# Patient Record
Sex: MALE | Race: WHITE | Employment: OTHER | URBAN - METROPOLITAN AREA
[De-identification: names, ages, dates, MRNs, and addresses within clinical notes are randomized per-mention and may not be internally consistent; named-entity substitution may affect disease eponyms.]

---

## 2017-04-10 ENCOUNTER — HOSPITAL ENCOUNTER (OUTPATIENT)
Dept: LAB | Age: 62
Discharge: HOME | End: 2017-04-10
Payer: COMMERCIAL

## 2017-04-10 LAB
ALBUMIN SERPL BCP-MCNC: 4 G/DL (ref 3.4–5)
ALBUMIN/GLOB SERPL: 1.1 {RATIO} (ref 0.8–1.7)
ALP SERPL-CCNC: 65 U/L (ref 45–117)
ALT SERPL-CCNC: 60 U/L (ref 16–61)
ANION GAP BLD CALC-SCNC: 7 MMOL/L (ref 3–18)
AST SERPL W P-5'-P-CCNC: 46 U/L (ref 15–37)
BASOPHILS # BLD AUTO: 0 K/UL (ref 0–0.1)
BASOPHILS # BLD: 0 % (ref 0–2)
BILIRUB DIRECT SERPL-MCNC: 0.3 MG/DL (ref 0–0.2)
BILIRUB SERPL-MCNC: 0.6 MG/DL (ref 0.2–1)
BUN SERPL-MCNC: 10 MG/DL (ref 7–18)
BUN/CREAT SERPL: 11 (ref 12–20)
CALCIUM SERPL-MCNC: 8.8 MG/DL (ref 8.5–10.1)
CHLORIDE SERPL-SCNC: 105 MMOL/L (ref 100–108)
CO2 SERPL-SCNC: 27 MMOL/L (ref 21–32)
CREAT SERPL-MCNC: 0.93 MG/DL (ref 0.6–1.3)
DIFFERENTIAL METHOD BLD: ABNORMAL
EOSINOPHIL # BLD: 0.1 K/UL (ref 0–0.4)
EOSINOPHIL NFR BLD: 1 % (ref 0–5)
ERYTHROCYTE [DISTWIDTH] IN BLOOD BY AUTOMATED COUNT: 12.8 % (ref 11.6–14.5)
GLOBULIN SER CALC-MCNC: 3.6 G/DL (ref 2–4)
GLUCOSE SERPL-MCNC: 90 MG/DL (ref 74–99)
HCT VFR BLD AUTO: 47.9 % (ref 36–48)
HGB BLD-MCNC: 16.3 G/DL (ref 13–16)
LYMPHOCYTES # BLD AUTO: 32 % (ref 21–52)
LYMPHOCYTES # BLD: 2.2 K/UL (ref 0.9–3.6)
MCH RBC QN AUTO: 33.3 PG (ref 24–34)
MCHC RBC AUTO-ENTMCNC: 34 G/DL (ref 31–37)
MCV RBC AUTO: 98 FL (ref 74–97)
MONOCYTES # BLD: 0.9 K/UL (ref 0.05–1.2)
MONOCYTES NFR BLD AUTO: 13 % (ref 3–10)
NEUTS SEG # BLD: 3.8 K/UL (ref 1.8–8)
NEUTS SEG NFR BLD AUTO: 54 % (ref 40–73)
PLATELET # BLD AUTO: 222 K/UL (ref 135–420)
PMV BLD AUTO: 11.2 FL (ref 9.2–11.8)
POTASSIUM SERPL-SCNC: 4.2 MMOL/L (ref 3.5–5.5)
PROT SERPL-MCNC: 7.6 G/DL (ref 6.4–8.2)
RBC # BLD AUTO: 4.89 M/UL (ref 4.7–5.5)
SODIUM SERPL-SCNC: 139 MMOL/L (ref 136–145)
WBC # BLD AUTO: 7 K/UL (ref 4.6–13.2)

## 2017-04-10 PROCEDURE — 36415 COLL VENOUS BLD VENIPUNCTURE: CPT | Performed by: PODIATRIST

## 2017-04-10 PROCEDURE — 80053 COMPREHEN METABOLIC PANEL: CPT | Performed by: PODIATRIST

## 2017-04-10 PROCEDURE — 82248 BILIRUBIN DIRECT: CPT | Performed by: PODIATRIST

## 2017-04-10 PROCEDURE — 85025 COMPLETE CBC W/AUTO DIFF WBC: CPT | Performed by: PODIATRIST

## 2017-05-19 ENCOUNTER — HOSPITAL ENCOUNTER (OUTPATIENT)
Dept: LAB | Age: 62
Discharge: HOME | End: 2017-05-19
Payer: COMMERCIAL

## 2017-05-19 LAB
ALBUMIN SERPL BCP-MCNC: 3.7 G/DL (ref 3.4–5)
ALBUMIN/GLOB SERPL: 0.9 {RATIO} (ref 0.8–1.7)
ALP SERPL-CCNC: 63 U/L (ref 45–117)
ALT SERPL-CCNC: 68 U/L (ref 16–61)
AST SERPL W P-5'-P-CCNC: 48 U/L (ref 15–37)
BILIRUB DIRECT SERPL-MCNC: 0.2 MG/DL (ref 0–0.2)
BILIRUB SERPL-MCNC: 0.6 MG/DL (ref 0.2–1)
GLOBULIN SER CALC-MCNC: 3.9 G/DL (ref 2–4)
PROT SERPL-MCNC: 7.6 G/DL (ref 6.4–8.2)

## 2017-05-19 PROCEDURE — 80076 HEPATIC FUNCTION PANEL: CPT | Performed by: PODIATRIST

## 2017-05-19 PROCEDURE — 36415 COLL VENOUS BLD VENIPUNCTURE: CPT | Performed by: PODIATRIST

## 2017-05-26 ENCOUNTER — HOSPITAL ENCOUNTER (OUTPATIENT)
Dept: LAB | Age: 62
Discharge: HOME | End: 2017-05-26
Payer: COMMERCIAL

## 2017-05-26 ENCOUNTER — OFFICE VISIT (OUTPATIENT)
Dept: FAMILY MEDICINE CLINIC | Age: 62
End: 2017-05-26

## 2017-05-26 VITALS
DIASTOLIC BLOOD PRESSURE: 82 MMHG | WEIGHT: 191 LBS | RESPIRATION RATE: 18 BRPM | HEIGHT: 70 IN | OXYGEN SATURATION: 98 % | SYSTOLIC BLOOD PRESSURE: 128 MMHG | HEART RATE: 97 BPM | TEMPERATURE: 97.9 F | BODY MASS INDEX: 27.35 KG/M2

## 2017-05-26 DIAGNOSIS — R79.89 ELEVATED LFTS: ICD-10-CM

## 2017-05-26 DIAGNOSIS — Z12.5 PROSTATE CANCER SCREENING: ICD-10-CM

## 2017-05-26 DIAGNOSIS — K63.5 POLYP OF COLON, UNSPECIFIED PART OF COLON, UNSPECIFIED TYPE: ICD-10-CM

## 2017-05-26 DIAGNOSIS — E78.5 DYSLIPIDEMIA: ICD-10-CM

## 2017-05-26 DIAGNOSIS — Z11.59 SCREENING FOR VIRAL DISEASE: ICD-10-CM

## 2017-05-26 DIAGNOSIS — Z00.00 ROUTINE MEDICAL EXAM: Primary | ICD-10-CM

## 2017-05-26 DIAGNOSIS — Z12.11 SCREENING FOR COLON CANCER: ICD-10-CM

## 2017-05-26 LAB
ALBUMIN SERPL BCP-MCNC: 3.8 G/DL (ref 3.4–5)
ALBUMIN/GLOB SERPL: 1 {RATIO} (ref 0.8–1.7)
ALP SERPL-CCNC: 62 U/L (ref 45–117)
ALT SERPL-CCNC: 68 U/L (ref 16–61)
AST SERPL W P-5'-P-CCNC: 47 U/L (ref 15–37)
BILIRUB DIRECT SERPL-MCNC: 0.2 MG/DL (ref 0–0.2)
BILIRUB SERPL-MCNC: 0.4 MG/DL (ref 0.2–1)
CHOLEST SERPL-MCNC: 124 MG/DL
GLOBULIN SER CALC-MCNC: 3.7 G/DL (ref 2–4)
HDLC SERPL-MCNC: 72 MG/DL (ref 40–60)
HDLC SERPL: 1.7 {RATIO} (ref 0–5)
HEMOCCULT STL QL: NEGATIVE
LDLC SERPL CALC-MCNC: 43.2 MG/DL (ref 0–100)
LIPID PROFILE,FLP: ABNORMAL
PROT SERPL-MCNC: 7.5 G/DL (ref 6.4–8.2)
TRIGL SERPL-MCNC: 44 MG/DL (ref ?–150)
VALID INTERNAL CONTROL?: YES
VLDLC SERPL CALC-MCNC: 8.8 MG/DL

## 2017-05-26 PROCEDURE — 80076 HEPATIC FUNCTION PANEL: CPT | Performed by: NURSE PRACTITIONER

## 2017-05-26 PROCEDURE — 36415 COLL VENOUS BLD VENIPUNCTURE: CPT | Performed by: NURSE PRACTITIONER

## 2017-05-26 PROCEDURE — 80061 LIPID PANEL: CPT | Performed by: NURSE PRACTITIONER

## 2017-05-26 PROCEDURE — 84153 ASSAY OF PSA TOTAL: CPT | Performed by: NURSE PRACTITIONER

## 2017-05-26 PROCEDURE — 86803 HEPATITIS C AB TEST: CPT | Performed by: NURSE PRACTITIONER

## 2017-05-26 NOTE — PROGRESS NOTES
Subjective:     Kole Nickerson is a 57 y.o. male presenting for annual exam and complete physical.  Treated by podiatry for nail fungus  Recent labs reveal AST 46 (reference range---15-37) will repeat today  Remaining CMP normal, CBC normal indices  See scanned copy  Patient Active Problem List   Diagnosis Code    GERD (gastroesophageal reflux disease) K21.9    Seasonal allergic rhinitis J30.2    Hemorrhoids, internal K64.8    Polyp of colon K63.5    Enlarged prostate N40.0    Transient ischemic attack G45.9    Dyslipidemia E78.5    MCKENNA (obstructive sleep apnea) G47.33     Patient Active Problem List    Diagnosis Date Noted    Transient ischemic attack 02/15/2012    Dyslipidemia 02/15/2012    MCKENNA (obstructive sleep apnea) 02/15/2012    Hemorrhoids, internal 08/17/2010    Polyp of colon 08/17/2010    Enlarged prostate 08/17/2010    GERD (gastroesophageal reflux disease) 06/25/2010    Seasonal allergic rhinitis 06/25/2010     Current Outpatient Prescriptions   Medication Sig Dispense Refill    clopidogrel (PLAVIX) 75 mg tablet TAKE 1 TABLET DAILY 90 Tab 2    simvastatin (ZOCOR) 40 mg tablet Take 1 Tab by mouth nightly. Appointment required before next fill. 90 Tab 0    famotidine (PEPCID) 20 mg tablet Take 1 Tab by mouth two (2) times a day. 60 Tab 3    Triamcinolone Acetonide (NASACORT AQ) 55 mcg/Actuation Aero nasal spray 2 Sprays by Both Nostrils route daily as needed (nasal congestion).  3 Container 3     Allergies   Allergen Reactions    Bactrim [Sulfamethoprim Ds] Rash    Nexium [Esomeprazole Magnesium] Other (comments)     Anxiety     Past Medical History:   Diagnosis Date    Dyslipidemia 2/15/2012    GERD (gastroesophageal reflux disease)     Hiatal hernia     IBS (irritable bowel syndrome)     mild     MCKENNA (obstructive sleep apnea) 2/15/2012    does not use cpap machine, oral device only    Seasonal allergies     Transient ischemic attack 2/15/2012     Past Surgical History: Procedure Laterality Date    HX GI  2000's    Fistulatomy    HX TONSILLECTOMY  30 years ago     No family history on file. Social History   Substance Use Topics    Smoking status: Former Smoker     Packs/day: 1.00     Years: 30.00     Quit date: 11/20/2012    Smokeless tobacco: Former User      Comment: quit smoking in 2012, e-ciggerate (vapor)    Alcohol use Yes      Comment: 12 pack per week             Review of Systems  A comprehensive review of systems was negative.     Objective:     Visit Vitals    /82 (BP 1 Location: Left arm, BP Patient Position: Sitting)    Pulse 97    Temp 97.9 °F (36.6 °C) (Oral)    Resp 18    Ht 5' 10\" (1.778 m)    Wt 191 lb (86.6 kg)    SpO2 98%    BMI 27.41 kg/m2     Physical exam:   General appearance - alert, well appearing, and in no distress, oriented to person, place, and time and normal appearing weight  Mental status - alert, oriented to person, place, and time, normal mood, behavior, speech, dress, motor activity, and thought processes  Eyes - pupils equal and reactive, extraocular eye movements intact  Ears - bilateral TM's and external ear canals normal, hearing grossly normal bilaterally  Nose - normal and patent, no erythema, discharge or polyps  Mouth - mucous membranes moist, pharynx normal without lesions, dental hygiene good and TMJ exam normal, no tenderness, normal excursion  Neck - supple, no significant adenopathy, carotids upstroke normal bilaterally, no bruits, thyroid exam: thyroid is normal in size without nodules or tenderness  Lymphatics - no palpable lymphadenopathy, no hepatosplenomegaly  Chest - clear to auscultation, no wheezes, rales or rhonchi, symmetric air entry  Heart - normal rate, regular rhythm, normal S1, S2, no murmurs, rubs, clicks or gallops, normal bilateral carotid upstroke without bruits, no JVD  Abdomen - soft, nontender, nondistended, no masses or organomegaly  bowel sounds normal   Male - no penile lesions or discharge, no testicular masses or tenderness, no hernias  Rectal - negative without mass, lesions or tenderness, sphincter tone normal, stool guaiac negative, PROSTATE EXAM: smooth and symmetric without nodules or tenderness  Back exam - full range of motion, no tenderness, palpable spasm or pain on motion  Neurological - alert, oriented, normal speech, no focal findings or movement disorder noted, neck supple without rigidity, cranial nerves II through XII intact, funduscopic exam normal, discs flat and sharp, DTR's normal and symmetric, Babinski sign negative, motor and sensory grossly normal bilaterally, normal muscle tone, no tremors, strength 5/5, Romberg sign negative, normal gait and station  Musculoskeletal - no joint tenderness, deformity or swelling  Extremities - peripheral pulses normal, no pedal edema, no clubbing or cyanosis  Skin - normal coloration and turgor, no rashes, no suspicious skin lesions noted  NAILS: onychomycosis of the toenails (managed by podiatry)   Results for orders placed or performed in visit on 05/26/17   AMB POC FECAL BLOOD, OCCULT, QL 1 CARD   Result Value Ref Range    VALID INTERNAL CONTROL POC Yes     Hemoccult (POC) Negative Negative       Assessment/Plan:     Neftali Danielle was seen today for well male. Diagnoses and all orders for this visit:    Routine medical exam    Screening for colon cancer  -     AMB POC FECAL BLOOD, OCCULT, QL 1 CARD  -     REFERRAL TO GASTROENTEROLOGY    Screening for viral disease  -     HEPATITIS C AB; Future    Dyslipidemia  -     LIPID PANEL; Future    Polyp of colon, unspecified part of colon, unspecified type  -     REFERRAL TO GASTROENTEROLOGY    Elevated LFTs  -     HEPATIC FUNCTION PANEL; Future    Prostate cancer screening  -     PSA - PROSTATE SPECIFIC AG; Future    patient declines all immunizations today  Anticipatory guidance regarding health promotion for this age range and patient verbalizes understanding.   Portion control and exercise healthy eating general guidelines reviewed with patient to get closer to normal BMI  Health maintenance reviewed  Patient verbalizes understanding. I have discussed the diagnosis with the patient and the intended plan as seen in the above orders. The patient has received an after-visit summary and questions were answered concerning future plans. I have discussed medication side effects and warnings with the patient as well. Follow-up Disposition:  Return in about 1 year (around 5/26/2018) for wellness.

## 2017-05-26 NOTE — PATIENT INSTRUCTIONS
Well Visit, Men 48 to 72: Care Instructions  Your Care Instructions  Physical exams can help you stay healthy. Your doctor has checked your overall health and may have suggested ways to take good care of yourself. He or she also may have recommended tests. At home, you can help prevent illness with healthy eating, regular exercise, and other steps. Follow-up care is a key part of your treatment and safety. Be sure to make and go to all appointments, and call your doctor if you are having problems. It's also a good idea to know your test results and keep a list of the medicines you take. How can you care for yourself at home? · Reach and stay at a healthy weight. This will lower your risk for many problems, such as obesity, diabetes, heart disease, and high blood pressure. · Get at least 30 minutes of exercise on most days of the week. Walking is a good choice. You also may want to do other activities, such as running, swimming, cycling, or playing tennis or team sports. · Do not smoke. Smoking can make health problems worse. If you need help quitting, talk to your doctor about stop-smoking programs and medicines. These can increase your chances of quitting for good. · Protect your skin from too much sun. When you're outdoors from 10 a.m. to 4 p.m., stay in the shade or cover up with clothing and a hat with a wide brim. Wear sunglasses that block UV rays. Even when it's cloudy, put broad-spectrum sunscreen (SPF 30 or higher) on any exposed skin. · See a dentist one or two times a year for checkups and to have your teeth cleaned. · Wear a seat belt in the car. · Limit alcohol to 2 drinks a day. Too much alcohol can cause health problems. Follow your doctor's advice about when to have certain tests. These tests can spot problems early. · Cholesterol.  Your doctor will tell you how often to have this done based on your overall health and other things that can increase your risk for heart attack and stroke. · Blood pressure. Have your blood pressure checked during a routine doctor visit. Your doctor will tell you how often to check your blood pressure based on your age, your blood pressure results, and other factors. · Prostate exam. Talk to your doctor about whether you should have a blood test (called a PSA test) for prostate cancer. Experts disagree on whether men should have this test. Some experts recommend that you discuss the benefits and risks of the test with your doctor. · Diabetes. Ask your doctor whether you should have tests for diabetes. · Vision. Some experts recommend that you have yearly exams for glaucoma and other age-related eye problems starting at age 48. · Hearing. Tell your doctor if you notice any change in your hearing. You can have tests to find out how well you hear. · Colon cancer. You should begin tests for colon cancer at age 48. You may have one of several tests. Your doctor will tell you how often to have tests based on your age and risk. Risks include whether you already had a precancerous polyp removed from your colon or whether your parent, brother, sister, or child has had colon cancer. · Heart attack and stroke risk. At least every 4 to 6 years, you should have your risk for heart attack and stroke assessed. Your doctor uses factors such as your age, blood pressure, cholesterol, and whether you smoke or have diabetes to show what your risk for a heart attack or stroke is over the next 10 years. · Abdominal aortic aneurysm. Ask your doctor whether you should have a test to check for an aneurysm. You may need a test if you ever smoked or if your parent, brother, sister, or child has had an aneurysm. When should you call for help? Watch closely for changes in your health, and be sure to contact your doctor if you have any problems or symptoms that concern you. Where can you learn more? Go to http://jose d-valorie.info/.   Enter F457 in the search box to learn more about \"Well Visit, Men 48 to 72: Care Instructions. \"  Current as of: 2016  Content Version: 11.2  © 4579-0263 [employer]. Care instructions adapted under license by [employer] (which disclaims liability or warranty for this information). If you have questions about a medical condition or this instruction, always ask your healthcare professional. [address] any warranty or liability for your use of this information. Tdap (Tetanus, Diphtheria, Pertussis) Vaccine: What You Need to Know  Why get vaccinated? Tetanus, diphtheria, and pertussis are very serious diseases. Tdap vaccine can protect us from these diseases. And Tdap vaccine given to pregnant women can protect  babies against pertussis. Tetanus (lockjaw) is rare in the [facility] today. It causes painful muscle tightening and stiffness, usually all over the body. · It can lead to tightening of muscles in the head and neck so you can't open your mouth, swallow, or sometimes even breathe. Tetanus kills about 1 out of 10 people who are infected even after receiving the best medical care. Diphtheria is also rare in the [place] today. It can cause a thick coating to form in the back of the throat. · It can lead to breathing problems, heart failure, paralysis, and death. Pertussis (whooping cough) causes severe coughing spells, which can cause difficulty breathing, vomiting, and disturbed sleep. · It can also lead to weight loss, incontinence, and rib fractures. Up to 2 in 100 adolescents and 5 in 100 adults with pertussis are hospitalized or have complications, which could include pneumonia or death. These diseases are caused by bacteria. Diphtheria and pertussis are spread from person to person through secretions from coughing or sneezing. Tetanus enters the body through cuts, scratches, or wounds.   Before vaccines, as many as 200,000 cases of diphtheria, 200,000 cases of pertussis, and hundreds of cases of tetanus were reported in the [place] each year. Since vaccination began, reports of cases for tetanus and diphtheria have dropped by about 99% and for pertussis by about 80%. Tdap vaccine  The Tdap vaccine can protect adolescents and adults from tetanus, diphtheria, and pertussis. One dose of Tdap is routinely given at age 6 or 15. People who did not get Tdap at that age should get it as soon as possible. Tdap is especially important for health care professionals and anyone having close contact with a baby younger than 12 months. Pregnant women should get a dose of Tdap during every pregnancy, to protect the  from pertussis. Infants are most at risk for severe, life-threatening complications from pertussis. Another vaccine, called Td, protects against tetanus and diphtheria, but not pertussis. A Td booster should be given every 10 years. Tdap may be given as one of these boosters if you have never gotten Tdap before. Tdap may also be given after a severe cut or burn to prevent tetanus infection. Your doctor or the person giving you the vaccine can give you more information. Tdap may safely be given at the same time as other vaccines. Some people should not get this vaccine  · A person who has ever had a life-threatening allergic reaction after a previous dose of any diphtheria-, tetanus-, or pertussis-containing vaccine, OR has a severe allergy to any part of this vaccine, should not get Tdap vaccine. Tell the person giving the vaccine about any severe allergies. · Anyone who had coma or long repeated seizures within 7 days after a childhood dose of DTP or DTaP, or a previous dose of Tdap, should not get Tdap, unless a cause other than the vaccine was found. They can still get Td. · Talk to your doctor if you:  ¨ Have seizures or another nervous system problem.   ¨ Had severe pain or swelling after any vaccine containing diphtheria, tetanus, or pertussis. ¨ Ever had a condition called Guillain-Barré Syndrome (GBS). ¨ Aren't feeling well on the day the shot is scheduled. Risks  With any medicine, including vaccines, there is a chance of side effects. These are usually mild and go away on their own. Serious reactions are also possible but are rare. Most people who get Tdap vaccine do not have any problems with it. Mild problems following Tdap  (Did not interfere with activities)  · Pain where the shot was given (about 3 in 4 adolescents or 2 in 3 adults)  · Redness or swelling where the shot was given (about 1 person in 5)  · Mild fever of at least 100.4°F (up to about 1 in 25 adolescents or 1 in 100 adults)  · Headache (about 3 or 4 people in 10)  · Tiredness (about 1 person in 3 or 4)  · Nausea, vomiting, diarrhea, stomachache (up to 1 in 4 adolescents or 1 in 10 adults)  · Chills, sore joints (about 1 person in 10)  · Body aches (about 1 person in 3 or 4)  · Rash, swollen glands (uncommon)  Moderate problems following Tdap  (Interfered with activities, but did not require medical attention)  · Pain where the shot was given (up to 1 in 5 or 6)  · Redness or swelling where the shot was given (up to about 1 in 16 adolescents or 1 in 12 adults)  · Fever over 102°F (about 1 in 100 adolescents or 1 in 250 adults)  · Headache (about 1 in 7 adolescents or 1 in 10 adults)  · Nausea, vomiting, diarrhea, stomachache (up to 1 to 3 people in 100)  · Swelling of the entire arm where the shot was given (up to about 1 in 500)  Severe problems following Tdap  (Unable to perform usual activities; required medical attention)  · Swelling, severe pain, bleeding and redness in the arm where the shot was given (rare)  Problems that could happen after any vaccine:  · People sometimes faint after a medical procedure, including vaccination. Sitting or lying down for about 15 minutes can help prevent fainting, and injuries caused by a fall.  Tell your doctor if you feel dizzy or have vision changes or ringing in the ears. · Some people get severe pain in the shoulder and have difficulty moving the arm where a shot was given. This happens very rarely. · Any medication can cause a severe allergic reaction. Such reactions from a vaccine are very rare, estimated at fewer than 1 in a million doses, and would happen within a few minutes to a few hours after the vaccination. As with any medicine, there is a very remote chance of a vaccine causing a serious injury or death. The safety of vaccines is always being monitored. For more information, visit: www.cdc.gov/vaccinesafety. What if there is a serious problem? What should I look for? · Look for anything that concerns you, such as signs of a severe allergic reaction, very high fever, or unusual behavior. Signs of a severe allergic reaction can include hives, swelling of the face and throat, difficulty breathing, a fast heartbeat, dizziness, and weakness. These would usually start a few minutes to a few hours after the vaccination. What should I do? · If you think it is a severe allergic reaction or other emergency that can't wait, call 9-1-1 or get the person to the nearest hospital. Otherwise, call your doctor. · Afterward, the reaction should be reported to the Vaccine Adverse Event Reporting System (VAERS). Your doctor might file this report, or you can do it yourself through the VAERS web site at www.vaers. hhs.gov, or by calling 1-624.656.4384. VAERS does not give medical advice. The National Vaccine Injury Compensation Program  The National Vaccine Injury Compensation Program (VICP) is a federal program that was created to compensate people who may have been injured by certain vaccines. Persons who believe they may have been injured by a vaccine can learn about the program and about filing a claim by calling 4-608.534.4901 or visiting the [employer] website at www.[facility].gov/vaccinecompensation.  There is a time limit to file a claim for compensation. How can I learn more? · Ask your doctor. He or she can give you the vaccine package insert or suggest other sources of information. · Call your local or state health department. · Contact the Centers for Disease Control and Prevention (CDC):  ¨ Call 0-875.658.3070 (1-800-CDC-INFO) or  ¨ Visit CDC's website at www.cdc.gov/vaccines  Vaccine Information Statement (Interim)  Tdap Vaccine  (2/24/15)  [address] [id number]  Department of Health and Human Services  Centers for Disease Control and Prevention  Many Vaccine Information Statements are available in Thai and other languages. See www.immunize.org/vis. Muchas hojas de información sobre vacunas están disponibles en español y en otros idiomas. Visite www.immunize.org/vis. Care instructions adapted under license by your healthcare professional. If you have questions about a medical condition or this instruction, always ask your healthcare professional. [place] [address] any warranty or liability for your use of this information. Shingles Vaccine: What You Need to Know  What is shingles? Shingles is a painful skin rash, often with blisters. It is also called herpes zoster, or just zoster. A shingles rash usually appears on one side of the face or body and lasts from 2 to 4 weeks. Its main symptom is pain, which can be quite severe. Other symptoms of shingles can include fever, headache, chills and upset stomach. Very rarely, a shingles infection can lead to pneumonia, hearing problems, blindness, brain inflammation (encephalitis) or death. For about 1 person in 5, severe pain can continue even long after the rash clears up. This is called post-herpetic neuralgia. Shingles is caused by the varicella zoster virus, the same virus that causes chickenpox. Only someone who has had chickenpoxor, rarely, has gotten chickenpox vaccinecan get shingles.  The virus stays in your body, and can cause shingles many years later.  You can't catch shingles from another person with shingles. However, a person who has never had chickenpox (or chickenpox vaccine) could get chickenpox from someone with shingles. This is not very common. Shingles is far more common in people 48years of age and older than in younger people. It is also more common in people whose immune systems are weakened because of a disease such as cancer, or drugs such as steroids or chemotherapy. At least 1 million people a year in the [facility] get shingles. Shingles vaccine  A vaccine for shingles was licensed in 3708. In clinical trials, the vaccine reduced the risk of shingles by 50%. It can also reduce pain in people who still get shingles after being vaccinated. A single dose of shingles vaccine is recommended for adults 61years of age and older. Some people should not get shingles vaccine or should wait  A person should not get shingles vaccine who:  · Has ever had a life-threatening allergic reaction to gelatin, the antibiotic neomycin, or any other component of shingles vaccine. Tell your doctor if you have any severe allergies. · Has a weakened immune system because of current:  ¨ AIDS or another disease that affects the immune system. ¨ Treatment with drugs that affect the immune system, such as prolonged use of high-dose steroids. ¨ Cancer treatment such as radiation or chemotherapy. ¨ Cancer affecting the bone marrow or lymphatic system, such as leukemia or lymphoma. · Is pregnant, or might be pregnant. Women should not become pregnant until at least 4 weeks after getting shingles vaccine. Someone with a minor acute illness, such as a cold, may be vaccinated. But anyone with a moderate or severe acute illness should usually wait until they recover before getting the vaccine. This includes anyone with a temperature of 101.3° F or higher. What are the risks from shingles vaccine?   A vaccine, like any medicine, could possibly cause serious problems, such as severe allergic reactions. However, the risk of a vaccine causing serious harm, or death, is extremely small. No serious problems have been identified with shingles vaccine. Mild problems  · Redness, soreness, swelling, or itching at the site of the injection (about 1 person in 3)  · Headache (about 1 person in 70)  Like all vaccines, shingles vaccine is being closely monitored for unusual or severe problems. What if there is a serious reaction? What should I look for? · Look for anything that concerns you, such as signs of a severe allergic reaction, very high fever, or behavior changes. Signs of a severe allergic reaction can include hives, swelling of the face and throat, difficulty breathing, a fast heartbeat, dizziness, and weakness. These would start a few minutes to a few hours after the vaccination. What should I do? · If you think it is a severe allergic reaction or other emergency that can't wait, call 9-1-1 or get the person to the nearest hospital. Otherwise, call your doctor. · Afterward, the reaction should be reported to the Vaccine Adverse Event Reporting System (VAERS). Your doctor might file this report, or you can do it yourself through the VAERS web site at www.vaers. hhs.gov, or by calling 0-981.336.6498. VAERS is only for reporting reactions. They do not give medical advice. How can I learn more? · Ask your doctor. · Call your local or state health department. · Contact the Centers for Disease Control and Prevention (CDC):  ¨ Call 6-705.561.7617 (1-800-CDC-INFO) or  ¨ Visit CDC's website at www.cdc.gov/vaccines  Vaccine Information Statement  Shingles Vaccine  (10/6/2009)  Department of Health and Human Services  Centers for Disease Control and Prevention  Many Vaccine Information Statements are available in [place] and other languages. See www.immunize.org/vis. Muchas hojas de información sobre vacunas están disponibles en español y en otros idiomas.  Visite www.immunize.org/vis. Care instructions adapted under license by your healthcare professional. If you have questions about a medical condition or this instruction, always ask your healthcare professional. [address] any warranty or liability for your use of this information. Body Mass Index: Care Instructions  Your Care Instructions    Body mass index (BMI) can help you see if your weight is raising your risk for health problems. It uses a formula to compare how much you weigh with how tall you are. · A BMI lower than 18.5 is considered underweight. · A BMI between 18.5 and 24.9 is considered healthy. · A BMI between 25 and 29.9 is considered overweight. A BMI of 30 or higher is considered obese. If your BMI is in the normal range, it means that you have a lower risk for weight-related health problems. If your BMI is in the overweight or obese range, you may be at increased risk for weight-related health problems, such as high blood pressure, heart disease, stroke, arthritis or joint pain, and diabetes. If your BMI is in the underweight range, you may be at increased risk for health problems such as fatigue, lower protection (immunity) against illness, muscle loss, bone loss, hair loss, and hormone problems. BMI is just one measure of your risk for weight-related health problems. You may be at higher risk for health problems if you are not active, you eat an unhealthy diet, or you drink too much alcohol or use tobacco products. Follow-up care is a key part of your treatment and safety. Be sure to make and go to all appointments, and call your doctor if you are having problems. It's also a good idea to know your test results and keep a list of the medicines you take. How can you care for yourself at home? · Practice healthy eating habits. This includes eating plenty of fruits, vegetables, whole grains, lean protein, and low-fat dairy.   · If your doctor recommends it, get more exercise. Walking is a good choice. Bit by bit, increase the amount you walk every day. Try for at least 30 minutes on most days of the week. · Do not smoke. Smoking can increase your risk for health problems. If you need help quitting, talk to your doctor about stop-smoking programs and medicines. These can increase your chances of quitting for good. · Limit alcohol to 2 drinks a day for men and 1 drink a day for women. Too much alcohol can cause health problems. If you have a BMI higher than 25  · Your doctor may do other tests to check your risk for weight-related health problems. This may include measuring the distance around your waist. A waist measurement of more than 40 inches in men or 35 inches in women can increase the risk of weight-related health problems. · Talk with your doctor about steps you can take to stay healthy or improve your health. You may need to make lifestyle changes to lose weight and stay healthy, such as changing your diet and getting regular exercise. If you have a BMI lower than 18.5  · Your doctor may do other tests to check your risk for health problems. · Talk with your doctor about steps you can take to stay healthy or improve your health. You may need to make lifestyle changes to gain or maintain weight and stay healthy, such as getting more healthy foods in your diet and doing exercises to build muscle. Where can you learn more? Go to http://jose d-valorie.info/. Enter S176 in the search box to learn more about \"Body Mass Index: Care Instructions. \"  Current as of: January 23, 2017  Content Version: 11.2  © 6481-8591 [employer]. Care instructions adapted under license by [employer] (which disclaims liability or warranty for this information).  If you have questions about a medical condition or this instruction, always ask your healthcare professional. [place] [address] any warranty or liability for your use of this information.

## 2017-05-26 NOTE — MR AVS SNAPSHOT
Visit Information Date & Time Provider Department Dept. Phone Encounter #  
 5/26/2017 12:00 PM Mk Hart NP [facility] [phone] [id number] Follow-up Instructions Return in about 1 year (around 5/26/2018) for wellness. Upcoming Health Maintenance Date Due FOBT Q 1 YEAR AGE 50-75 11/23/2004 ZOSTER VACCINE AGE 60> 11/23/2014 INFLUENZA AGE 9 TO ADULT 8/1/2017 DTaP/Tdap/Td series (2 - Td) 5/26/2027 Allergies as of 5/26/2017  Review Complete On: 5/26/2017 By: Mk Hart NP Severity Noted Reaction Type Reactions Bactrim [Sulfamethoprim Ds]  09/16/2013    Rash Nexium [Esomeprazole Magnesium]  06/25/2010    Other (comments) Anxiety Current Immunizations  Reviewed on 5/26/2017 Name Date Influenza Vaccine (Quad) PF 11/2/2016, 10/21/2015, 9/26/2014 Influenza Vaccine PF 9/16/2013, 12/4/2012 11:11 AM  
 Influenza Vaccine Split 9/29/2011 Reviewed by Mk Hart NP on 5/26/2017 at 12:15 PM  
You Were Diagnosed With   
  
 Codes Comments Routine medical exam    -  Primary ICD-10-CM: Z00.00 ICD-9-CM: V70.0 Screening for colon cancer     ICD-10-CM: Z12.11 ICD-9-CM: V76.51 Screening for viral disease     ICD-10-CM: Z11.59 
ICD-9-CM: V73.99 Dyslipidemia     ICD-10-CM: E78.5 ICD-9-CM: 272.4 Polyp of colon, unspecified part of colon, unspecified type     ICD-10-CM: K63.5 ICD-9-CM: 211.3 Elevated LFTs     ICD-10-CM: R94.5 ICD-9-CM: 790.6 Prostate cancer screening     ICD-10-CM: Z12.5 ICD-9-CM: V76.44 Vitals BP Pulse Temp Resp Height(growth percentile) Weight(growth percentile) 128/82 (BP 1 Location: Left arm, BP Patient Position: Sitting) 97 97.9 °F (36.6 °C) (Oral) 18 5' 10\" (1.778 m) 191 lb (86.6 kg) SpO2 BMI Smoking Status 98% 27.41 kg/m2 Former Smoker Vitals History BMI and BSA Data Body Mass Index Body Surface Area 27.41 kg/m 2 2.07 m 2 Preferred Pharmacy Pharmacy Name Phone [address] [facility] [phone] Your Updated Medication List  
  
   
This list is accurate as of: 5/26/17 12:36 PM.  Always use your most recent med list.  
  
  
  
  
 clopidogrel 75 mg Tab Commonly known as:  PLAVIX TAKE 1 TABLET DAILY  
  
 famotidine 20 mg tablet Commonly known as:  PEPCID Take 1 Tab by mouth two (2) times a day. simvastatin 40 mg tablet Commonly known as:  ZOCOR Take 1 Tab by mouth nightly. Appointment required before next fill. Triamcinolone Acetonide 55 mcg/actuation Aero nasal spray Commonly known as:  NASACORT AQ  
2 Sprays by Both Nostrils route daily as needed (nasal congestion). We Performed the Following AMB POC FECAL BLOOD, OCCULT, QL 1 CARD [29507 CPT(R)] REFERRAL TO GASTROENTEROLOGY [SNI80 Custom] Comments:  
 Please evaluate patient for colon polyps. Follow-up Instructions Return in about 1 year (around 5/26/2018) for wellness. To-Do List   
 05/26/2017 Lab:  HEPATIC FUNCTION PANEL   
  
 05/26/2017 Lab:  HEPATITIS C AB   
  
 05/26/2017 Lab:  LIPID PANEL   
  
 05/26/2017 Lab:  PROSTATE SPECIFIC AG (PSA)   
  
 11/22/2017 8:30 AM  
  Appointment with [facility]  RM 1 at [address] [place] [address] ([phone]) OUTSIDE FILMS  - Any outside films related to the study being scheduled should be brought with you on the day of the exam.  If this cannot be done there may be a delay in the reading of the study. MEDICATIONS  - Patient must bring a complete list of all medications currently taking to include prescriptions, over-the-counter meds, herbals, vitamins & any dietary supplements Referral Information Referral ID Referred By Referred To  
  
 [id number] [place] [address] [place], [address] Phone: [phone] Fax: [phone] Visits Status Start Date End Date 1 New Request 5/26/17 5/26/18 If your referral has a status of pending review or denied, additional information will be sent to support the outcome of this decision. Patient Instructions Well Visit, Men 48 to 72: Care Instructions Your Care Instructions Physical exams can help you stay healthy. Your doctor has checked your overall health and may have suggested ways to take good care of yourself. He or she also may have recommended tests. At home, you can help prevent illness with healthy eating, regular exercise, and other steps. Follow-up care is a key part of your treatment and safety. Be sure to make and go to all appointments, and call your doctor if you are having problems. It's also a good idea to know your test results and keep a list of the medicines you take. How can you care for yourself at home? · Reach and stay at a healthy weight. This will lower your risk for many problems, such as obesity, diabetes, heart disease, and high blood pressure. · Get at least 30 minutes of exercise on most days of the week. Walking is a good choice. You also may want to do other activities, such as running, swimming, cycling, or playing tennis or team sports. · Do not smoke. Smoking can make health problems worse. If you need help quitting, talk to your doctor about stop-smoking programs and medicines. These can increase your chances of quitting for good. · Protect your skin from too much sun. When you're outdoors from 10 a.m. to 4 p.m., stay in the shade or cover up with clothing and a hat with a wide brim. Wear sunglasses that block UV rays. Even when it's cloudy, put broad-spectrum sunscreen (SPF 30 or higher) on any exposed skin. · See a dentist one or two times a year for checkups and to have your teeth cleaned. · Wear a seat belt in the car. · Limit alcohol to 2 drinks a day. Too much alcohol can cause health problems. Follow your doctor's advice about when to have certain tests. These tests can spot problems early. · Cholesterol. Your doctor will tell you how often to have this done based on your overall health and other things that can increase your risk for heart attack and stroke. · Blood pressure. Have your blood pressure checked during a routine doctor visit. Your doctor will tell you how often to check your blood pressure based on your age, your blood pressure results, and other factors. · Prostate exam. Talk to your doctor about whether you should have a blood test (called a PSA test) for prostate cancer. Experts disagree on whether men should have this test. Some experts recommend that you discuss the benefits and risks of the test with your doctor. · Diabetes. Ask your doctor whether you should have tests for diabetes. · Vision. Some experts recommend that you have yearly exams for glaucoma and other age-related eye problems starting at age 48. · Hearing. Tell your doctor if you notice any change in your hearing. You can have tests to find out how well you hear. · Colon cancer. You should begin tests for colon cancer at age 48. You may have one of several tests. Your doctor will tell you how often to have tests based on your age and risk. Risks include whether you already had a precancerous polyp removed from your colon or whether your parent, brother, sister, or child has had colon cancer. · Heart attack and stroke risk. At least every 4 to 6 years, you should have your risk for heart attack and stroke assessed. Your doctor uses factors such as your age, blood pressure, cholesterol, and whether you smoke or have diabetes to show what your risk for a heart attack or stroke is over the next 10 years. · Abdominal aortic aneurysm.  Ask your doctor whether you should have a test to check for an aneurysm. You may need a test if you ever smoked or if your parent, brother, sister, or child has had an aneurysm. When should you call for help? Watch closely for changes in your health, and be sure to contact your doctor if you have any problems or symptoms that concern you. Where can you learn more? Go to http://jose d-valorie.info/. Enter V046 in the search box to learn more about \"Well Visit, Men 48 to 72: Care Instructions. \" Current as of: 2016 Content Version: 11.2 © 0358-9666 [employer]. Care instructions adapted under license by [employer] (which disclaims liability or warranty for this information). If you have questions about a medical condition or this instruction, always ask your healthcare professional. [address] any warranty or liability for your use of this information. Tdap (Tetanus, Diphtheria, Pertussis) Vaccine: What You Need to Know Why get vaccinated? Tetanus, diphtheria, and pertussis are very serious diseases. Tdap vaccine can protect us from these diseases. And Tdap vaccine given to pregnant women can protect  babies against pertussis. Tetanus (lockjaw) is rare in the [facility] today. It causes painful muscle tightening and stiffness, usually all over the body. · It can lead to tightening of muscles in the head and neck so you can't open your mouth, swallow, or sometimes even breathe. Tetanus kills about 1 out of 10 people who are infected even after receiving the best medical care. Diphtheria is also rare in the [place] today. It can cause a thick coating to form in the back of the throat. · It can lead to breathing problems, heart failure, paralysis, and death. Pertussis (whooping cough) causes severe coughing spells, which can cause difficulty breathing, vomiting, and disturbed sleep. · It can also lead to weight loss, incontinence, and rib fractures. Up to 2 in 100 adolescents and 5 in 100 adults with pertussis are hospitalized or have complications, which could include pneumonia or death. These diseases are caused by bacteria. Diphtheria and pertussis are spread from person to person through secretions from coughing or sneezing. Tetanus enters the body through cuts, scratches, or wounds. Before vaccines, as many as 200,000 cases of diphtheria, 200,000 cases of pertussis, and hundreds of cases of tetanus were reported in the [place] each year. Since vaccination began, reports of cases for tetanus and diphtheria have dropped by about 99% and for pertussis by about 80%. Tdap vaccine The Tdap vaccine can protect adolescents and adults from tetanus, diphtheria, and pertussis. One dose of Tdap is routinely given at age 6 or 15. People who did not get Tdap at that age should get it as soon as possible. Tdap is especially important for health care professionals and anyone having close contact with a baby younger than 12 months. Pregnant women should get a dose of Tdap during every pregnancy, to protect the  from pertussis. Infants are most at risk for severe, life-threatening complications from pertussis. Another vaccine, called Td, protects against tetanus and diphtheria, but not pertussis. A Td booster should be given every 10 years. Tdap may be given as one of these boosters if you have never gotten Tdap before. Tdap may also be given after a severe cut or burn to prevent tetanus infection. Your doctor or the person giving you the vaccine can give you more information. Tdap may safely be given at the same time as other vaccines. Some people should not get this vaccine · A person who has ever had a life-threatening allergic reaction after a previous dose of any diphtheria-, tetanus-, or pertussis-containing vaccine, OR has a severe allergy to any part of this vaccine, should not get Tdap vaccine. Tell the person giving the vaccine about any severe allergies. · Anyone who had coma or long repeated seizures within 7 days after a childhood dose of DTP or DTaP, or a previous dose of Tdap, should not get Tdap, unless a cause other than the vaccine was found. They can still get Td. · Talk to your doctor if you: 
¨ Have seizures or another nervous system problem. ¨ Had severe pain or swelling after any vaccine containing diphtheria, tetanus, or pertussis. ¨ Ever had a condition called Guillain-Barré Syndrome (GBS). ¨ Aren't feeling well on the day the shot is scheduled. Risks With any medicine, including vaccines, there is a chance of side effects. These are usually mild and go away on their own. Serious reactions are also possible but are rare. Most people who get Tdap vaccine do not have any problems with it. Mild problems following Tdap 
(Did not interfere with activities) · Pain where the shot was given (about 3 in 4 adolescents or 2 in 3 adults) · Redness or swelling where the shot was given (about 1 person in 5) · Mild fever of at least 100.4°F (up to about 1 in 25 adolescents or 1 in 100 adults) · Headache (about 3 or 4 people in 10) · Tiredness (about 1 person in 3 or 4) · Nausea, vomiting, diarrhea, stomachache (up to 1 in 4 adolescents or 1 in 10 adults) · Chills, sore joints (about 1 person in 10) · Body aches (about 1 person in 3 or 4) · Rash, swollen glands (uncommon) Moderate problems following Tdap (Interfered with activities, but did not require medical attention) · Pain where the shot was given (up to 1 in 5 or 6) · Redness or swelling where the shot was given (up to about 1 in 16 adolescents or 1 in 12 adults) · Fever over 102°F (about 1 in 100 adolescents or 1 in 250 adults) · Headache (about 1 in 7 adolescents or 1 in 10 adults) · Nausea, vomiting, diarrhea, stomachache (up to 1 to 3 people in 100) · Swelling of the entire arm where the shot was given (up to about 1 in 500) Severe problems following Tdap 
(Unable to perform usual activities; required medical attention) · Swelling, severe pain, bleeding and redness in the arm where the shot was given (rare) Problems that could happen after any vaccine: · People sometimes faint after a medical procedure, including vaccination. Sitting or lying down for about 15 minutes can help prevent fainting, and injuries caused by a fall. Tell your doctor if you feel dizzy or have vision changes or ringing in the ears. · Some people get severe pain in the shoulder and have difficulty moving the arm where a shot was given. This happens very rarely. · Any medication can cause a severe allergic reaction. Such reactions from a vaccine are very rare, estimated at fewer than 1 in a million doses, and would happen within a few minutes to a few hours after the vaccination. As with any medicine, there is a very remote chance of a vaccine causing a serious injury or death. The safety of vaccines is always being monitored. For more information, visit: www.cdc.gov/vaccinesafety. What if there is a serious problem? What should I look for? · Look for anything that concerns you, such as signs of a severe allergic reaction, very high fever, or unusual behavior. Signs of a severe allergic reaction can include hives, swelling of the face and throat, difficulty breathing, a fast heartbeat, dizziness, and weakness. These would usually start a few minutes to a few hours after the vaccination. What should I do? · If you think it is a severe allergic reaction or other emergency that can't wait, call 9-1-1 or get the person to the nearest hospital. Otherwise, call your doctor.  
· Afterward, the reaction should be reported to the Vaccine Adverse Event Reporting System (VAERS). Your doctor might file this report, or you can do it yourself through the VAERS web site at www.vaers. [facility].gov, or by calling 7-279.326.8400. VAERS does not give medical advice. The National Vaccine Injury Compensation Program 
The National Vaccine Injury Compensation Program (VICP) is a federal program that was created to compensate people who may have been injured by certain vaccines. Persons who believe they may have been injured by a vaccine can learn about the program and about filing a claim by calling 2-391.663.5534 or visiting the [employer] website at www.[facility].gov/vaccinecompensation. There is a time limit to file a claim for compensation. How can I learn more? · Ask your doctor. He or she can give you the vaccine package insert or suggest other sources of information. · Call your local or state health department. · Contact the Centers for Disease Control and Prevention (CDC): 
¨ Call 2-394.623.2306 (1-800-CDC-INFO) or ¨ Visit CDC's website at www.cdc.gov/vaccines Vaccine Information Statement (Interim) Tdap Vaccine 
(2/24/15) [id number] [facility] [employer] Centers for Disease Control and Prevention Many Vaccine Information Statements are available in [place] and other languages. See www.immunize.org/vis. Muchas hojas de información sobre vacunas están disponibles en español y en otros idiomas. Visite www.immunize.org/vis. Care instructions adapted under license by your healthcare professional. If you have questions about a medical condition or this instruction, always ask your healthcare professional. [address] any warranty or liability for your use of this information. Shingles Vaccine: What You Need to Know What is shingles? Shingles is a painful skin rash, often with blisters. It is also called herpes zoster, or just zoster.  
A shingles rash usually appears on one side of the face or body and lasts from 2 to 4 weeks. Its main symptom is pain, which can be quite severe. Other symptoms of shingles can include fever, headache, chills and upset stomach. Very rarely, a shingles infection can lead to pneumonia, hearing problems, blindness, brain inflammation (encephalitis) or death. For about 1 person in 5, severe pain can continue even long after the rash clears up. This is called post-herpetic neuralgia. Shingles is caused by the varicella zoster virus, the same virus that causes chickenpox. Only someone who has had chickenpoxor, rarely, has gotten chickenpox vaccinecan get shingles. The virus stays in your body, and can cause shingles many years later. You can't catch shingles from another person with shingles. However, a person who has never had chickenpox (or chickenpox vaccine) could get chickenpox from someone with shingles. This is not very common. Shingles is far more common in people 48years of age and older than in younger people. It is also more common in people whose immune systems are weakened because of a disease such as cancer, or drugs such as steroids or chemotherapy. At least 1 million people a year in the [facility] get shingles. Shingles vaccine A vaccine for shingles was licensed in 9703. In clinical trials, the vaccine reduced the risk of shingles by 50%. It can also reduce pain in people who still get shingles after being vaccinated. A single dose of shingles vaccine is recommended for adults 61years of age and older. Some people should not get shingles vaccine or should wait A person should not get shingles vaccine who: 
· Has ever had a life-threatening allergic reaction to gelatin, the antibiotic neomycin, or any other component of shingles vaccine. Tell your doctor if you have any severe allergies. · Has a weakened immune system because of current: 
¨ AIDS or another disease that affects the immune system. ¨ Treatment with drugs that affect the immune system, such as prolonged use of high-dose steroids. ¨ Cancer treatment such as radiation or chemotherapy. ¨ Cancer affecting the bone marrow or lymphatic system, such as leukemia or lymphoma. · Is pregnant, or might be pregnant. Women should not become pregnant until at least 4 weeks after getting shingles vaccine. Someone with a minor acute illness, such as a cold, may be vaccinated. But anyone with a moderate or severe acute illness should usually wait until they recover before getting the vaccine. This includes anyone with a temperature of 101.3° F or higher. What are the risks from shingles vaccine? A vaccine, like any medicine, could possibly cause serious problems, such as severe allergic reactions. However, the risk of a vaccine causing serious harm, or death, is extremely small. No serious problems have been identified with shingles vaccine. Mild problems · Redness, soreness, swelling, or itching at the site of the injection (about 1 person in 3) · Headache (about 1 person in 79) Like all vaccines, shingles vaccine is being closely monitored for unusual or severe problems. What if there is a serious reaction? What should I look for? · Look for anything that concerns you, such as signs of a severe allergic reaction, very high fever, or behavior changes. Signs of a severe allergic reaction can include hives, swelling of the face and throat, difficulty breathing, a fast heartbeat, dizziness, and weakness. These would start a few minutes to a few hours after the vaccination. What should I do? · If you think it is a severe allergic reaction or other emergency that can't wait, call 9-1-1 or get the person to the nearest hospital. Otherwise, call your doctor. · Afterward, the reaction should be reported to the Vaccine Adverse Event Reporting System (VAERS).  Your doctor might file this report, or you can do it yourself through the VAERS web site at www.vaers. [place].gov, or by calling 1-365.767.9137. VAERS is only for reporting reactions. They do not give medical advice. How can I learn more? · Ask your doctor. · Call your local or state health department. · Contact the Centers for Disease Control and Prevention (CDC): 
¨ Call 0-697.678.1874 (1-800-CDC-INFO) or ¨ Visit CDC's website at www.cdc.gov/vaccines Vaccine Information Statement Shingles Vaccine 
(10/6/2009) Department of Health [facility] [employer] Centers for Disease Control and Prevention Many Vaccine Information Statements are available in Mauritian and other languages. See www.immunize.org/vis. Muchas hojas de información sobre vacunas están disponibles en español y en otros idiomas. Visite www.immunize.org/vis. Care instructions adapted under license by your healthcare professional. If you have questions about a medical condition or this instruction, always ask your healthcare professional. [address] any warranty or liability for your use of this information. Body Mass Index: Care Instructions Your Care Instructions Body mass index (BMI) can help you see if your weight is raising your risk for health problems. It uses a formula to compare how much you weigh with how tall you are. · A BMI lower than 18.5 is considered underweight. · A BMI between 18.5 and 24.9 is considered healthy. · A BMI between 25 and 29.9 is considered overweight. A BMI of 30 or higher is considered obese. If your BMI is in the normal range, it means that you have a lower risk for weight-related health problems. If your BMI is in the overweight or obese range, you may be at increased risk for weight-related health problems, such as high blood pressure, heart disease, stroke, arthritis or joint pain, and diabetes.  If your BMI is in the underweight range, you may be at increased risk for health problems such as fatigue, lower protection (immunity) against illness, muscle loss, bone loss, hair loss, and hormone problems. BMI is just one measure of your risk for weight-related health problems. You may be at higher risk for health problems if you are not active, you eat an unhealthy diet, or you drink too much alcohol or use tobacco products. Follow-up care is a key part of your treatment and safety. Be sure to make and go to all appointments, and call your doctor if you are having problems. It's also a good idea to know your test results and keep a list of the medicines you take. How can you care for yourself at home? · Practice healthy eating habits. This includes eating plenty of fruits, vegetables, whole grains, lean protein, and low-fat dairy. · If your doctor recommends it, get more exercise. Walking is a good choice. Bit by bit, increase the amount you walk every day. Try for at least 30 minutes on most days of the week. · Do not smoke. Smoking can increase your risk for health problems. If you need help quitting, talk to your doctor about stop-smoking programs and medicines. These can increase your chances of quitting for good. · Limit alcohol to 2 drinks a day for men and 1 drink a day for women. Too much alcohol can cause health problems. If you have a BMI higher than 25 · Your doctor may do other tests to check your risk for weight-related health problems. This may include measuring the distance around your waist. A waist measurement of more than 40 inches in men or 35 inches in women can increase the risk of weight-related health problems. · Talk with your doctor about steps you can take to stay healthy or improve your health. You may need to make lifestyle changes to lose weight and stay healthy, such as changing your diet and getting regular exercise. If you have a BMI lower than 18.5 · Your doctor may do other tests to check your risk for health problems.  
· Talk with your doctor about steps you can take to stay healthy or improve your health. You may need to make lifestyle changes to gain or maintain weight and stay healthy, such as getting more healthy foods in your diet and doing exercises to build muscle. Where can you learn more? Go to http://jose d-valorie.info/. Enter S176 in the search box to learn more about \"Body Mass Index: Care Instructions. \" Current as of: January 23, 2017 Content Version: 11.2 © 9195-9334 [employer]. Care instructions adapted under license by [employer] (which disclaims liability or warranty for this information). If you have questions about a medical condition or this instruction, always ask your healthcare professional. [address] any warranty or liability for your use of this information. Introducing [facility]! Dear Niesha Maier: Thank you for requesting a [employer] account. Our records indicate that you already have an active [employer] account. You can access your account anytime at [email or web address] Did you know that you can access your hospital and ER discharge instructions at any time in [employer]? You can also review all of your test results from your hospital stay or ER visit. Additional Information If you have questions, please visit the Frequently Asked Questions section of the [employer] website at [email or web address] Remember, [employer] is NOT to be used for urgent needs. For medical emergencies, dial 911. Now available from your iPhone and Android! Please provide this summary of care documentation to your next provider. Your primary care clinician is listed as [address]. If you have any questions after today's visit, please call [phone].

## 2017-05-27 LAB — PSA SERPL-MCNC: 0.7 NG/ML (ref 0–4)

## 2017-05-30 LAB
HCV AB SER IA-ACNC: >11 INDEX
HCV AB SERPL QL IA: POSITIVE
HCV COMMENT,HCGAC: ABNORMAL

## 2017-05-31 DIAGNOSIS — R76.8 HEPATITIS C ANTIBODY POSITIVE IN BLOOD: Primary | ICD-10-CM

## 2017-05-31 NOTE — PROGRESS NOTES
Please call patient and advise that hepatitis C is positive and will need to be evaluated further by gastroenterology.   PSA normal findings Referral has been ordered  [address]

## 2017-06-01 ENCOUNTER — TELEPHONE (OUTPATIENT)
Dept: FAMILY MEDICINE CLINIC | Age: 62
End: 2017-06-01

## 2017-06-01 NOTE — TELEPHONE ENCOUNTER
Spoke to pt adv that his Hep C screening was positive. We have issued a referral to Dr. Zhou Trejo. They should be calling you to set an appt. Also adv that PSA was normal. Pt verbalized understanding.

## 2017-06-01 NOTE — TELEPHONE ENCOUNTER
----- Message from Robbie Anderson NP sent at 5/31/2017  6:57 AM EDT -----  Please call patient and advise that hepatitis C is positive and will need to be evaluated further by gastroenterology.   PSA normal findings Referral has been ordered  [address] [facility]

## 2017-08-24 ENCOUNTER — HOSPITAL ENCOUNTER (OUTPATIENT)
Dept: ULTRASOUND IMAGING | Age: 62
Discharge: HOME | End: 2017-08-24
Attending: NURSE PRACTITIONER
Payer: COMMERCIAL

## 2017-08-24 DIAGNOSIS — B18.2 CHRONIC HEPATITIS C (HCC): ICD-10-CM

## 2017-08-24 PROCEDURE — 76705 ECHO EXAM OF ABDOMEN: CPT

## 2017-10-13 ENCOUNTER — CLINICAL SUPPORT (OUTPATIENT)
Dept: FAMILY MEDICINE CLINIC | Age: 62
End: 2017-10-13

## 2017-10-13 VITALS
BODY MASS INDEX: 26.83 KG/M2 | HEART RATE: 100 BPM | HEIGHT: 70 IN | SYSTOLIC BLOOD PRESSURE: 118 MMHG | RESPIRATION RATE: 18 BRPM | DIASTOLIC BLOOD PRESSURE: 70 MMHG | WEIGHT: 187.4 LBS | OXYGEN SATURATION: 95 % | TEMPERATURE: 98.1 F

## 2017-10-13 DIAGNOSIS — Z23 ENCOUNTER FOR IMMUNIZATION: ICD-10-CM

## 2017-10-13 DIAGNOSIS — Z23 ENCOUNTER FOR IMMUNIZATION: Primary | ICD-10-CM

## 2017-10-13 NOTE — PATIENT INSTRUCTIONS
Vaccine Information Statement    Influenza (Flu) Vaccine (Inactivated or Recombinant): What you need to know    Many Vaccine Information Statements are available in Thai and other languages. See www.immunize.org/vis  Hojas de Información Sobre Vacunas están disponibles en Español y en muchos otros idiomas. Visite www.immunize.org/vis    1. Why get vaccinated? Influenza (flu) is a contagious disease that spreads around the [place] every year, usually between October and May. Flu is caused by influenza viruses, and is spread mainly by coughing, sneezing, and close contact. Anyone can get flu. Flu strikes suddenly and can last several days. Symptoms vary by age, but can include:   fever/chills   sore throat   muscle aches   fatigue   cough   headache    runny or stuffy nose    Flu can also lead to pneumonia and blood infections, and cause diarrhea and seizures in children. If you have a medical condition, such as heart or lung disease, flu can make it worse. Flu is more dangerous for some people. Infants and young children, people 72years of age and older, pregnant women, and people with certain health conditions or a weakened immune system are at greatest risk. Each year thousands of people in the [facility] die from flu, and many more are hospitalized. Flu vaccine can:   keep you from getting flu,   make flu less severe if you do get it, and   keep you from spreading flu to your family and other people. 2. Inactivated and recombinant flu vaccines    A dose of flu vaccine is recommended every flu season. Children 6 months through 6years of age may need two doses during the same flu season. Everyone else needs only one dose each flu season.        Some inactivated flu vaccines contain a very small amount of a mercury-based preservative called thimerosal. Studies have not shown thimerosal in vaccines to be harmful, but flu vaccines that do not contain thimerosal are available. There is no live flu virus in flu shots. They cannot cause the flu. There are many flu viruses, and they are always changing. Each year a new flu vaccine is made to protect against three or four viruses that are likely to cause disease in the upcoming flu season. But even when the vaccine doesnt exactly match these viruses, it may still provide some protection    Flu vaccine cannot prevent:   flu that is caused by a virus not covered by the vaccine, or   illnesses that look like flu but are not. It takes about 2 weeks for protection to develop after vaccination, and protection lasts through the flu season. 3. Some people should not get this vaccine    Tell the person who is giving you the vaccine:     If you have any severe, life-threatening allergies. If you ever had a life-threatening allergic reaction after a dose of flu vaccine, or have a severe allergy to any part of this vaccine, you may be advised not to get vaccinated. Most, but not all, types of flu vaccine contain a small amount of egg protein.  If you ever had Guillain-Barré Syndrome (also called GBS). Some people with a history of GBS should not get this vaccine. This should be discussed with your doctor.  If you are not feeling well. It is usually okay to get flu vaccine when you have a mild illness, but you might be asked to come back when you feel better. 4. Risks of a vaccine reaction    With any medicine, including vaccines, there is a chance of reactions. These are usually mild and go away on their own, but serious reactions are also possible. Most people who get a flu shot do not have any problems with it.      Minor problems following a flu shot include:    soreness, redness, or swelling where the shot was given     hoarseness   sore, red or itchy eyes   cough   fever   aches   headache   itching   fatigue  If these problems occur, they usually begin soon after the shot and last 1 or 2 days. More serious problems following a flu shot can include the following:     There may be a small increased risk of Guillain-Barré Syndrome (GBS) after inactivated flu vaccine. This risk has been estimated at 1 or 2 additional cases per million people vaccinated. This is much lower than the risk of severe complications from flu, which can be prevented by flu vaccine.  Young children who get the flu shot along with pneumococcal vaccine (PCV13) and/or DTaP vaccine at the same time might be slightly more likely to have a seizure caused by fever. Ask your doctor for more information. Tell your doctor if a child who is getting flu vaccine has ever had a seizure. Problems that could happen after any injected vaccine:      People sometimes faint after a medical procedure, including vaccination. Sitting or lying down for about 15 minutes can help prevent fainting, and injuries caused by a fall. Tell your doctor if you feel dizzy, or have vision changes or ringing in the ears.  Some people get severe pain in the shoulder and have difficulty moving the arm where a shot was given. This happens very rarely.  Any medication can cause a severe allergic reaction. Such reactions from a vaccine are very rare, estimated at about 1 in a million doses, and would happen within a few minutes to a few hours after the vaccination. As with any medicine, there is a very remote chance of a vaccine causing a serious injury or death. The safety of vaccines is always being monitored. For more information, visit: www.cdc.gov/vaccinesafety/    5. What if there is a serious reaction? What should I look for?  Look for anything that concerns you, such as signs of a severe allergic reaction, very high fever, or unusual behavior.     Signs of a severe allergic reaction can include hives, swelling of the face and throat, difficulty breathing, a fast heartbeat, dizziness, and weakness - usually within a few minutes to a few hours after the vaccination. What should I do?  If you think it is a severe allergic reaction or other emergency that cant wait, call 9-1-1 and get the person to the nearest hospital. Otherwise, call your doctor.  Reactions should be reported to the Vaccine Adverse Event Reporting System (VAERS). Your doctor should file this report, or you can do it yourself through  the VAERS web site at www.vaers. [facility].gov, or by calling 1-352.886.5703. VAERS does not give medical advice. 6. The National Vaccine Injury Compensation Program    The [facility] Vaccine Injury Compensation Program (VICP) is a federal program that was created to compensate people who may have been injured by certain vaccines. Persons who believe they may have been injured by a vaccine can learn about the program and about filing a claim by calling 7-277.226.5137 or visiting the [employer] website at www.[facility].gov/vaccinecompensation. There is a time limit to file a claim for compensation. 7. How can I learn more?  Ask your healthcare provider. He or she can give you the vaccine package insert or suggest other sources of information.  Call your local or state health department.  Contact the Centers for Disease Control and Prevention (CDC):  - Call 5-765.989.1250 (1-800-CDC-INFO) or  - Visit CDCs website at www.cdc.gov/flu    Vaccine Information Statement   Inactivated Influenza Vaccine   8/7/2015  [id number]    Department of Health and Human Services  Centers for Disease Control and Prevention    Office Use Only

## 2017-10-13 NOTE — PROGRESS NOTES
Aileen Pena is a 57 y.o. male who presents for routine immunizations. He denies any symptoms , reactions or allergies that would exclude them from being immunized today. Risks and adverse reactions were discussed and the VIS was given to them. All questions were addressed. He was observed for 20 min post injection. There were no reactions observed.     Breana Faust LPN

## 2017-10-13 NOTE — MR AVS SNAPSHOT
Visit Information Date & Time Provider Department Dept. Phone Encounter #  
 10/13/2017  2:20 PM Denia Burdick NP [employer] [address] [id number] Upcoming Health Maintenance Date Due Pneumococcal 19-64 Medium Risk (1 of 1 - PPSV23) 11/23/1973 FOBT Q 1 YEAR AGE 50-75 11/23/2004 INFLUENZA AGE 9 TO ADULT 8/1/2017 DTaP/Tdap/Td series (2 - Td) 5/26/2027 Allergies as of 10/13/2017  Review Complete On: 10/13/2017 By: Ollie Snow LPN Severity Noted Reaction Type Reactions Bactrim [Sulfamethoprim Ds]  09/16/2013    Rash Nexium [Esomeprazole Magnesium]  06/25/2010    Other (comments) Anxiety Current Immunizations  Reviewed on 5/26/2017 Name Date Influenza Vaccine (Quad) PF  Incomplete, 11/2/2016, 10/21/2015, 9/26/2014 Influenza Vaccine PF 9/16/2013, 12/4/2012 11:11 AM  
 Influenza Vaccine Split 9/29/2011 Not reviewed this visit You Were Diagnosed With   
  
 Codes Comments Encounter for immunization    -  Primary ICD-10-CM: W30 ICD-9-CM: V03.89 Encounter for immunization     ICD-10-CM: G25 ICD-9-CM: V03.89 flu quad given Vitals BP Pulse Temp Resp Height(growth percentile) Weight(growth percentile) 118/70 100 98.1 °F (36.7 °C) 18 5' 10\" (1.778 m) 187 lb 6.4 oz (85 kg) SpO2 BMI Smoking Status 95% 26.89 kg/m2 Former Smoker BMI and BSA Data Body Mass Index Body Surface Area  
 26.89 kg/m 2 2.05 m 2 Preferred Pharmacy Pharmacy Name Phone [address] [facility] [phone] Your Updated Medication List  
  
   
This list is accurate as of: 10/13/17  2:48 PM.  Always use your most recent med list.  
  
  
  
  
 clopidogrel 75 mg Tab Commonly known as:  PLAVIX TAKE 1 TABLET DAILY  
  
 famotidine 20 mg tablet Commonly known as:  PEPCID Take 1 Tab by mouth two (2) times a day. simvastatin 40 mg tablet Commonly known as:  ZOCOR Take 1 Tab by mouth nightly. Appointment required before next fill. Triamcinolone Acetonide 55 mcg/actuation Aero nasal spray Commonly known as:  NASACORT AQ  
2 Sprays by Both Nostrils route daily as needed (nasal congestion). We Performed the Following INFLUENZA VIRUS VAC QUAD,SPLIT,PRESV FREE SYRINGE IM [id number] CPT(R)] To-Do List   
 11/22/2017 8:30 AM  
  Appointment with [facility] 1 at [address] [place] [address] ([phone]) OUTSIDE FILMS  - Any outside films related to the study being scheduled should be brought with you on the day of the exam.  If this cannot be done there may be a delay in the reading of the study. MEDICATIONS  - Patient must bring a complete list of all medications currently taking to include prescriptions, over-the-counter meds, herbals, vitamins & any dietary supplements Patient Instructions Vaccine Information Statement Influenza (Flu) Vaccine (Inactivated or Recombinant): What you need to know Many Vaccine Information Statements are available in Portuguese and other languages. See www.immunize.org/vis Hojas de Información Sobre Vacunas están disponibles en Español y en muchos otros idiomas. Visite www.immunize.org/vis 1. Why get vaccinated? Influenza (flu) is a contagious disease that spreads around the [place] every year, usually between October and May. Flu is caused by influenza viruses, and is spread mainly by coughing, sneezing, and close contact. Anyone can get flu. Flu strikes suddenly and can last several days. Symptoms vary by age, but can include: 
 fever/chills  sore throat  muscle aches  fatigue  cough  headache  runny or stuffy nose Flu can also lead to pneumonia and blood infections, and cause diarrhea and seizures in children.   If you have a medical condition, such as heart or lung disease, flu can make it worse. Flu is more dangerous for some people. Infants and young children, people 72years of age and older, pregnant women, and people with certain health conditions or a weakened immune system are at greatest risk. Each year thousands of people in the [facility] die from flu, and many more are hospitalized. Flu vaccine can: 
 keep you from getting flu, 
 make flu less severe if you do get it, and 
 keep you from spreading flu to your family and other people. 2. Inactivated and recombinant flu vaccines A dose of flu vaccine is recommended every flu season. Children 6 months through 6years of age may need two doses during the same flu season. Everyone else needs only one dose each flu season. Some inactivated flu vaccines contain a very small amount of a mercury-based preservative called thimerosal. Studies have not shown thimerosal in vaccines to be harmful, but flu vaccines that do not contain thimerosal are available. There is no live flu virus in flu shots. They cannot cause the flu. There are many flu viruses, and they are always changing. Each year a new flu vaccine is made to protect against three or four viruses that are likely to cause disease in the upcoming flu season. But even when the vaccine doesnt exactly match these viruses, it may still provide some protection Flu vaccine cannot prevent: 
 flu that is caused by a virus not covered by the vaccine, or 
 illnesses that look like flu but are not. It takes about 2 weeks for protection to develop after vaccination, and protection lasts through the flu season. 3. Some people should not get this vaccine Tell the person who is giving you the vaccine:  If you have any severe, life-threatening allergies.    
If you ever had a life-threatening allergic reaction after a dose of flu vaccine, or have a severe allergy to any part of this vaccine, you may be advised not to get vaccinated. Most, but not all, types of flu vaccine contain a small amount of egg protein.  If you ever had Guillain-Barré Syndrome (also called GBS). Some people with a history of GBS should not get this vaccine. This should be discussed with your doctor.  If you are not feeling well. It is usually okay to get flu vaccine when you have a mild illness, but you might be asked to come back when you feel better. 4. Risks of a vaccine reaction With any medicine, including vaccines, there is a chance of reactions. These are usually mild and go away on their own, but serious reactions are also possible. Most people who get a flu shot do not have any problems with it. Minor problems following a flu shot include:  
 soreness, redness, or swelling where the shot was given  hoarseness  sore, red or itchy eyes  cough  fever  aches  headache  itching  fatigue If these problems occur, they usually begin soon after the shot and last 1 or 2 days. More serious problems following a flu shot can include the following:  There may be a small increased risk of Guillain-Barré Syndrome (GBS) after inactivated flu vaccine. This risk has been estimated at 1 or 2 additional cases per million people vaccinated. This is much lower than the risk of severe complications from flu, which can be prevented by flu vaccine.  Young children who get the flu shot along with pneumococcal vaccine (PCV13) and/or DTaP vaccine at the same time might be slightly more likely to have a seizure caused by fever. Ask your doctor for more information. Tell your doctor if a child who is getting flu vaccine has ever had a seizure. Problems that could happen after any injected vaccine:  People sometimes faint after a medical procedure, including vaccination.  Sitting or lying down for about 15 minutes can help prevent fainting, and injuries caused by a fall. Tell your doctor if you feel dizzy, or have vision changes or ringing in the ears.  Some people get severe pain in the shoulder and have difficulty moving the arm where a shot was given. This happens very rarely.  Any medication can cause a severe allergic reaction. Such reactions from a vaccine are very rare, estimated at about 1 in a million doses, and would happen within a few minutes to a few hours after the vaccination. As with any medicine, there is a very remote chance of a vaccine causing a serious injury or death. The safety of vaccines is always being monitored. For more information, visit: www.cdc.gov/vaccinesafety/ 
 
 
The [facility] Vaccine Injury Compensation Program (VICP) is a federal program that was created to compensate people who may have been injured by certain vaccines.  
 
Persons who believe they may have been injured by a vaccine can learn about the program and about filing a claim by calling 9-640.722.1381 or visiting the [address] website at www.[facility] [[email or web address]]a.gov/vaccinecompensation. There is a time limit to file a claim for compensation. 7. How can I learn more?  Ask your healthcare provider. He or she can give you the vaccine package insert or suggest other sources of information.  Call your local or state health department.  Contact the Centers for Disease Control and Prevention (CDC): 
- Call 7-864.999.4047 (1-800-CDC-INFO) or 
- Visit CDCs website at www.cdc.gov/flu Vaccine Information Statement Inactivated Influenza Vaccine 8/7/2015 
[address] [id number]-[id number] Department of [facility] and [employer] Centers for Disease Control and Prevention Office Use Only Introducing [address]! Dear José Acevedo: Thank you for requesting a [employer] account. Our records indicate that you already have an active [employer] account. You can access your account anytime at [email or web address] Did you know that you can access your hospital and ER discharge instructions at any time in [employer]? You can also review all of your test results from your hospital stay or ER visit. Additional Information If you have questions, please visit the Frequently Asked Questions section of the [employer] website at [email or web address] Remember, [employer] [facility] [employer] is NOT to be used for urgent needs. For medical emergencies, dial 911. Now available from your iPhone and Android! Please provide this summary of care documentation to your next provider. Your primary care clinician is listed as [address]. If you have any questions after today's visit, please call [phone].

## 2017-11-22 ENCOUNTER — HOSPITAL ENCOUNTER (OUTPATIENT)
Dept: ULTRASOUND IMAGING | Age: 62
Discharge: HOME | End: 2017-11-22
Attending: OTOLARYNGOLOGY
Payer: COMMERCIAL

## 2017-11-22 DIAGNOSIS — R22.0 LOCALIZED SWELLING, MASS, AND LUMP OF HEAD: ICD-10-CM

## 2017-11-22 PROCEDURE — 76536 US EXAM OF HEAD AND NECK: CPT

## 2017-12-05 ENCOUNTER — OFFICE VISIT (OUTPATIENT)
Dept: FAMILY MEDICINE CLINIC | Age: 62
End: 2017-12-05

## 2017-12-05 VITALS
TEMPERATURE: 98.6 F | DIASTOLIC BLOOD PRESSURE: 78 MMHG | RESPIRATION RATE: 18 BRPM | HEIGHT: 70 IN | WEIGHT: 191.8 LBS | SYSTOLIC BLOOD PRESSURE: 115 MMHG | OXYGEN SATURATION: 99 % | BODY MASS INDEX: 27.46 KG/M2 | HEART RATE: 88 BPM

## 2017-12-05 DIAGNOSIS — R73.9 ELEVATED BLOOD SUGAR: ICD-10-CM

## 2017-12-05 DIAGNOSIS — Z86.73 HISTORY OF TIA (TRANSIENT ISCHEMIC ATTACK): ICD-10-CM

## 2017-12-05 DIAGNOSIS — E78.5 DYSLIPIDEMIA: Primary | ICD-10-CM

## 2017-12-05 DIAGNOSIS — J20.9 ACUTE BRONCHITIS, UNSPECIFIED ORGANISM: ICD-10-CM

## 2017-12-05 LAB — HBA1C MFR BLD HPLC: 5.4 %

## 2017-12-05 RX ORDER — CLOPIDOGREL BISULFATE 75 MG/1
75 TABLET ORAL DAILY
Qty: 90 TAB | Refills: 2 | Status: SHIPPED | OUTPATIENT
Start: 2017-12-05 | End: 2018-09-03 | Stop reason: SDUPTHER

## 2017-12-05 RX ORDER — AZITHROMYCIN 250 MG/1
TABLET, FILM COATED ORAL
Qty: 6 TAB | Refills: 0 | Status: SHIPPED | OUTPATIENT
Start: 2017-12-05 | End: 2017-12-10

## 2017-12-05 RX ORDER — SIMVASTATIN 40 MG/1
40 TABLET, FILM COATED ORAL
Qty: 90 TAB | Refills: 2 | Status: SHIPPED | OUTPATIENT
Start: 2017-12-05 | End: 2018-09-03 | Stop reason: SDUPTHER

## 2017-12-05 RX ORDER — AZITHROMYCIN 250 MG/1
TABLET, FILM COATED ORAL
Qty: 6 TAB | Refills: 0 | Status: SHIPPED | OUTPATIENT
Start: 2017-12-05 | End: 2017-12-05

## 2017-12-05 NOTE — MR AVS SNAPSHOT
Visit Information Date & Time Provider Department Dept. Phone Encounter #  
 12/5/2017 12:00 PM Elisha Macario, [address] [facility] [phone] [id number] Follow-up Instructions Return in about 4 months (around 4/5/2018), or if symptoms worsen or fail to improve, for lipid TIA. Upcoming Health Maintenance Date Due Pneumococcal 19-64 Medium Risk (1 of 1 - PPSV23) 11/23/1973 FOBT Q 1 YEAR AGE 50-75 11/23/2004 DTaP/Tdap/Td series (2 - Td) 5/26/2027 Allergies as of 12/5/2017  Review Complete On: 12/5/2017 By: Elisha Macario NP Severity Noted Reaction Type Reactions Bactrim [Sulfamethoprim Ds]  09/16/2013    Rash Nexium [Esomeprazole Magnesium]  06/25/2010    Other (comments) Anxiety Current Immunizations  Reviewed on 12/5/2017 Name Date Hep A Vaccine 10/15/2017 Hep B Vaccine 10/15/2017 Influenza Vaccine (Quad) PF 10/13/2017, 11/2/2016, 10/21/2015, 9/26/2014 Influenza Vaccine PF 9/16/2013, 12/4/2012 11:11 AM  
 Influenza Vaccine Split 9/29/2011 Reviewed by Elisha Macario NP on 12/5/2017 at 12:37 PM  
 Reviewed by Elisha Macario NP on 12/5/2017 at 12:39 PM  
 Reviewed by Elisha Macario NP on 12/5/2017 at 12:39 PM  
You Were Diagnosed With   
  
 Codes Comments Dyslipidemia    -  Primary ICD-10-CM: E78.5 ICD-9-CM: 272.4 History of TIA (transient ischemic attack)     ICD-10-CM: Z86.73 
ICD-9-CM: V12.54 Elevated blood sugar     ICD-10-CM: R73.9 ICD-9-CM: 790.29 Acute bronchitis, unspecified organism     ICD-10-CM: J20.9 ICD-9-CM: 466.0 Vitals BP Pulse Temp Resp Height(growth percentile) Weight(growth percentile) 115/78 (BP 1 Location: Left arm, BP Patient Position: Sitting) 88 98.6 °F (37 °C) 18 5' 10\" (1.778 m) 191 lb 12.8 oz (87 kg) SpO2 BMI Smoking Status 99% 27.52 kg/m2 Former Smoker BMI and BSA Data Body Mass Index Body Surface Area 27.52 kg/m 2 2.07 m 2 Preferred Pharmacy Pharmacy Name Phone [facility] [address] [facility], [address] [place] [address] Road [phone] Your Updated Medication List  
  
   
This list is accurate as of: 12/5/17 12:59 PM.  Always use your most recent med list.  
  
  
  
  
 azithromycin 250 mg tablet Commonly known as:  Madeline Ax Take 2 tablets today, then take 1 tablet daily  
  
 clopidogrel 75 mg Tab Commonly known as:  PLAVIX Take 1 Tab by mouth daily. famotidine 20 mg tablet Commonly known as:  PEPCID Take 1 Tab by mouth two (2) times a day. simvastatin 40 mg tablet Commonly known as:  ZOCOR Take 1 Tab by mouth nightly. Triamcinolone Acetonide 55 mcg/actuation Aero nasal spray Commonly known as:  NASACORT AQ  
2 Sprays by Both Nostrils route daily as needed (nasal congestion). Prescriptions Sent to Pharmacy Refills  
 clopidogrel (PLAVIX) 75 mg tab 2 Sig: Take 1 Tab by mouth daily. Class: Normal  
 Pharmacy: [address], [address] Ph #: [phone] Route: Oral  
 simvastatin (ZOCOR) 40 mg tablet 2 Sig: Take 1 Tab by mouth nightly. Class: Normal  
 Pharmacy: [address], [address] Ph #: [phone] Route: Oral  
 azithromycin (ZITHROMAX) 250 mg tablet 0 Sig: Take 2 tablets today, then take 1 tablet daily Class: Normal  
 Pharmacy: Daisy Oreilly [address] [facility], [address] [place] [address] Ph #: [phone] We Performed the Following AMB POC HEMOGLOBIN A1C [34953 CPT(R)] Follow-up Instructions Return in about 4 months (around 4/5/2018), or if symptoms worsen or fail to improve, for lipid TIA. Patient Instructions Bronchitis: Care Instructions Your Care Instructions Bronchitis is inflammation of the bronchial tubes, which carry air to the lungs. The tubes swell and produce mucus, or phlegm. The mucus and inflamed bronchial tubes make you cough. You may have trouble breathing. Most cases of bronchitis are caused by viruses like those that cause colds. Antibiotics usually do not help and they may be harmful. Bronchitis usually develops rapidly and lasts about 2 to 3 weeks in otherwise healthy people. Follow-up care is a key part of your treatment and safety. Be sure to make and go to all appointments, and call your doctor if you are having problems. It's also a good idea to know your test results and keep a list of the medicines you take. How can you care for yourself at home? · Take all medicines exactly as prescribed. Call your doctor if you think you are having a problem with your medicine. · Get some extra rest. 
· Take an over-the-counter pain medicine, such as acetaminophen (Tylenol), ibuprofen (Advil, Motrin), or naproxen (Aleve) to reduce fever and relieve body aches. Read and follow all instructions on the label. · Do not take two or more pain medicines at the same time unless the doctor told you to. Many pain medicines have acetaminophen, which is Tylenol. Too much acetaminophen (Tylenol) can be harmful. · Take an over-the-counter cough medicine that contains dextromethorphan to help quiet a dry, hacking cough so that you can sleep. Avoid cough medicines that have more than one active ingredient. Read and follow all instructions on the label. · Breathe moist air from a humidifier, hot shower, or sink filled with hot water. The heat and moisture will thin mucus so you can cough it out. · Do not smoke. Smoking can make bronchitis worse. If you need help quitting, talk to your doctor about stop-smoking programs and medicines. These can increase your chances of quitting for good. When should you call for help? Call 911 anytime you think you may need emergency care. For example, call if: 
? · You have severe trouble breathing. ?Call your doctor now or seek immediate medical care if: 
? · You have new or worse trouble breathing. ? · You cough up dark brown or bloody mucus (sputum). ? · You have a new or higher fever. ? · You have a new rash. ? Watch closely for changes in your health, and be sure to contact your doctor if: 
? · You cough more deeply or more often, especially if you notice more mucus or a change in the color of your mucus. ? · You are not getting better as expected. Where can you learn more? Go to http://jose d-valorie.info/. Enter H333 in the search box to learn more about \"Bronchitis: Care Instructions. \" Current as of: May 12, 2017 Content Version: 11.4 © 8676-8806 [employer]. Care instructions adapted under license by [employer] (which disclaims liability or warranty for this information). If you have questions about a medical condition or this instruction, always ask your healthcare professional. [place] [address] any warranty or liability for your use of this information. Azithromycin (By mouth) Azithromycin (zu-xinj-izf-MYE-sin) Treats infections. This medicine is a macrolide antibiotic. Brand Name(s): Zithromax, Zithromax Tri-Meliton, Zithromax Z-Meliton, Zmax There may be other brand names for this medicine. When This Medicine Should Not Be Used: This medicine is not right for everyone. Do not use it if you had an allergic reaction to azithromycin, erythromycin, or similar medicines, or you have a history of liver problems caused by azithromycin. How to Use This Medicine:  
Capsule, Liquid, Packet, Powder, Tablet · Your doctor will tell you how much medicine to use. Do not use more than directed. · Take all of the medicine in your prescription to clear up your infection, even if you feel better after the first few doses. · Read and follow the patient instructions that come with this medicine. Talk to your doctor or pharmacist if you have any questions. · Multiple dose (Zithromax® oral liquid or tablets): ¨ You may take this medicine with or without food. ¨ Shake the bottle well before you measure the medicine. Measure the oral liquid medicine with a marked measuring spoon, oral syringe, or medicine cup. · Single dose (Zmax® extended-release oral liquid or powder):  
¨ Liquid: § Take this medicine on an empty stomach at least 1 hour before you eat, or 2 hours after you eat. § Call your doctor right away if you vomit within 1 hour after you take the medicine. § You must take the liquid within 12 hours after the pharmacist gives it to you. § Shake the bottle well before you measure the medicine. Measure your dose with a marked measuring spoon, cup, or syringe. ¨ Powder:  
§ Open 1 packet and pour all of the medicine into a glass with about 2 ounces (¼ cup) of water. Mix well and drink the medicine right away. Pour another 2 ounces of water into the same glass, and drink the remaining medicine. · Missed dose: If you are taking multiple doses, take the dose as soon as you remember. If it is almost time for your next dose, wait until then to take a regular dose. Do not use extra medicine to make up for a missed dose. · Store the medicine in a closed container at room temperature, away from heat, moisture, and direct light. · Extended-release oral liquid: Do not refrigerate or freeze. · Oral liquid for 1 dose only: Store at room temperature. Do not store in the refrigerator or allow the medicine to freeze. · Oral liquid for multiple doses: Store at room temperature or in the refrigerator. Use it within 10 days of filling the prescription. Drugs and Foods to Avoid: Ask your doctor or pharmacist before using any other medicine, including over-the-counter medicines, vitamins, and herbal products. · Some medicines can affect how azithromycin works.  Tell your doctor if you are also using any of the following: ¨ Cyclosporine, digoxin, nelfinavir, or phenytoin ¨ Blood thinner ¨ Ergot medicine ¨ Medicine for a heart rhythm problem (including amiodarone, dofetilide, procainamide, quinidine, sotalol) · Zithromax® for multiple doses: Do not take an antacid that contains magnesium or aluminum at the same time you take Zithromax®. An antacid will affect how the medicine works. Antacids will not affect Zmax® for single dose. Warnings While Using This Medicine: · Tell your doctor if you are pregnant or breastfeeding, or if you have kidney disease, liver disease, heart disease, heart rhythm problems, heart failure, or myasthenia gravis. Tell your doctor if anyone in your family has heart rhythm problems. · This medicine may cause the following problems:  
¨ Serious skin reactions ¨ Liver problems ¨ Infantile hypertrophic pyloric stenosis ¨ Heart rhythm problems · This medicine can cause diarrhea. Call your doctor if the diarrhea becomes severe, does not stop, or is bloody. Do not take any medicine to stop diarrhea until you have talked to your doctor. Diarrhea can occur 2 months or more after you stop taking this medicine. It may occur 2 months or more after you stop using this medicine. · Call your doctor if your symptoms do not improve or if they get worse. · Keep all medicine out of the reach of children. Never share your medicine with anyone. Possible Side Effects While Using This Medicine:  
Call your doctor right away if you notice any of these side effects: · Allergic reaction: Itching or hives, swelling in your face or hands, swelling or tingling in your mouth or throat, chest tightness, trouble breathing · Blistering, peeling, red skin rash · Dark urine, pale stools, nausea, vomiting, loss of appetite, stomach pain, yellow skin or eyes · Double vision, tiredness or weakness · Fainting, dizziness, lightheadedness · Fast, pounding, or uneven heartbeat, chest pain · Feeling irritable or vomits after feeding (in babies) · Severe diarrhea that may contain blood, stomach cramps, fever If you notice these less serious side effects, talk with your doctor: · Mild diarrhea, nausea, vomiting, stomach pain If you notice other side effects that you think are caused by this medicine, tell your doctor. Call your doctor for medical advice about side effects. You may report side effects to FDA at 7-649-TZF-8716 © 2017 [facility] Information is for End User's use only and may not be sold, redistributed or otherwise used for commercial purposes. The above information is an  only. It is not intended as medical advice for individual conditions or treatments. Talk to your doctor, nurse or pharmacist before following any medical regimen to see if it is safe and effective for you. Introducing [facility]! Dear Dony Pryor: Thank you for requesting a [employer] account. Our records indicate that you already have an active [employer] account. You can access your account anytime at [email or web address] Did you know that you can access your hospital and ER discharge instructions at any time in [employer]? You can also review all of your test results from your hospital stay or ER visit. Additional Information If you have questions, please visit the Frequently Asked Questions section of the [employer] website at [email or web address] Remember, [employer] is NOT to be used for urgent needs. For medical emergencies, dial 911. Now available from your iPhone and Android! Please provide this summary of care documentation to your next provider. Your primary care clinician is listed as [facility]. If you have any questions after today's visit, please call [phone].

## 2017-12-05 NOTE — PATIENT INSTRUCTIONS
Bronchitis: Care Instructions  Your Care Instructions    Bronchitis is inflammation of the bronchial tubes, which carry air to the lungs. The tubes swell and produce mucus, or phlegm. The mucus and inflamed bronchial tubes make you cough. You may have trouble breathing. Most cases of bronchitis are caused by viruses like those that cause colds. Antibiotics usually do not help and they may be harmful. Bronchitis usually develops rapidly and lasts about 2 to 3 weeks in otherwise healthy people. Follow-up care is a key part of your treatment and safety. Be sure to make and go to all appointments, and call your doctor if you are having problems. It's also a good idea to know your test results and keep a list of the medicines you take. How can you care for yourself at home? · Take all medicines exactly as prescribed. Call your doctor if you think you are having a problem with your medicine. · Get some extra rest.  · Take an over-the-counter pain medicine, such as acetaminophen (Tylenol), ibuprofen (Advil, Motrin), or naproxen (Aleve) to reduce fever and relieve body aches. Read and follow all instructions on the label. · Do not take two or more pain medicines at the same time unless the doctor told you to. Many pain medicines have acetaminophen, which is Tylenol. Too much acetaminophen (Tylenol) can be harmful. · Take an over-the-counter cough medicine that contains dextromethorphan to help quiet a dry, hacking cough so that you can sleep. Avoid cough medicines that have more than one active ingredient. Read and follow all instructions on the label. · Breathe moist air from a humidifier, hot shower, or sink filled with hot water. The heat and moisture will thin mucus so you can cough it out. · Do not smoke. Smoking can make bronchitis worse. If you need help quitting, talk to your doctor about stop-smoking programs and medicines. These can increase your chances of quitting for good.   When should you call for help? Call 911 anytime you think you may need emergency care. For example, call if:  ? · You have severe trouble breathing. ?Call your doctor now or seek immediate medical care if:  ? · You have new or worse trouble breathing. ? · You cough up dark brown or bloody mucus (sputum). ? · You have a new or higher fever. ? · You have a new rash. ? Watch closely for changes in your health, and be sure to contact your doctor if:  ? · You cough more deeply or more often, especially if you notice more mucus or a change in the color of your mucus. ? · You are not getting better as expected. Where can you learn more? Go to http://jose d-valorie.info/. Enter H333 in the search box to learn more about \"Bronchitis: Care Instructions. \"  Current as of: May 12, 2017  Content Version: 11.4  © 2491-3200 [employer]. Care instructions adapted under license by [employer] (which disclaims liability or warranty for this information). If you have questions about a medical condition or this instruction, always ask your healthcare professional. [place] [address] any warranty or liability for your use of this information. Azithromycin (By mouth)   Azithromycin (wr-vfwr-rpm-MYE-sin)  Treats infections. This medicine is a macrolide antibiotic. Brand Name(s): Zithromax, Zithromax Tri-Meliton, Zithromax Z-Meliton, Zmax   There may be other brand names for this medicine. When This Medicine Should Not Be Used: This medicine is not right for everyone. Do not use it if you had an allergic reaction to azithromycin, erythromycin, or similar medicines, or you have a history of liver problems caused by azithromycin. How to Use This Medicine:   Capsule, Liquid, Packet, Powder, Tablet  · Your doctor will tell you how much medicine to use. Do not use more than directed.   · Take all of the medicine in your prescription to clear up your infection, even if you feel better after the first few doses. · Read and follow the patient instructions that come with this medicine. Talk to your doctor or pharmacist if you have any questions. · Multiple dose (Zithromax® oral liquid or tablets):   ¨ You may take this medicine with or without food. ¨ Shake the bottle well before you measure the medicine. Measure the oral liquid medicine with a marked measuring spoon, oral syringe, or medicine cup. · Single dose (Zmax® extended-release oral liquid or powder):   ¨ Liquid:   § Take this medicine on an empty stomach at least 1 hour before you eat, or 2 hours after you eat. § Call your doctor right away if you vomit within 1 hour after you take the medicine. § You must take the liquid within 12 hours after the pharmacist gives it to you. § Shake the bottle well before you measure the medicine. Measure your dose with a marked measuring spoon, cup, or syringe. ¨ Powder:   § Open 1 packet and pour all of the medicine into a glass with about 2 ounces (¼ cup) of water. Mix well and drink the medicine right away. Pour another 2 ounces of water into the same glass, and drink the remaining medicine. · Missed dose: If you are taking multiple doses, take the dose as soon as you remember. If it is almost time for your next dose, wait until then to take a regular dose. Do not use extra medicine to make up for a missed dose. · Store the medicine in a closed container at room temperature, away from heat, moisture, and direct light. · Extended-release oral liquid: Do not refrigerate or freeze. · Oral liquid for 1 dose only: Store at room temperature. Do not store in the refrigerator or allow the medicine to freeze. · Oral liquid for multiple doses: Store at room temperature or in the refrigerator. Use it within 10 days of filling the prescription. Drugs and Foods to Avoid:   Ask your doctor or pharmacist before using any other medicine, including over-the-counter medicines, vitamins, and herbal products.   · Some medicines can affect how azithromycin works. Tell your doctor if you are also using any of the following:  ¨ Cyclosporine, digoxin, nelfinavir, or phenytoin  ¨ Blood thinner  [address] Medicine for a heart rhythm problem (including amiodarone, dofetilide, procainamide, quinidine, sotalol)  · Zithromax® for multiple doses: Do not take an antacid that contains magnesium or aluminum at the same time you take Zithromax®. An antacid will affect how the medicine works. Antacids will not affect Zmax® for single dose. Warnings While Using This Medicine:   · Tell your doctor if you are pregnant or breastfeeding, or if you have kidney disease, liver disease, heart disease, heart rhythm problems, heart failure, or myasthenia gravis. Tell your doctor if anyone in your family has heart rhythm problems. · This medicine may cause the following problems:   ¨ Serious skin reactions  ¨ Liver problems  ¨ Infantile hypertrophic pyloric stenosis  ¨ Heart rhythm problems  · This medicine can cause diarrhea. Call your doctor if the diarrhea becomes severe, does not stop, or is bloody. Do not take any medicine to stop diarrhea until you have talked to your doctor. Diarrhea can occur 2 months or more after you stop taking this medicine. It may occur 2 months or more after you stop using this medicine. · Call your doctor if your symptoms do not improve or if they get worse. · Keep all medicine out of the reach of children. Never share your medicine with anyone.   Possible Side Effects While Using This Medicine:   Call your doctor right away if you notice any of these side effects:  · Allergic reaction: Itching or hives, swelling in your face or hands, swelling or tingling in your mouth or throat, chest tightness, trouble breathing  · Blistering, peeling, red skin rash  · Dark urine, pale stools, nausea, vomiting, loss of appetite, stomach pain, yellow skin or eyes  · Double vision, tiredness or weakness  · Fainting, dizziness, lightheadedness  · Fast, pounding, or uneven heartbeat, chest pain  · Feeling irritable or vomits after feeding (in babies)  · Severe diarrhea that may contain blood, stomach cramps, fever  If you notice these less serious side effects, talk with your doctor:   · Mild diarrhea, nausea, vomiting, stomach pain  If you notice other side effects that you think are caused by this medicine, tell your doctor. Call your doctor for medical advice about side effects. You may report side effects to FDA at 2-072-FDA-1147  © 2017 [address] [employer] Information is for End User's use only and may not be sold, redistributed or otherwise used for commercial purposes. The above information is an  only. It is not intended as medical advice for individual conditions or treatments. Talk to your doctor, nurse or pharmacist before following any medical regimen to see if it is safe and effective for you.

## 2017-12-05 NOTE — PROGRESS NOTES
Patient here today for a routine follow up. 1. Have you been to the ER, urgent care clinic since your last visit? Hospitalized since your last visit? No    2. Have you seen or consulted any other health care providers outside of the [address] [place] [address] since your last visit? Include any pap smears or colon screening.  No

## 2017-12-06 NOTE — PROGRESS NOTES
Subjective:   Annalee Bundy is a 60 y.o. male with hyperlipidemia, elevated blood sugar when evaluated at gastroenterology  Reports has had the hepatitis A/B series at local pharmacy. New concern: Has been coughing for past month, not smoking cigarettes. ROS: denies productivity to coughing, denies sinus or nasal congestion, denies chest pain with coughing, denies fever chills. Current Outpatient Prescriptions   Medication Sig Dispense Refill    clopidogrel (PLAVIX) 75 mg tab Take 1 Tab by mouth daily. 90 Tab 2    simvastatin (ZOCOR) 40 mg tablet Take 1 Tab by mouth nightly. 90 Tab 2    azithromycin (ZITHROMAX) 250 mg tablet Take 2 tablets today, then take 1 tablet daily 6 Tab 0    famotidine (PEPCID) 20 mg tablet Take 1 Tab by mouth two (2) times a day. 60 Tab 3    Triamcinolone Acetonide (NASACORT AQ) 55 mcg/Actuation Aero nasal spray 2 Sprays by Both Nostrils route daily as needed (nasal congestion). 3 Container 3       ROS: taking medications as instructed, no medication side effects noted, no TIA's, no chest pain on exertion, no dyspnea on exertion, no swelling of ankles. Health maintenance reviewed    Objective:   Awake and alert in no acute distress  Lungs auscultated crackles anterior and posterior clears partially with cough scattered wheezing that clears with coughing  S1 S2 RRR without ectopy or murmur auscultated. Integumentary: no rashes  Normal skin turgor    Visit Vitals    /78 (BP 1 Location: Left arm, BP Patient Position: Sitting)    Pulse 88    Temp 98.6 °F (37 °C)    Resp 18    Ht 5' 10\" (1.778 m)    Wt 191 lb 12.8 oz (87 kg)    SpO2 99%    BMI 27.52 kg/m2      Results for orders placed or performed in visit on 12/05/17   AMB POC HEMOGLOBIN A1C   Result Value Ref Range    Hemoglobin A1c (POC) 5.4 %       Diagnoses and all orders for this visit:    1. Dyslipidemia  -     simvastatin (ZOCOR) 40 mg tablet; Take 1 Tab by mouth nightly.     2. History of TIA (transient ischemic attack)  -     clopidogrel (PLAVIX) 75 mg tab; Take 1 Tab by mouth daily. 3. Elevated blood sugar  -     AMB POC HEMOGLOBIN A1C    4. Acute bronchitis, unspecified organism  -     azithromycin (ZITHROMAX) 250 mg tablet; Take 2 tablets today, then take 1 tablet daily    Reviewed risks and benefits and common side effects of new medication  Patient verbalizes understanding. I have discussed the diagnosis with the patient and the intended plan as seen in the above orders. The patient has received an after-visit summary and questions were answered concerning future plans. I have discussed medication side effects and warnings with the patient as well. Follow-up Disposition:  Return in about 4 months (around 4/5/2018), or if symptoms worsen or fail to improve, for lipid TIA.

## 2018-04-03 ENCOUNTER — OFFICE VISIT (OUTPATIENT)
Dept: FAMILY MEDICINE CLINIC | Age: 63
End: 2018-04-03

## 2018-04-03 VITALS
TEMPERATURE: 98.2 F | BODY MASS INDEX: 27.63 KG/M2 | RESPIRATION RATE: 18 BRPM | HEART RATE: 95 BPM | DIASTOLIC BLOOD PRESSURE: 74 MMHG | WEIGHT: 193 LBS | OXYGEN SATURATION: 98 % | HEIGHT: 70 IN | SYSTOLIC BLOOD PRESSURE: 124 MMHG

## 2018-04-03 DIAGNOSIS — Z86.73 HISTORY OF TIA (TRANSIENT ISCHEMIC ATTACK): ICD-10-CM

## 2018-04-03 DIAGNOSIS — M25.561 ACUTE PAIN OF BOTH KNEES: ICD-10-CM

## 2018-04-03 DIAGNOSIS — E78.5 DYSLIPIDEMIA: Primary | ICD-10-CM

## 2018-04-03 DIAGNOSIS — M25.562 ACUTE PAIN OF BOTH KNEES: ICD-10-CM

## 2018-04-03 PROBLEM — R76.8 HEPATITIS C ANTIBODY POSITIVE IN BLOOD: Status: RESOLVED | Noted: 2017-05-31 | Resolved: 2018-04-03

## 2018-04-03 NOTE — PROGRESS NOTES
1. Have you been to the ER, urgent care clinic since your last visit? Hospitalized since your last visit? Seen at [facility]    2. Have you seen or consulted any other health care providers outside of the [facility] since your last visit? Include any pap smears or colon screening.  No

## 2018-04-03 NOTE — MR AVS SNAPSHOT
[address] [place] [address] 
 
 
 [address] [address] 
[phone] Patient: Sosa Anand MRN:  [id number] Visit Information Date & Time Provider Department Dept. Phone Encounter #  
 4/3/2018 12:00 PM VIVIENNE Hartmannina Simslinda [address] [place] [address] [phone] [id number] Follow-up Instructions Return in about 4 months (around 8/3/2018), or if symptoms worsen or fail to improve, for dyslipidemia. Upcoming Health Maintenance Date Due DTaP/Tdap/Td series (2 - Td) 2027 COLONOSCOPY 10/2/2027 Allergies as of 4/3/2018  Review Complete On: 4/3/2018 By: Riaz Lombardi NP Severity Noted Reaction Type Reactions Bactrim [Sulfamethoprim Ds]  2013    Rash Nexium [Esomeprazole Magnesium]  2010    Other (comments) Anxiety Current Immunizations  Reviewed on 2017 Name Date Hep A Vaccine 10/15/2017 Hep B Vaccine 10/15/2017 Influenza Vaccine (Quad) PF 10/13/2017, 2016, 10/21/2015, 2014 Influenza Vaccine PF 2013, 2012 11:11 AM  
 Influenza Vaccine Split 2011 Not reviewed this visit You Were Diagnosed With   
  
 Codes Comments Dyslipidemia    -  Primary ICD-10-CM: E78.5 ICD-9-CM: 272.4 Acute pain of both knees     ICD-10-CM: M25.561, M25.562 ICD-9-CM: 338.19, 719.46 History of TIA (transient ischemic attack)     ICD-10-CM: Z86.73 
ICD-9-CM: V12.54 Vitals BP Pulse Temp Resp Height(growth percentile) Weight(growth percentile) 124/74 (BP 1 Location: Left arm, BP Patient Position: Sitting) 95 98.2 °F (36.8 °C) (Oral) 18 5' 10\" (1.778 m) 193 lb (87.5 kg) SpO2 BMI Smoking Status 98% 27.69 kg/m2 Former Smoker Vitals History BMI and BSA Data Body Mass Index Body Surface Area  
 27.69 kg/m 2 2.08 m 2 Preferred Pharmacy Pharmacy Name Phone [facility] Kang [address], [address] [phone] Your Updated Medication List  
  
   
This list is accurate as of 4/3/18 12:25 PM.  Always use your most recent med list.  
  
  
  
  
 clopidogrel 75 mg Tab Commonly known as:  PLAVIX Take 1 Tab by mouth daily. famotidine 20 mg tablet Commonly known as:  PEPCID Take 1 Tab by mouth two (2) times a day. simvastatin 40 mg tablet Commonly known as:  ZOCOR Take 1 Tab by mouth nightly. Triamcinolone Acetonide 55 mcg/actuation Aero nasal spray Commonly known as:  NASACORT AQ  
2 Sprays by Both Nostrils route daily as needed (nasal congestion). Follow-up Instructions Return in about 4 months (around 8/3/2018), or if symptoms worsen or fail to improve, for dyslipidemia. To-Do List   
 04/03/2018 Lab:  LIPID PANEL Patient Instructions Patellofemoral Pain Syndrome (Runner's Knee): Exercises Your Care Instructions Here are some examples of typical rehabilitation exercises for your condition. Start each exercise slowly. Ease off the exercise if you start to have pain. Your doctor or physical therapist will tell you when you can start these exercises and which ones will work best for you. How to do the exercises Calf wall stretch 1. Stand facing a wall with your hands on the wall at about eye level. Put your affected leg about a step behind your other leg. 2. Keeping your back leg straight and your back heel on the floor, bend your front knee and gently bring your hip and chest toward the wall until you feel a stretch in the calf of your back leg. 3. Hold the stretch for at least 15 to 30 seconds. 4. Repeat 2 to 4 times. 5. Repeat steps 1 through 4, but this time keep your back knee bent. Quadriceps stretch 1. If you are not steady on your feet, hold on to a chair, counter, or wall.  
2. Bend your affected leg, and reach behind you to grab the front of your foot or ankle with the hand on the same side. For example, if you are stretching your right leg, use your right hand. 3. Keeping your knees next to each other, pull your foot toward your buttock until you feel a gentle stretch across the front of your hip and down the front of your thigh. Your knee should be pointed directly to the ground, and not out to the side. 4. Hold the stretch for at least 15 to 30 seconds. 5. Repeat 2 to 4 times. Hamstring wall stretch 1. Lie on your back in a doorway, with your good leg through the open door. 2. Slide your affected leg up the wall to straighten your knee. You should feel a gentle stretch down the back of your leg. 1. Do not arch your back. 2. Do not bend either knee. 3. Keep one heel touching the floor and the other heel touching the wall. Do not point your toes. 3. Hold the stretch for at least 1 minute. Then over time, try to lengthen the time you hold the stretch to as long as 6 minutes. 4. Repeat 2 to 4 times. 5. If you do not have a place to do this exercise in a doorway, there is another way to do it: 6. Lie on your back, and bend your affected leg. 7. Loop a towel under the ball and toes of that foot, and hold the ends of the towel in your hands. 8. Straighten your knee, and slowly pull back on the towel. You should feel a gentle stretch down the back of your leg. 9. Hold the stretch for at least 15 to 30 seconds. Or even better, hold the stretch for 1 minute if you can. 10. Repeat 2 to 4 times. BridgeLux 1. Sit with your affected leg straight and supported on the floor or a firm bed. Place a small, rolled-up towel under your affected knee. Your other leg should be bent, with that foot flat on the floor. 2. Tighten the thigh muscles of your affected leg by pressing the back of your knee down into the towel. 3. Hold for about 6 seconds, then rest for up to 10 seconds. 4. Repeat 8 to 12 times. Straight-leg raises to the front 1. Lie on your back with your good knee bent so that your foot rests flat on the floor. Your affected leg should be straight. Make sure that your low back has a normal curve. You should be able to slip your hand in between the floor and the small of your back, with your palm touching the floor and your back touching the back of your hand. 2. Tighten the thigh muscles in your affected leg by pressing the back of your knee flat down to the floor. Hold your knee straight. 3. Keeping the thigh muscles tight and your leg straight, lift your affected leg up so that your heel is about 12 inches off the floor. 4. Hold for about 6 seconds, then lower your leg slowly. Rest for up to 10 seconds between repetitions. 5. Repeat 8 to 12 times. Straight-leg raises to the back 1. Lie on your stomach, and lift your leg straight up behind you (toward the ceiling). 2. Lift your toes about 6 inches off the floor, hold for about 6 seconds, then lower slowly. 3. Do 8 to 12 repetitions. Wall slide with ball squeeze 1. Stand with your back against a wall and with your feet about shoulder-width apart. Your feet should be about 12 inches away from the wall. 2. Put a ball about the size of a soccer ball between your knees. Then slowly slide down the wall until your knees are bent about 20 to 30 degrees. 3. Tighten your thigh muscles by squeezing the ball between your knees. Hold that position for about 10 seconds, then stop squeezing. Rest for up to 10 seconds between repetitions. 4. Repeat 8 to 12 times. Follow-up care is a key part of your treatment and safety. Be sure to make and go to all appointments, and call your doctor if you are having problems. It's also a good idea to know your test results and keep a list of the medicines you take. Where can you learn more? Go to http://jose d-valorie.info/.  
Enter A404 in the search box to learn more about \"Patellofemoral Pain Syndrome (Runner's Knee): Exercises. \" Current as of: March 21, 2017 Content Version: 11.4 © 6536-4297 [employer]. Care instructions adapted under license by [employer] (which disclaims liability or warranty for this information). If you have questions about a medical condition or this instruction, always ask your healthcare professional. [address] any warranty or liability for your use of this information. Introducing hospitals & HEALTH SERVICES! Dear Caio Hughes: Thank you for requesting a [facility] account. Our records indicate that you already have an active [facility] account. You can access your account anytime at [email or web address] Did you know that you can access your hospital and ER discharge instructions at any time in [facility]? You can also review all of your test results from your hospital stay or ER visit. Additional Information If you have questions, please visit the Frequently Asked Questions section of the [facility] [facility] website at [email or web address] Remember, [facility] is NOT to be used for urgent needs. For medical emergencies, dial 911. Now available from your iPhone and Android! Please provide this summary of care documentation to your next provider. Your primary care clinician is listed as Maya Nuñez. If you have any questions after today's visit, please call [phone].

## 2018-04-03 NOTE — PATIENT INSTRUCTIONS
Patellofemoral Pain Syndrome (Runner's Knee): Exercises  Your Care Instructions  Here are some examples of typical rehabilitation exercises for your condition. Start each exercise slowly. Ease off the exercise if you start to have pain. Your doctor or physical therapist will tell you when you can start these exercises and which ones will work best for you. How to do the exercises  Calf wall stretch    1. Stand facing a wall with your hands on the wall at about eye level. Put your affected leg about a step behind your other leg. 2. Keeping your back leg straight and your back heel on the floor, bend your front knee and gently bring your hip and chest toward the wall until you feel a stretch in the calf of your back leg. 3. Hold the stretch for at least 15 to 30 seconds. 4. Repeat 2 to 4 times. 5. Repeat steps 1 through 4, but this time keep your back knee bent. Quadriceps stretch    1. If you are not steady on your feet, hold on to a chair, counter, or wall. 2. Bend your affected leg, and reach behind you to grab the front of your foot or ankle with the hand on the same side. For example, if you are stretching your right leg, use your right hand. 3. Keeping your knees next to each other, pull your foot toward your buttock until you feel a gentle stretch across the front of your hip and down the front of your thigh. Your knee should be pointed directly to the ground, and not out to the side. 4. Hold the stretch for at least 15 to 30 seconds. 5. Repeat 2 to 4 times. Hamstring wall stretch    1. Lie on your back in a doorway, with your good leg through the open door. 2. Slide your affected leg up the wall to straighten your knee. You should feel a gentle stretch down the back of your leg. 1. Do not arch your back. 2. Do not bend either knee. 3. Keep one heel touching the floor and the other heel touching the wall. Do not point your toes. 3. Hold the stretch for at least 1 minute.  Then over time, try to lengthen the time you hold the stretch to as long as 6 minutes. 4. Repeat 2 to 4 times. 5. If you do not have a place to do this exercise in a doorway, there is another way to do it:  6. Lie on your back, and bend your affected leg. 7. Loop a towel under the ball and toes of that foot, and hold the ends of the towel in your hands. 8. Straighten your knee, and slowly pull back on the towel. You should feel a gentle stretch down the back of your leg. 9. Hold the stretch for at least 15 to 30 seconds. Or even better, hold the stretch for 1 minute if you can. 10. Repeat 2 to 4 times. Quad sets    1. Sit with your affected leg straight and supported on the floor or a firm bed. Place a small, rolled-up towel under your affected knee. Your other leg should be bent, with that foot flat on the floor. 2. Tighten the thigh muscles of your affected leg by pressing the back of your knee down into the towel. 3. Hold for about 6 seconds, then rest for up to 10 seconds. 4. Repeat 8 to 12 times. Straight-leg raises to the front    1. Lie on your back with your good knee bent so that your foot rests flat on the floor. Your affected leg should be straight. Make sure that your low back has a normal curve. You should be able to slip your hand in between the floor and the small of your back, with your palm touching the floor and your back touching the back of your hand. 2. Tighten the thigh muscles in your affected leg by pressing the back of your knee flat down to the floor. Hold your knee straight. 3. Keeping the thigh muscles tight and your leg straight, lift your affected leg up so that your heel is about 12 inches off the floor. 4. Hold for about 6 seconds, then lower your leg slowly. Rest for up to 10 seconds between repetitions. 5. Repeat 8 to 12 times. Straight-leg raises to the back    1. Lie on your stomach, and lift your leg straight up behind you (toward the ceiling).   2. Lift your toes about 6 inches off the floor, hold for about 6 seconds, then lower slowly. 3. Do 8 to 12 repetitions. Wall slide with ball squeeze    1. Stand with your back against a wall and with your feet about shoulder-width apart. Your feet should be about 12 inches away from the wall. 2. Put a ball about the size of a soccer ball between your knees. Then slowly slide down the wall until your knees are bent about 20 to 30 degrees. 3. Tighten your thigh muscles by squeezing the ball between your knees. Hold that position for about 10 seconds, then stop squeezing. Rest for up to 10 seconds between repetitions. 4. Repeat 8 to 12 times. Follow-up care is a key part of your treatment and safety. Be sure to make and go to all appointments, and call your doctor if you are having problems. It's also a good idea to know your test results and keep a list of the medicines you take. Where can you learn more? Go to http://jose d-valorie.info/. Enter A404 in the search box to learn more about \"Patellofemoral Pain Syndrome (Runner's Knee): Exercises. \"  Current as of: March 21, 2017  Content Version: 11.4  © 1286-9514 [employer]. Care instructions adapted under license by [employer] (which disclaims liability or warranty for this information). If you have questions about a medical condition or this instruction, always ask your healthcare professional. [place] [address] any warranty or liability for your use of this information.

## 2018-04-03 NOTE — PROGRESS NOTES
Subjective:   Marilyn Wills is a 60 y.o. male here today to follow up hyperlipidemia, history of TIA taking all medications  Patient is complaining of anterior knee pain for past two months. Aching when he is walking. No pain today. ROS: denies swelling of knees, denies warmth to palpation, denies trauma to knees denies back pain   ROS: taking medications as instructed, no medication side effects noted, no TIA's, no chest pain on exertion, no dyspnea on exertion, no swelling of ankles. HCV not detected February 2018 see scanned copy (history of chronic hepatitis C --- [facility])    Current Outpatient Prescriptions   Medication Sig Dispense Refill    clopidogrel (PLAVIX) 75 mg tab Take 1 Tab by mouth daily. 90 Tab 2    simvastatin (ZOCOR) 40 mg tablet Take 1 Tab by mouth nightly. 90 Tab 2    famotidine (PEPCID) 20 mg tablet Take 1 Tab by mouth two (2) times a day. 60 Tab 3    Triamcinolone Acetonide (NASACORT AQ) 55 mcg/Actuation Aero nasal spray 2 Sprays by Both Nostrils route daily as needed (nasal congestion).  3 Container 3      Patient Active Problem List   Diagnosis Code    GERD (gastroesophageal reflux disease) K21.9    Seasonal allergic rhinitis J30.2    Hemorrhoids, internal K64.8    Polyp of colon K63.5    Enlarged prostate N40.0    Transient ischemic attack G45.9    Dyslipidemia E78.5    MCKENNA (obstructive sleep apnea) G47.33    Hepatitis C antibody positive in blood R76.8     Lab Results   Component Value Date/Time    Cholesterol, total 124 05/26/2017 12:37 PM    HDL Cholesterol 72 (H) 05/26/2017 12:37 PM    LDL, calculated 43.2 05/26/2017 12:37 PM    VLDL, calculated 8.8 05/26/2017 12:37 PM    Triglyceride 44 05/26/2017 12:37 PM    CHOL/HDL Ratio 1.7 05/26/2017 12:37 PM     Lab Results   Component Value Date/Time    Sodium 139 04/10/2017 01:10 PM    Potassium 4.2 04/10/2017 01:10 PM    Chloride 105 04/10/2017 01:10 PM    CO2 27 04/10/2017 01:10 PM    Anion gap 7 04/10/2017 01:10 PM Glucose 90 04/10/2017 01:10 PM    BUN 10 04/10/2017 01:10 PM    Creatinine 0.93 04/10/2017 01:10 PM    BUN/Creatinine ratio 11 (L) 04/10/2017 01:10 PM    GFR est AA >60 04/10/2017 01:10 PM    GFR est non-AA >60 04/10/2017 01:10 PM    Calcium 8.8 04/10/2017 01:10 PM    Bilirubin, total 0.4 05/26/2017 12:37 PM    AST (SGOT) 47 (H) 05/26/2017 12:37 PM    Alk. phosphatase 62 05/26/2017 12:37 PM    Protein, total 7.5 05/26/2017 12:37 PM    Albumin 3.8 05/26/2017 12:37 PM    Globulin 3.7 05/26/2017 12:37 PM    A-G Ratio 1.0 05/26/2017 12:37 PM    ALT (SGPT) 68 (H) 05/26/2017 12:37 PM     Lab Results   Component Value Date/Time    WBC 7.0 04/10/2017 01:10 PM    HGB 16.3 (H) 04/10/2017 01:10 PM    HCT 47.9 04/10/2017 01:10 PM    PLATELET 303 63/09/1315 01:10 PM    MCV 98.0 (H) 04/10/2017 01:10 PM     Wt Readings from Last 3 Encounters:   12/05/17 191 lb 12.8 oz (87 kg)   10/13/17 187 lb 6.4 oz (85 kg)   05/26/17 191 lb (86.6 kg)     BP Readings from Last 3 Encounters:   12/05/17 115/78   10/13/17 118/70   05/26/17 128/82     PMH: reviewed medications and allergy lists and medical and family history. OBJECTIVE:  Awake and alert in no acute distress  Neck supple without lymphadenopathy, no carotid artery bruits auscultated bilaterally. Lungs clear throughout  S1 S2 RRR without ectopy or murmur auscultated. Knee exam:  Right knee: No erythema, edema, or bruising. Nontender to patellar tendon or tibial tuberosity. No joint line tenderness laterally or medially. No patellar facet tenderness. No laxity to cruiciate or collateral ligaments. No menisceal sign medial or lateral aspect. mild TTP distal quadricepts tendon  Left knee: No erythema, edema, or bruising. Nontender to patellar tendon or tibial tuberosity. No joint line tenderness laterally or medially. No patellar facet tenderness. No laxity to cruiciate or collateral ligaments. No menisceal sign medial or lateral aspect.  mild TTP distal quadricepts tendon  No gait abnormality  Visit Vitals    /74 (BP 1 Location: Left arm, BP Patient Position: Sitting)    Pulse 95    Temp 98.2 °F (36.8 °C) (Oral)    Resp 18    Ht 5' 10\" (1.778 m)    Wt 193 lb (87.5 kg)    SpO2 98%    BMI 27.69 kg/m2     Diagnoses and all orders for this visit:    1. Dyslipidemia  -     LIPID PANEL; Future    2. Acute pain of both knees    3. History of TIA (transient ischemic attack)       mediterranean diet introduced patient verbalizes understanding  Anticipatory guidance and exercises for patellofemoral syndrome  Patient verbalizes understanding. I have discussed the diagnosis with the patient and the intended plan as seen in the above orders. The patient has received an after-visit summary and questions were answered concerning future plans. I have discussed medication side effects and warnings with the patient as well. Follow-up Disposition:  Return in about 4 months (around 8/3/2018), or if symptoms worsen or fail to improve, for dyslipidemia.

## 2018-04-04 ENCOUNTER — HOSPITAL ENCOUNTER (OUTPATIENT)
Dept: LAB | Age: 63
Discharge: HOME | End: 2018-04-04
Payer: COMMERCIAL

## 2018-04-04 DIAGNOSIS — E78.5 DYSLIPIDEMIA: ICD-10-CM

## 2018-04-04 LAB
CHOLEST SERPL-MCNC: 144 MG/DL
HDLC SERPL-MCNC: 80 MG/DL (ref 40–60)
HDLC SERPL: 1.8 {RATIO} (ref 0–5)
LDLC SERPL CALC-MCNC: 50.6 MG/DL (ref 0–100)
LIPID PROFILE,FLP: ABNORMAL
TRIGL SERPL-MCNC: 67 MG/DL (ref ?–150)
VLDLC SERPL CALC-MCNC: 13.4 MG/DL

## 2018-04-04 PROCEDURE — 36415 COLL VENOUS BLD VENIPUNCTURE: CPT | Performed by: NURSE PRACTITIONER

## 2018-04-04 PROCEDURE — 80061 LIPID PANEL: CPT | Performed by: NURSE PRACTITIONER

## 2018-08-07 ENCOUNTER — OFFICE VISIT (OUTPATIENT)
Dept: FAMILY MEDICINE CLINIC | Age: 63
End: 2018-08-07

## 2018-08-07 VITALS
OXYGEN SATURATION: 98 % | TEMPERATURE: 98.4 F | BODY MASS INDEX: 26.92 KG/M2 | SYSTOLIC BLOOD PRESSURE: 132 MMHG | HEIGHT: 70 IN | RESPIRATION RATE: 18 BRPM | WEIGHT: 188 LBS | HEART RATE: 100 BPM | DIASTOLIC BLOOD PRESSURE: 75 MMHG

## 2018-08-07 DIAGNOSIS — R35.0 BENIGN PROSTATIC HYPERPLASIA WITH URINARY FREQUENCY: ICD-10-CM

## 2018-08-07 DIAGNOSIS — R03.0 ELEVATED BLOOD-PRESSURE READING, WITHOUT DIAGNOSIS OF HYPERTENSION: ICD-10-CM

## 2018-08-07 DIAGNOSIS — N40.1 BENIGN PROSTATIC HYPERPLASIA WITH URINARY FREQUENCY: ICD-10-CM

## 2018-08-07 DIAGNOSIS — E78.5 DYSLIPIDEMIA: Primary | ICD-10-CM

## 2018-08-07 RX ORDER — TERAZOSIN 1 MG/1
1 CAPSULE ORAL
Qty: 30 CAP | Refills: 2 | Status: SHIPPED | OUTPATIENT
Start: 2018-08-07 | End: 2019-06-13

## 2018-08-07 NOTE — PROGRESS NOTES
1. Have you been to the ER, urgent care clinic since your last visit? Hospitalized since your last visit? No    2. Have you seen or consulted any other health care providers outside of the [address] [place] [address] since your last visit? Include any pap smears or colon screening.  No

## 2018-08-07 NOTE — PROGRESS NOTES
Subjective:   Marah Nichole is a 60 y.o. male here today to follow up with dyslipidemia, history of TIA.     ROS: taking medications as instructed, no medication side effects noted, no TIA's, no chest pain on exertion, no dyspnea on exertion, no swelling of ankles. New concern: patient is complaining of getting up 3-4 times per night has BPH. Discussed medication therapies risks and benefits. Patient agrees with plan and verbalizes understanding. Current Outpatient Prescriptions   Medication Sig Dispense Refill    clopidogrel (PLAVIX) 75 mg tab Take 1 Tab by mouth daily. 90 Tab 2    simvastatin (ZOCOR) 40 mg tablet Take 1 Tab by mouth nightly. 90 Tab 2    famotidine (PEPCID) 20 mg tablet Take 1 Tab by mouth two (2) times a day. 60 Tab 3    Triamcinolone Acetonide (NASACORT AQ) 55 mcg/Actuation Aero nasal spray 2 Sprays by Both Nostrils route daily as needed (nasal congestion).  3 Container 3      Patient Active Problem List   Diagnosis Code    GERD (gastroesophageal reflux disease) K21.9    Seasonal allergic rhinitis J30.2    Hemorrhoids, internal K64.8    Polyp of colon K63.5    Enlarged prostate N40.0    Dyslipidemia E78.5    MCKENNA (obstructive sleep apnea) G47.33    History of TIA (transient ischemic attack) Z86.73     Lab Results   Component Value Date/Time    Cholesterol, total 144 04/04/2018 09:55 AM    HDL Cholesterol 80 (H) 04/04/2018 09:55 AM    LDL, calculated 50.6 04/04/2018 09:55 AM    VLDL, calculated 13.4 04/04/2018 09:55 AM    Triglyceride 67 04/04/2018 09:55 AM    CHOL/HDL Ratio 1.8 04/04/2018 09:55 AM     Lab Results   Component Value Date/Time    Sodium 139 04/10/2017 01:10 PM    Potassium 4.2 04/10/2017 01:10 PM    Chloride 105 04/10/2017 01:10 PM    CO2 27 04/10/2017 01:10 PM    Anion gap 7 04/10/2017 01:10 PM    Glucose 90 04/10/2017 01:10 PM    BUN 10 04/10/2017 01:10 PM    Creatinine 0.93 04/10/2017 01:10 PM    BUN/Creatinine ratio 11 (L) 04/10/2017 01:10 PM    GFR est AA >60 04/10/2017 01:10 PM    GFR est non-AA >60 04/10/2017 01:10 PM    Calcium 8.8 04/10/2017 01:10 PM    Bilirubin, total 0.4 05/26/2017 12:37 PM    AST (SGOT) 47 (H) 05/26/2017 12:37 PM    Alk. phosphatase 62 05/26/2017 12:37 PM    Protein, total 7.5 05/26/2017 12:37 PM    Albumin 3.8 05/26/2017 12:37 PM    Globulin 3.7 05/26/2017 12:37 PM    A-G Ratio 1.0 05/26/2017 12:37 PM    ALT (SGPT) 68 (H) 05/26/2017 12:37 PM     Lab Results   Component Value Date/Time    WBC 7.0 04/10/2017 01:10 PM    HGB 16.3 (H) 04/10/2017 01:10 PM    HCT 47.9 04/10/2017 01:10 PM    PLATELET 713 26/81/9649 01:10 PM    MCV 98.0 (H) 04/10/2017 01:10 PM     Wt Readings from Last 3 Encounters:   04/03/18 193 lb (87.5 kg)   12/05/17 191 lb 12.8 oz (87 kg)   10/13/17 187 lb 6.4 oz (85 kg)     BP Readings from Last 3 Encounters:   04/03/18 124/74   12/05/17 115/78   10/13/17 118/70   PMH: reviewed medications and allergy lists and medical and family history. OBJECTIVE:  Awake and alert in no acute distress  Neck supple without lymphadenopathy, no carotid artery bruits auscultated bilaterally. No thyromegaly  Lungs clear throughout  S1 S2 RRR without ectopy or murmur auscultated. Abdomen: normoactive bowel sounds all quadrants, no tenderness to abdomen upper and lower quadrants. No hepatosplenomegaly  Extremities: no clubbing, cyanosis, peripheral edema    Visit Vitals    /75 (BP 1 Location: Left arm, BP Patient Position: Sitting)    Pulse 100    Temp 98.4 °F (36.9 °C) (Oral)    Resp 18    Ht 5' 10\" (1.778 m)    Wt 188 lb (85.3 kg)    SpO2 98%    BMI 26.98 kg/m2       Diagnoses and all orders for this visit:    1. Dyslipidemia    2. Benign prostatic hyperplasia with urinary frequency  -     terazosin (HYTRIN) 1 mg capsule; Take 1 Cap by mouth nightly.     3. Elevated blood-pressure reading, without diagnosis of hypertension      Reviewed risks and benefits and common side effects of new medication  Patient verbalizes understanding. I have discussed the diagnosis with the patient and the intended plan as seen in the above orders. The patient has received an after-visit summary and questions were answered concerning future plans. I have discussed medication side effects and warnings with the patient as well. Follow-up Disposition:  Return in about 3 months (around 11/7/2018) for follow up BPH.

## 2018-08-07 NOTE — PATIENT INSTRUCTIONS
High Cholesterol: Care Instructions  Your Care Instructions    Cholesterol is a type of fat in your blood. It is needed for many body functions, such as making new cells. Cholesterol is made by your body. It also comes from food you eat. High cholesterol means that you have too much of the fat in your blood. This raises your risk of a heart attack and stroke. LDL and HDL are part of your total cholesterol. LDL is the \"bad\" cholesterol. High LDL can raise your risk for heart disease, heart attack, and stroke. HDL is the \"good\" cholesterol. It helps clear bad cholesterol from the body. High HDL is linked with a lower risk of heart disease, heart attack, and stroke. Your cholesterol levels help your doctor find out your risk for having a heart attack or stroke. You and your doctor can talk about whether you need to lower your risk and what treatment is best for you. A heart-healthy lifestyle along with medicines can help lower your cholesterol and your risk. The way you choose to lower your risk will depend on how high your risk is for heart attack and stroke. It will also depend on how you feel about taking medicines. Follow-up care is a key part of your treatment and safety. Be sure to make and go to all appointments, and call your doctor if you are having problems. It's also a good idea to know your test results and keep a list of the medicines you take. How can you care for yourself at home? · Eat a variety of foods every day. Good choices include fruits, vegetables, whole grains (like oatmeal), dried beans and peas, nuts and seeds, soy products (like tofu), and fat-free or low-fat dairy products. · Replace butter, margarine, and hydrogenated or partially hydrogenated oils with olive and canola oils. (Canola oil margarine without trans fat is fine.)  · Replace red meat with fish, poultry, and soy protein (like tofu). · Limit processed and packaged foods like chips, crackers, and cookies.   · Bake, broil, or steam foods. Don't ferugson them. · Be physically active. Get at least 30 minutes of exercise on most days of the week. Walking is a good choice. You also may want to do other activities, such as running, swimming, cycling, or playing tennis or team sports. · Stay at a healthy weight or lose weight by making the changes in eating and physical activity listed above. Losing just a small amount of weight, even 5 to 10 pounds, can reduce your risk for having a heart attack or stroke. · Do not smoke. When should you call for help? Watch closely for changes in your health, and be sure to contact your doctor if:    · You need help making lifestyle changes.     · You have questions about your medicine. Where can you learn more? Go to http://jose dOximityvalorie.info/. Enter M757 in the search box to learn more about \"High Cholesterol: Care Instructions. \"  Current as of: May 10, 2017  Content Version: 11.7  © 1393-4765 [employer]. Care instructions adapted under license by [employer] (which disclaims liability or warranty for this information). If you have questions about a medical condition or this instruction, always ask your healthcare professional. [address] any warranty or liability for your use of this information. Benign Prostatic Hyperplasia: Care Instructions  Your Care Instructions    Benign prostatic hyperplasia, or BPH, is an enlarged prostate gland. The prostate is a small gland that makes some of the fluid in semen. Prostate enlargement happens to almost all men as they age. It is usually not serious. BPH does not cause prostate cancer. As the prostate gets bigger, it may partly block the flow of urine. You may have a hard time getting a urine stream started or completely stopped. BPH can cause dribbling. You may have a weak urine stream, or you may have to urinate more often than you used to, especially at night.  Most men find these problems easy to manage. You do not need treatment unless your symptoms bother you a lot or you have other problems, such as bladder infections or stones. In these cases, medicines may help. Surgery is not needed unless the urine flow is blocked or the symptoms do not get better with medicine. Follow-up care is a key part of your treatment and safety. Be sure to make and go to all appointments, and call your doctor if you are having problems. It's also a good idea to know your test results and keep a list of the medicines you take. How can you care for yourself at home? · Take plenty of time to urinate. Try to relax. · Try \"double voiding. \" Urinate as much you can, relax for a few moments, and then try to urinate again. · Sit on the toilet to urinate. · Read or think of other things while you are waiting. · Turn on a faucet, or try to picture running water. Some men find that this helps get their urine flowing. · If dribbling is a problem, wash your penis daily to avoid skin irritation and infection. · Avoid caffeine and alcohol. These drinks will increase how often you need to urinate. Spread your fluid intake throughout the day. If the urge to urinate often wakes you at night, limit your fluid intake in the evening. Urinate right before you go to bed. · Many over-the-counter cold and allergy medicines can make the symptoms of BPH worse. Avoid antihistamines, decongestants, and allergy pills, if you can. Read the warnings on the package. · If you take any prescription medicines, especially tranquilizers or antidepressants, ask your doctor or pharmacist whether they can cause urination problems. There may be other medicines you can use that do not cause urinary problems. · Be safe with medicines. Take your medicines exactly as prescribed. Call your doctor if you think you are having a problem with your medicine. When should you call for help?   Call your doctor now or seek immediate medical care if:    · You cannot urinate at all.     · You have symptoms of a urinary infection. For example:  ¨ You have blood or pus in your urine. ¨ You have pain in your back just below your rib cage. This is called flank pain. ¨ You have a fever, chills, or body aches. ¨ It hurts to urinate. ¨ You have groin or belly pain.    Watch closely for changes in your health, and be sure to contact your doctor if:    · It hurts when you ejaculate.     · Your urinary problems get a lot worse or bother you a lot. Where can you learn more? Go to http://jose d-valorie.info/. Enter G324 in the search box to learn more about \"Benign Prostatic Hyperplasia: Care Instructions. \"  Current as of: December 3, 2017  Content Version: 11.7  © 9131-4219 [employer]. Care instructions adapted under license by [employer] (which disclaims liability or warranty for this information). If you have questions about a medical condition or this instruction, always ask your healthcare professional. [address] any warranty or liability for your use of this information. Terazosin (By mouth)   Terazosin (ter-AZ-oh-sin)  Treats high blood pressure and enlarged prostate (benign prostatic hyperplasia (BPH)). Brand Name(s):   There may be other brand names for this medicine. When This Medicine Should Not Be Used: You should not use this medicine if you have ever had an allergic reaction to terazosin. How to Use This Medicine:   Capsule, Tablet, Liquid Filled Capsule  · Your doctor will tell you how much of this medicine to take and how often. Your dose may need to be changed several times in order to find out what works best for you. Do not take more medicine or take it more often than your doctor tells you to. · Take this medicine at bedtime unless your doctor tells you otherwise. · Carefully follow your doctor's instructions about any special diet.   If a dose is missed:   · If you miss a dose or forget to take your medicine, take it as soon as you can. If it is almost time for your next dose, wait until then to take the medicine and skip the missed dose. Do not use extra medicine to make up for a missed dose. How to Store and Dispose of This Medicine:   · Store the medicine at room temperature in a closed container, away from heat, moisture, and direct light. Ask your pharmacist, doctor, or health caregiver about the best way to dispose of any outdated medicine or medicine no longer needed. · Keep all medicine away from children and never share your medicine with anyone. Drugs and Foods to Avoid:   Ask your doctor or pharmacist before using any other medicine, including over-the-counter medicines, vitamins, and herbal products. · Make sure your doctor knows if you are also using verapamil (Silva Call), diuretics or \"water pills\" (such as furosemide, hydrochlorothiazide, Aldactazide®, Aldactone®, Dyazide®, Hyzaar®, Lasix®, Lotrel®, Maxzide®, Moduretic®, Norvasc®, Zestoretic®), or any other blood pressure medicine (such as atenolol, metoprolol, Accupril®, Altace®, Cardizem®, Cardura®, Lotensin®, Lotrel®, Monopril®, Plendil®, Prinivil®, Tiazac®, Toprol®, Vasotec®, Zestril®). · Do not drink alcohol while you are using this medicine. Warnings While Using This Medicine:   · Make sure your doctor knows if you are pregnant or breastfeeding. · If you stop using this medicine, your blood pressure may go up. High blood pressure usually has no symptoms. Even if you feel well, do not stop using the medicine without asking your doctor. · Do not stop using this medicine without asking your doctor. If you have not taken the medicine for several days, you will need to use a lower dose when you start taking it again. · Your doctor will need to check your progress at regular visits while you are using this medicine. Be sure to keep all appointments. · This medicine may make you dizzy or drowsy. Avoid driving, using machines, or doing anything else that could be dangerous if you are not alert. Possible Side Effects While Using This Medicine:   Call your doctor right away if you notice any of these side effects:  · Allergic reaction: Itching or hives, swelling in face, lips, or hands, swelling or tingling in the mouth or throat, tightness in chest, trouble breathing  · Chest pain (may be related to your disease and not a side effect)  · Fast or pounding heartbeat  · Fever, chills, sore throat  · Lightheadedness or fainting  · Numbness or tingling in the hands or feet  · Prolonged or painful erection  · Swelling in the hands, ankles, or feet, rapid weight gain  If you notice these less serious side effects, talk with your doctor:   · Blurred vision  · Muscle or joint pain  · Nausea  · Problems with sex  · Runny or stuffy nose  · Tiredness  If you notice other side effects that you think are caused by this medicine, tell your doctor. Call your doctor for medical advice about side effects. You may report side effects to FDA at 3-449-FDA-2263  © 2017 [address] Information is for End User's use only and may not be sold, redistributed or otherwise used for commercial purposes. The above information is an  only. It is not intended as medical advice for individual conditions or treatments. Talk to your doctor, nurse or pharmacist before following any medical regimen to see if it is safe and effective for you.

## 2018-09-03 DIAGNOSIS — E78.5 DYSLIPIDEMIA: ICD-10-CM

## 2018-09-03 DIAGNOSIS — Z86.73 HISTORY OF TIA (TRANSIENT ISCHEMIC ATTACK): ICD-10-CM

## 2018-09-06 RX ORDER — CLOPIDOGREL BISULFATE 75 MG/1
TABLET ORAL
Qty: 90 TAB | Refills: 2 | Status: SHIPPED | OUTPATIENT
Start: 2018-09-06 | End: 2019-06-02 | Stop reason: SDUPTHER

## 2018-09-06 RX ORDER — SIMVASTATIN 40 MG/1
TABLET, FILM COATED ORAL
Qty: 90 TAB | Refills: 2 | Status: SHIPPED | OUTPATIENT
Start: 2018-09-06 | End: 2019-06-02 | Stop reason: SDUPTHER

## 2018-09-07 ENCOUNTER — CLINICAL SUPPORT (OUTPATIENT)
Dept: FAMILY MEDICINE CLINIC | Age: 63
End: 2018-09-07

## 2018-09-07 DIAGNOSIS — Z23 ENCOUNTER FOR IMMUNIZATION: Primary | ICD-10-CM

## 2018-09-07 NOTE — PROGRESS NOTES
Patient presented to the office today for flu vaccination. Patient is well today. No acute distress or concerns noted at this time. Patient received VIS statement. No questions or concerns verbalized at this time. Patient consents to receiving vaccination. Patient received flu vaccination. Patient tolerated well. General comfort measures in regards to injection site given. Patient verbalized understanding. Patient left ambulatory with no complaints of pain or distress noted at this time.

## 2018-11-08 ENCOUNTER — OFFICE VISIT (OUTPATIENT)
Dept: FAMILY MEDICINE CLINIC | Age: 63
End: 2018-11-08

## 2018-11-08 VITALS
OXYGEN SATURATION: 99 % | RESPIRATION RATE: 19 BRPM | WEIGHT: 190 LBS | TEMPERATURE: 98.1 F | BODY MASS INDEX: 27.2 KG/M2 | HEIGHT: 70 IN | SYSTOLIC BLOOD PRESSURE: 130 MMHG | DIASTOLIC BLOOD PRESSURE: 68 MMHG | HEART RATE: 94 BPM

## 2018-11-08 DIAGNOSIS — Z91.14 NONADHERENCE TO MEDICATION: ICD-10-CM

## 2018-11-08 DIAGNOSIS — B18.2 CHRONIC HEPATITIS C WITHOUT HEPATIC COMA (HCC): ICD-10-CM

## 2018-11-08 DIAGNOSIS — R35.0 BENIGN PROSTATIC HYPERPLASIA WITH URINARY FREQUENCY: Primary | ICD-10-CM

## 2018-11-08 DIAGNOSIS — R14.3 EXCESSIVE FLATUS: ICD-10-CM

## 2018-11-08 DIAGNOSIS — N40.1 BENIGN PROSTATIC HYPERPLASIA WITH URINARY FREQUENCY: Primary | ICD-10-CM

## 2018-11-08 NOTE — PROGRESS NOTES
Chief Complaint   Patient presents with    Benign Prostatic Hypertrophy     Follow Up     1. Have you been to the ER, urgent care clinic since your last visit? Hospitalized since your last visit? No     2. Have you seen or consulted any other health care providers outside of the [address] [place] [address] since your last visit? Include any pap smears or colon screening.   No

## 2018-11-08 NOTE — PATIENT INSTRUCTIONS
Benign Prostatic Hyperplasia: Care Instructions  Your Care Instructions    Benign prostatic hyperplasia, or BPH, is an enlarged prostate gland. The prostate is a small gland that makes some of the fluid in semen. Prostate enlargement happens to almost all men as they age. It is usually not serious. BPH does not cause prostate cancer. As the prostate gets bigger, it may partly block the flow of urine. You may have a hard time getting a urine stream started or completely stopped. BPH can cause dribbling. You may have a weak urine stream, or you may have to urinate more often than you used to, especially at night. Most men find these problems easy to manage. You do not need treatment unless your symptoms bother you a lot or you have other problems, such as bladder infections or stones. In these cases, medicines may help. Surgery is not needed unless the urine flow is blocked or the symptoms do not get better with medicine. Follow-up care is a key part of your treatment and safety. Be sure to make and go to all appointments, and call your doctor if you are having problems. It's also a good idea to know your test results and keep a list of the medicines you take. How can you care for yourself at home? · Take plenty of time to urinate. Try to relax. · Try \"double voiding. \" Urinate as much you can, relax for a few moments, and then try to urinate again. · Sit on the toilet to urinate. · Read or think of other things while you are waiting. · Turn on a faucet, or try to picture running water. Some men find that this helps get their urine flowing. · If dribbling is a problem, wash your penis daily to avoid skin irritation and infection. · Avoid caffeine and alcohol. These drinks will increase how often you need to urinate. Spread your fluid intake throughout the day. If the urge to urinate often wakes you at night, limit your fluid intake in the evening. Urinate right before you go to bed.   · Many over-the-counter cold and allergy medicines can make the symptoms of BPH worse. Avoid antihistamines, decongestants, and allergy pills, if you can. Read the warnings on the package. · If you take any prescription medicines, especially tranquilizers or antidepressants, ask your doctor or pharmacist whether they can cause urination problems. There may be other medicines you can use that do not cause urinary problems. · Be safe with medicines. Take your medicines exactly as prescribed. Call your doctor if you think you are having a problem with your medicine. When should you call for help? Call your doctor now or seek immediate medical care if:    · You cannot urinate at all.     · You have symptoms of a urinary infection. For example:  ? You have blood or pus in your urine. ? You have pain in your back just below your rib cage. This is called flank pain. ? You have a fever, chills, or body aches. ? It hurts to urinate. ? You have groin or belly pain.    Watch closely for changes in your health, and be sure to contact your doctor if:    · It hurts when you ejaculate.     · Your urinary problems get a lot worse or bother you a lot. Where can you learn more? Go to http://jose d-valorie.info/. Enter X272 in the search box to learn more about \"Benign Prostatic Hyperplasia: Care Instructions. \"  Current as of: December 3, 2017  Content Version: 11.8  © 9476-8859 [employer]. Care instructions adapted under license by [employer] (which disclaims liability or warranty for this information). If you have questions about a medical condition or this instruction, always ask your healthcare professional. [address] any warranty or liability for your use of this information. Gas and Bloating: Care Instructions  Your Care Instructions    Gas and bloating can be uncomfortable and embarrassing problems.  All people pass gas, but some people produce more gas than others, sometimes enough to cause distress. It is normal to pass gas from 6 to 20 times per day. Excess gas usually is not caused by a serious health problem. Gas and bloating usually are caused by something you eat or drink, including some food supplements and medicines. Gas and bloating are usually harmless and go away without treatment. However, changing your diet can help end the problem. Some over-the-counter medicines can help prevent gas and relieve bloating. Follow-up care is a key part of your treatment and safety. Be sure to make and go to all appointments, and call your doctor if you are having problems. It's also a good idea to know your test results and keep a list of the medicines you take. How can you care for yourself at home? · Keep a food diary if you think a food gives you gas. Write down what you eat or drink. Also record when you get gas. If you notice that a food seems to cause your gas each time, avoid it and see if the gas goes away. Examples of foods that cause gas include:  ? Fried and fatty foods. ? Beans. ? Vegetables such as artichokes, asparagus, broccoli, brussels sprouts, cabbage, cauliflower, cucumbers, green peppers, onions, peas, radishes, and raw potatoes. ? Fruits such as apricots, bananas, melons, peaches, pears, prunes, and raw apples. ? Wheat and wheat bran. · Soak dry beans in water overnight, then dump the water and cook the soaked beans in new water. This can help prevent gas and bloating. · If you have problems with lactose, avoid dairy products such as milk and cheese. · Try not to swallow air. Do not drink through a straw, gulp your food, or chew gum. · Take an over-the-counter medicine. Read and follow all instructions on the label. ? Food enzymes, such as Beano, can be added to gas-producing foods to prevent gas. ? Antacids, such as Maalox Anti-Gas and Mylanta Gas, can relieve bloating by making you burp.  Be careful when you take over-the-counter antacid medicines. Many of these medicines have aspirin in them. Read the label to make sure that you are not taking more than the recommended dose. Too much aspirin can be harmful. ? Activated charcoal tablets, such as CharcoCaps, may decrease odor from gas you pass. ? If you have problems with lactose, you can take medicines such as Dairy Ease and Lactaid with dairy products to prevent gas and bloating. · Get some exercise regularly. When should you call for help? Call 911 anytime you think you may need emergency care. For example, call if:    · You have gas and signs of a heart attack, such as:  ? Chest pain or pressure. ? Sweating. ? Shortness of breath. ? Nausea or vomiting. ? Pain that spreads from the chest to the neck, jaw, or one or both shoulders or arms. ? Dizziness or lightheadedness. ? A fast or uneven pulse. After calling 911, chew 1 adult-strength aspirin. Wait for an ambulance. Do not try to drive yourself.    Call your doctor now or seek immediate medical care if:    · You have severe belly pain.     · You have blood in your stool.    Watch closely for changes in your health, and be sure to contact your doctor if:    · You have blood or pus in your urine.     · Your urine is cloudy or smells bad.     · You are burping and have trouble swallowing.     · You feel bloated and have swelling in your belly.     · You do not get better as expected. Where can you learn more? Go to http://jose d-valorie.info/. Enter V491 in the search box to learn more about \"Gas and Bloating: Care Instructions. \"  Current as of: November 20, 2017  Content Version: 11.8  © 1563-9445 [employer]. Care instructions adapted under license by [employer] (which disclaims liability or warranty for this information).  If you have questions about a medical condition or this instruction, always ask your healthcare professional. [address] any warranty or liability for your use of this information.

## 2018-11-08 NOTE — PROGRESS NOTES
Subjective:   Tha Castillo is a 60 y.o. male  With BPH. He decided not to use the terazosin. He is reporting he is getting up every two hours and he says \" I'm fine with it because I'm  Retired. \"  Additional complaints of flatus and abdominal distention for several months. He is not sure if he has altered his diet. Has not used any over the counter medications for flatus. GI-Dr. Eneida Beaver for hepatitis C infection follow up    Review of Systems   Constitutional: Negative. Gastrointestinal: Negative for abdominal pain, blood in stool, constipation, diarrhea, heartburn, melena, nausea and vomiting. Genitourinary: Negative for dysuria, flank pain and hematuria. Current Outpatient Medications   Medication Sig Dispense Refill    simvastatin (ZOCOR) 40 mg tablet TAKE 1 TABLET NIGHTLY 90 Tab 2    clopidogrel (PLAVIX) 75 mg tab TAKE 1 TABLET DAILY 90 Tab 2    famotidine (PEPCID) 20 mg tablet Take 1 Tab by mouth two (2) times a day. 60 Tab 3    Triamcinolone Acetonide (NASACORT AQ) 55 mcg/Actuation Aero nasal spray 2 Sprays by Both Nostrils route daily as needed (nasal congestion). 3 Container 3    terazosin (HYTRIN) 1 mg capsule Take 1 Cap by mouth nightly. 30 Cap 2      Diagnoses and all orders for this visit:    Benign prostatic hyperplasia with urinary frequency    Excessive flatus    Nonadherence to medication    Chronic hepatitis C without hepatic coma ([facility] [address] managed by GI Dr. Eneida Beaver    general comfort measures  Yogurt three times per day aNtaliiala Page)  Patient agrees with plan and verbalizes understanding. I have discussed the diagnosis with the patient and the intended plan as seen in the above orders. The patient has received an after-visit summary and questions were answered concerning future plans. I have discussed medication side effects and warnings with the patient as well. Follow-up Disposition:  Return if symptoms worsen or fail to improve, for schedule Wellness examination .       Time with Pt 15 minutes, >50% of which was counseling pt regarding Dx and Tx options excessive flatus  and coordination of care.

## 2018-12-13 ENCOUNTER — OFFICE VISIT (OUTPATIENT)
Dept: FAMILY MEDICINE CLINIC | Age: 63
End: 2018-12-13

## 2018-12-13 VITALS
DIASTOLIC BLOOD PRESSURE: 80 MMHG | OXYGEN SATURATION: 99 % | SYSTOLIC BLOOD PRESSURE: 142 MMHG | WEIGHT: 191.8 LBS | HEIGHT: 70 IN | TEMPERATURE: 98.3 F | BODY MASS INDEX: 27.46 KG/M2 | RESPIRATION RATE: 18 BRPM | HEART RATE: 99 BPM

## 2018-12-13 DIAGNOSIS — Z00.00 WELLNESS EXAMINATION: Primary | ICD-10-CM

## 2018-12-13 NOTE — PATIENT INSTRUCTIONS
Body Mass Index: Care Instructions  Your Care Instructions    Body mass index (BMI) can help you see if your weight is raising your risk for health problems. It uses a formula to compare how much you weigh with how tall you are. · A BMI lower than 18.5 is considered underweight. · A BMI between 18.5 and 24.9 is considered healthy. · A BMI between 25 and 29.9 is considered overweight. A BMI of 30 or higher is considered obese. If your BMI is in the normal range, it means that you have a lower risk for weight-related health problems. If your BMI is in the overweight or obese range, you may be at increased risk for weight-related health problems, such as high blood pressure, heart disease, stroke, arthritis or joint pain, and diabetes. If your BMI is in the underweight range, you may be at increased risk for health problems such as fatigue, lower protection (immunity) against illness, muscle loss, bone loss, hair loss, and hormone problems. BMI is just one measure of your risk for weight-related health problems. You may be at higher risk for health problems if you are not active, you eat an unhealthy diet, or you drink too much alcohol or use tobacco products. Follow-up care is a key part of your treatment and safety. Be sure to make and go to all appointments, and call your doctor if you are having problems. It's also a good idea to know your test results and keep a list of the medicines you take. How can you care for yourself at home? · Practice healthy eating habits. This includes eating plenty of fruits, vegetables, whole grains, lean protein, and low-fat dairy. · If your doctor recommends it, get more exercise. Walking is a good choice. Bit by bit, increase the amount you walk every day. Try for at least 30 minutes on most days of the week. · Do not smoke. Smoking can increase your risk for health problems. If you need help quitting, talk to your doctor about stop-smoking programs and medicines. These can increase your chances of quitting for good. · Limit alcohol to 2 drinks a day for men and 1 drink a day for women. Too much alcohol can cause health problems. If you have a BMI higher than 25  · Your doctor may do other tests to check your risk for weight-related health problems. This may include measuring the distance around your waist. A waist measurement of more than 40 inches in men or 35 inches in women can increase the risk of weight-related health problems. · Talk with your doctor about steps you can take to stay healthy or improve your health. You may need to make lifestyle changes to lose weight and stay healthy, such as changing your diet and getting regular exercise. If you have a BMI lower than 18.5  · Your doctor may do other tests to check your risk for health problems. · Talk with your doctor about steps you can take to stay healthy or improve your health. You may need to make lifestyle changes to gain or maintain weight and stay healthy, such as getting more healthy foods in your diet and doing exercises to build muscle. Where can you learn more? Go to http://jose d-valorie.info/. Enter S176 in the search box to learn more about \"Body Mass Index: Care Instructions. \"  Current as of: June 26, 2018  Content Version: 11.8  © 4814-9857 [employer]. Care instructions adapted under license by [employer] (which disclaims liability or warranty for this information). If you have questions about a medical condition or this instruction, always ask your healthcare professional. [place] [address] any warranty or liability for your use of this information. Well Visit, Men 48 to 72: Care Instructions  Your Care Instructions    Physical exams can help you stay healthy. Your doctor has checked your overall health and may have suggested ways to take good care of yourself. He or she also may have recommended tests.  At home, you can help prevent illness with healthy eating, regular exercise, and other steps. Follow-up care is a key part of your treatment and safety. Be sure to make and go to all appointments, and call your doctor if you are having problems. It's also a good idea to know your test results and keep a list of the medicines you take. How can you care for yourself at home? · Reach and stay at a healthy weight. This will lower your risk for many problems, such as obesity, diabetes, heart disease, and high blood pressure. · Get at least 30 minutes of exercise on most days of the week. Walking is a good choice. You also may want to do other activities, such as running, swimming, cycling, or playing tennis or team sports. · Do not smoke. Smoking can make health problems worse. If you need help quitting, talk to your doctor about stop-smoking programs and medicines. These can increase your chances of quitting for good. · Protect your skin from too much sun. When you're outdoors from 10 a.m. to 4 p.m., stay in the shade or cover up with clothing and a hat with a wide brim. Wear sunglasses that block UV rays. Even when it's cloudy, put broad-spectrum sunscreen (SPF 30 or higher) on any exposed skin. · See a dentist one or two times a year for checkups and to have your teeth cleaned. · Wear a seat belt in the car. · Limit alcohol to 2 drinks a day. Too much alcohol can cause health problems. Follow your doctor's advice about when to have certain tests. These tests can spot problems early. · Cholesterol. Your doctor will tell you how often to have this done based on your overall health and other things that can increase your risk for heart attack and stroke. · Blood pressure. Have your blood pressure checked during a routine doctor visit. Your doctor will tell you how often to check your blood pressure based on your age, your blood pressure results, and other factors.   · Prostate exam. Talk to your doctor about whether you should have a blood test (called a PSA test) for prostate cancer. Experts disagree on whether men should have this test. Some experts recommend that you discuss the benefits and risks of the test with your doctor. · Diabetes. Ask your doctor whether you should have tests for diabetes. · Vision. Some experts recommend that you have yearly exams for glaucoma and other age-related eye problems starting at age 48. · Hearing. Tell your doctor if you notice any change in your hearing. You can have tests to find out how well you hear. · Colon cancer. You should begin tests for colon cancer at age 48. You may have one of several tests. Your doctor will tell you how often to have tests based on your age and risk. Risks include whether you already had a precancerous polyp removed from your colon or whether your parent, brother, sister, or child has had colon cancer. · Heart attack and stroke risk. At least every 4 to 6 years, you should have your risk for heart attack and stroke assessed. Your doctor uses factors such as your age, blood pressure, cholesterol, and whether you smoke or have diabetes to show what your risk for a heart attack or stroke is over the next 10 years. · Abdominal aortic aneurysm. Ask your doctor whether you should have a test to check for an aneurysm. You may need a test if you ever smoked or if your parent, brother, sister, or child has had an aneurysm. When should you call for help? Watch closely for changes in your health, and be sure to contact your doctor if you have any problems or symptoms that concern you. Where can you learn more? Go to http://jose d-valorie.info/. Enter D131 in the search box to learn more about \"Well Visit, Men 48 to 72: Care Instructions. \"  Current as of: March 29, 2018  Content Version: 11.8  © 2866-4216 [employer]. Care instructions adapted under license by [employer] (which disclaims liability or warranty for this information). If you have questions about a medical condition or this instruction, always ask your healthcare professional. [place] [address] any warranty or liability for your use of this information.

## 2018-12-13 NOTE — PROGRESS NOTES
Chief Complaint   Patient presents with    Well Male     1. Have you been to the ER, urgent care clinic since your last visit? Hospitalized since your last visit? No     2. Have you seen or consulted any other health care providers outside of the [address] [place] [address] since your last visit? Include any pap smears or colon screening.  No

## 2018-12-13 NOTE — PROGRESS NOTES
Subjective:   Trang Og is a 58 y.o. male here today for wellness visit    Current Outpatient Medications   Medication Sig Dispense Refill    simvastatin (ZOCOR) 40 mg tablet TAKE 1 TABLET NIGHTLY 90 Tab 2    clopidogrel (PLAVIX) 75 mg tab TAKE 1 TABLET DAILY 90 Tab 2    Triamcinolone Acetonide (NASACORT AQ) 55 mcg/Actuation Aero nasal spray 2 Sprays by Both Nostrils route daily as needed (nasal congestion). 3 Container 3    terazosin (HYTRIN) 1 mg capsule Take 1 Cap by mouth nightly. 30 Cap 2    famotidine (PEPCID) 20 mg tablet Take 1 Tab by mouth two (2) times a day. 60 Tab 3      Patient Active Problem List   Diagnosis Code    GERD (gastroesophageal reflux disease) K21.9    Seasonal allergic rhinitis J30.2    Hemorrhoids, internal K64.8    Polyp of colon K63.5    Enlarged prostate N40.0    Dyslipidemia E78.5    MCKENNA (obstructive sleep apnea) G47.33    History of TIA (transient ischemic attack) Z86.73    Benign prostatic hyperplasia with urinary frequency N40.1, R35.0     Wt Readings from Last 3 Encounters:   12/13/18 191 lb 12.8 oz (87 kg)   11/08/18 190 lb (86.2 kg)   08/07/18 188 lb (85.3 kg)     BP Readings from Last 3 Encounters:   12/13/18 142/80   11/08/18 130/68   08/07/18 132/75     Review of Systems   Constitutional: Negative. HENT: Negative. Eyes: Negative. Respiratory: Negative. Cardiovascular: Negative. Gastrointestinal: Negative. Genitourinary: Negative. Musculoskeletal: Negative. Skin: Negative. Neurological: Negative. Endo/Heme/Allergies: Negative. Psychiatric/Behavioral: Negative. PMH: reviewed medications and allergy lists and medical and family history. OBJECTIVE:  Awake and alert in no acute distress  HEENT:  Head normocephalic atraumatic, Eyes PERRLA, Ears: TMS bilaterally pearly grey, Nares patent without erythema or edema, Pharynx without erythema.   Dentition fair  Neck supple without lymphadenopathy, no carotid artery bruits auscultated bilaterally. Lungs clear throughout  S1 S2 RRR without ectopy or murmur auscultated. Abdomen: normoactive bowel sounds all quadrants, no tenderness to abdomen upper and lower quadrants. No hepatosplenomegaly  Neuro: cranial nerves II-XII tested and intact. No gait abnormalities. Musculoskeletal: normal examination upper and lower extremities head and neck, no warmth to joints, no edema to joints, no gait abnormalities, motor strength +5/5 upper and lower extremities head and neck. DTRs brisk +2 bilaterally. Integumentary: no rashes  No suspicious skin lesions   Normal skin turgor  Visit Vitals  /80 (BP 1 Location: Left arm, BP Patient Position: Sitting)   Pulse 99   Temp 98.3 °F (36.8 °C) (Oral)   Resp 18   Ht 5' 10\" (1.778 m)   Wt 191 lb 12.8 oz (87 kg)   SpO2 99%   BMI 27.52 kg/m²   Diagnoses and all orders for this visit:    Wellness examination    BMI 27.0-27.9,adult    Anticipatory guidance regarding health promotion for this age range and patient verbalizes understanding. I have reviewed/discussed the above normal BMI with the patient. I have recommended the following interventions: dietary management education, guidance, and counseling, encourage exercise, monitor weight and prescribed dietary intake . .    I have discussed the diagnosis with the patient and the intended plan as seen in the above orders. The patient has received an after-visit summary and questions were answered concerning future plans. I have discussed medication side effects and warnings with the patient as well. Follow-up Disposition:  Return in about 4 months (around 4/13/2019) for hld.

## 2019-04-15 ENCOUNTER — HOSPITAL ENCOUNTER (OUTPATIENT)
Dept: LAB | Age: 64
Discharge: HOME | End: 2019-04-15
Payer: COMMERCIAL

## 2019-04-15 ENCOUNTER — OFFICE VISIT (OUTPATIENT)
Dept: FAMILY MEDICINE CLINIC | Age: 64
End: 2019-04-15

## 2019-04-15 VITALS
HEIGHT: 70 IN | OXYGEN SATURATION: 98 % | HEART RATE: 104 BPM | DIASTOLIC BLOOD PRESSURE: 74 MMHG | TEMPERATURE: 98 F | WEIGHT: 188.2 LBS | BODY MASS INDEX: 26.94 KG/M2 | RESPIRATION RATE: 17 BRPM | SYSTOLIC BLOOD PRESSURE: 122 MMHG

## 2019-04-15 DIAGNOSIS — K21.9 GASTROESOPHAGEAL REFLUX DISEASE, ESOPHAGITIS PRESENCE NOT SPECIFIED: ICD-10-CM

## 2019-04-15 DIAGNOSIS — J22 LOWER RESPIRATORY INFECTION: ICD-10-CM

## 2019-04-15 DIAGNOSIS — E78.49 OTHER HYPERLIPIDEMIA: ICD-10-CM

## 2019-04-15 DIAGNOSIS — E78.49 OTHER HYPERLIPIDEMIA: Primary | ICD-10-CM

## 2019-04-15 LAB
ALBUMIN SERPL-MCNC: 3.8 G/DL (ref 3.4–5)
ALBUMIN/GLOB SERPL: 1.2 {RATIO} (ref 0.8–1.7)
ALP SERPL-CCNC: 83 U/L (ref 45–117)
ALT SERPL-CCNC: 35 U/L (ref 16–61)
ANION GAP SERPL CALC-SCNC: 8 MMOL/L (ref 3–18)
AST SERPL-CCNC: 31 U/L (ref 15–37)
BILIRUB SERPL-MCNC: 0.6 MG/DL (ref 0.2–1)
BUN SERPL-MCNC: 11 MG/DL (ref 7–18)
BUN/CREAT SERPL: 11 (ref 12–20)
CALCIUM SERPL-MCNC: 9.3 MG/DL (ref 8.5–10.1)
CHLORIDE SERPL-SCNC: 105 MMOL/L (ref 100–108)
CHOLEST SERPL-MCNC: 156 MG/DL
CO2 SERPL-SCNC: 27 MMOL/L (ref 21–32)
CREAT SERPL-MCNC: 1.04 MG/DL (ref 0.6–1.3)
ERYTHROCYTE [DISTWIDTH] IN BLOOD BY AUTOMATED COUNT: 13.3 % (ref 11.6–14.5)
GLOBULIN SER CALC-MCNC: 3.3 G/DL (ref 2–4)
GLUCOSE SERPL-MCNC: 91 MG/DL (ref 74–99)
HCT VFR BLD AUTO: 49.6 % (ref 36–48)
HDLC SERPL-MCNC: 82 MG/DL (ref 40–60)
HDLC SERPL: 1.9 {RATIO} (ref 0–5)
HGB BLD-MCNC: 16.1 G/DL (ref 13–16)
LDLC SERPL CALC-MCNC: 53.2 MG/DL (ref 0–100)
LIPID PROFILE,FLP: ABNORMAL
MCH RBC QN AUTO: 32.2 PG (ref 24–34)
MCHC RBC AUTO-ENTMCNC: 32.5 G/DL (ref 31–37)
MCV RBC AUTO: 99.2 FL (ref 74–97)
PLATELET # BLD AUTO: 267 K/UL (ref 135–420)
PMV BLD AUTO: 10.3 FL (ref 9.2–11.8)
POTASSIUM SERPL-SCNC: 4.7 MMOL/L (ref 3.5–5.5)
PROT SERPL-MCNC: 7.1 G/DL (ref 6.4–8.2)
RBC # BLD AUTO: 5 M/UL (ref 4.7–5.5)
SODIUM SERPL-SCNC: 140 MMOL/L (ref 136–145)
TRIGL SERPL-MCNC: 104 MG/DL (ref ?–150)
VLDLC SERPL CALC-MCNC: 20.8 MG/DL
WBC # BLD AUTO: 9 K/UL (ref 4.6–13.2)

## 2019-04-15 PROCEDURE — 85027 COMPLETE CBC AUTOMATED: CPT

## 2019-04-15 PROCEDURE — 36415 COLL VENOUS BLD VENIPUNCTURE: CPT

## 2019-04-15 PROCEDURE — 80053 COMPREHEN METABOLIC PANEL: CPT

## 2019-04-15 PROCEDURE — 80061 LIPID PANEL: CPT

## 2019-04-15 RX ORDER — AZITHROMYCIN 250 MG/1
TABLET, FILM COATED ORAL
Qty: 6 TAB | Refills: 0 | Status: SHIPPED | OUTPATIENT
Start: 2019-04-15 | End: 2019-04-20

## 2019-04-15 NOTE — PROGRESS NOTES
Chief Complaint   Patient presents with    Cholesterol Problem     1. Have you been to the ER, urgent care clinic since your last visit? Hospitalized since your last visit? No     2. Have you seen or consulted any other health care providers outside of the [address] [place] [address] since your last visit? Include any pap smears or colon screening.  No

## 2019-04-15 NOTE — PROGRESS NOTES
Subjective:   Ethan Lu is a 58 y.o. male with HLD, history of TIA, BPH, GERD  Reports doing well taking all prescribed [facility] maintenance reviewed    Current Outpatient Medications   Medication Sig Dispense Refill    simvastatin (ZOCOR) 40 mg tablet TAKE 1 TABLET NIGHTLY 90 Tab 2    clopidogrel (PLAVIX) 75 mg tab TAKE 1 TABLET DAILY 90 Tab 2    famotidine (PEPCID) 20 mg tablet Take 1 Tab by mouth two (2) times a day. 60 Tab 3    Triamcinolone Acetonide (NASACORT AQ) 55 mcg/Actuation Aero nasal spray 2 Sprays by Both Nostrils route daily as needed (nasal congestion). 3 Container 3    terazosin (HYTRIN) 1 mg capsule Take 1 Cap by mouth nightly. 30 Cap 2      ROS: taking medications as instructed, no medication side effects noted, no TIA's, no chest pain on exertion, no dyspnea on exertion, +coughing intermittently for the past week since pollen counts so high per patient, no nasal congestion, no rhinorrhea or complaints of headaches or upper respiratory symptoms no swelling of ankles. Wt Readings from Last 3 Encounters:   04/15/19 188 lb 3.2 oz (85.4 kg)   12/13/18 191 lb 12.8 oz (87 kg)   11/08/18 190 lb (86.2 kg)     BP Readings from Last 3 Encounters:   04/15/19 122/74   12/13/18 142/80   11/08/18 130/68     Lab Results   Component Value Date/Time    Cholesterol, total 144 04/04/2018 09:55 AM    HDL Cholesterol 80 (H) 04/04/2018 09:55 AM    LDL, calculated 50.6 04/04/2018 09:55 AM    VLDL, calculated 13.4 04/04/2018 09:55 AM    Triglyceride 67 04/04/2018 09:55 AM    CHOL/HDL Ratio 1.8 04/04/2018 09:55 AM     OBJECTIVE:  Awake and alert in no acute distress  Lungs clear anterior auscultated rhonchi right lower lobe clears partially with coughing no labored respirations   S1 S2 RRR without ectopy or murmur auscultated.   Extremities: no clubbing, cyanosis, peripheral edema  Visit Vitals  /74 (BP 1 Location: Left arm, BP Patient Position: Sitting)   Pulse (!) 104   Temp 98 °F (36.7 °C) (Oral)   Resp 17   Ht 5' 10\" (1.778 m)   Wt 188 lb 3.2 oz (85.4 kg)   SpO2 98%   BMI 27.00 kg/m²     Diagnoses and all orders for this visit:    Other hyperlipidemia  -     LIPID PANEL; Future    Gastroesophageal reflux disease, esophagitis presence not specified  -     METABOLIC PANEL, COMPREHENSIVE; Future  -     CBC W/O DIFF; Future    Lower respiratory infection  -     azithromycin (ZITHROMAX) 250 mg tablet; Take 2 tablets today, then take 1 tablet daily, Normal, Disp-6 Tab, R-0    Reviewed risks and benefits and common side effects of new medication  General comfort measures  Patient agrees with plan and verbalizes understanding. Health maintenance reviewed  I have discussed the diagnosis with the patient and the intended plan as seen in the above orders. The patient has received an after-visit summary and questions were answered concerning future plans. I have discussed medication side effects and warnings with the patient as well. Follow-up and Dispositions    · Return in about 4 months (around 8/15/2019), or if symptoms worsen or fail to improve, for hld .

## 2019-06-02 DIAGNOSIS — Z86.73 HISTORY OF TIA (TRANSIENT ISCHEMIC ATTACK): ICD-10-CM

## 2019-06-02 DIAGNOSIS — E78.5 DYSLIPIDEMIA: ICD-10-CM

## 2019-06-03 RX ORDER — CLOPIDOGREL BISULFATE 75 MG/1
TABLET ORAL
Qty: 90 TAB | Refills: 2 | Status: SHIPPED | OUTPATIENT
Start: 2019-06-03 | End: 2020-02-28

## 2019-06-03 RX ORDER — SIMVASTATIN 40 MG/1
TABLET, FILM COATED ORAL
Qty: 90 TAB | Refills: 2 | Status: SHIPPED | OUTPATIENT
Start: 2019-06-03 | End: 2020-02-28

## 2019-06-13 ENCOUNTER — OFFICE VISIT (OUTPATIENT)
Dept: FAMILY MEDICINE CLINIC | Age: 64
End: 2019-06-13

## 2019-06-13 VITALS
BODY MASS INDEX: 26.63 KG/M2 | TEMPERATURE: 98.8 F | SYSTOLIC BLOOD PRESSURE: 138 MMHG | RESPIRATION RATE: 16 BRPM | WEIGHT: 186 LBS | HEIGHT: 70 IN | HEART RATE: 100 BPM | OXYGEN SATURATION: 96 % | DIASTOLIC BLOOD PRESSURE: 80 MMHG

## 2019-06-13 DIAGNOSIS — M25.562 ACUTE PAIN OF LEFT KNEE: Primary | ICD-10-CM

## 2019-06-13 RX ORDER — NABUMETONE 750 MG/1
TABLET, FILM COATED ORAL
Qty: 20 TAB | Refills: 0 | Status: SHIPPED | OUTPATIENT
Start: 2019-06-13 | End: 2019-06-19

## 2019-06-13 NOTE — PROGRESS NOTES
Chief Complaint   Patient presents with    Knee Swelling       Pt preferred language for health care discussion is english. Is someone accompanying this pt? no    Is the patient using any DME equipment during [facility]? no    Depression Screening:  3 most recent PHQ Screens 4/15/2019 12/13/2018 8/7/2018 8/7/2018 10/13/2017 9/12/2014   Little interest or pleasure in doing things Not at all Not at all Not at all Not at all Not at all Several days   Feeling down, depressed, irritable, or hopeless Not at all Not at all - Not at all Not at all Not at all   Total Score PHQ 2 0 0 - 0 0 1   Trouble falling or staying asleep, or sleeping too much - - - - - More than half the days   Feeling tired or having little energy - - - - - Several days   Poor appetite, weight loss, or overeating - - - - - Not at all   Feeling bad about yourself - or that you are a failure or have let yourself or your family down - - - - - More than half the days   Trouble concentrating on things such as school, work, reading, or watching TV - - - - - Not at all   Moving or speaking so slowly that other people could have noticed; or the opposite being so fidgety that others notice - - - - - Not at all   Thoughts of being better off dead, or hurting yourself in some way - - - - - Not at all   PHQ 9 Score - - - - - 6   How difficult have these problems made it for you to do your work, take care of your home and get along with others - - - - - Not difficult at all       Learning Assessment:  Learning Assessment 6/26/2015 8/15/2013   PRIMARY LEARNER Patient Patient   PRIMARY LANGUAGE ENGLISH ENGLISH   LEARNER PREFERENCE PRIMARY LISTENING READING     READING LISTENING     DEMONSTRATION DEMONSTRATION   ANSWERED BY patient patient   RELATIONSHIP SELF SELF         Health Maintenance reviewed and discussed per provider. Yes        Advance Directive:  1. Do you have an advance directive in place? Patient Reply:no    2.  If not, would you like material regarding how to put one in place? Patient Reply: no    Coordination of Care:  1. Have you been to the ER, urgent care clinic since your last visit? Hospitalized since your last visit? Yes patient first knee pain    2. Have you seen or consulted any other health care providers outside of the [address] [place] [address] since your last visit? Include any pap smears or colon screening.  no    Provider prefers to do his own med reconciliation

## 2019-06-13 NOTE — PROGRESS NOTES
HPI:   (L) knee pain and swelling x 5 days; no obvious injury; no instability      ROS is otherwise negative.     Past Medical History:   Diagnosis Date    Dyslipidemia 2/15/2012    GERD (gastroesophageal reflux disease)     Hiatal hernia     IBS (irritable bowel syndrome)     mild     MCKENNA (obstructive sleep apnea) 2/15/2012    does not use cpap machine, oral device only    Seasonal allergies     Transient ischemic attack 2/15/2012       Past Surgical History:   Procedure Laterality Date    HX GI  2000's    Fistulatomy    HX TONSILLECTOMY  30 years ago       Social History     Socioeconomic History    Marital status:      Spouse name: Not on file    Number of children: Not on file    Years of education: Not on file    Highest education level: Not on file   Occupational History    Not on file   Social Needs    Financial resource strain: Not on file    Food insecurity:     Worry: Not on file     Inability: Not on file    Transportation needs:     Medical: Not on file     Non-medical: Not on file   Tobacco Use    Smoking status: Former Smoker     Packs/day: 1.00     Years: 30.00     Pack years: 30.00     Last attempt to quit: 2012     Years since quittin.5    Smokeless tobacco: Former User    Tobacco comment: quit smoking in , e-ciggerate (vapor)   Substance and Sexual Activity    Alcohol use: Yes     Comment: 12 pack per week    Drug use: No    Sexual activity: Yes     Partners: Female   Lifestyle    Physical activity:     Days per week: Not on file     Minutes per session: Not on file    Stress: Not on file   Relationships    Social connections:     Talks on phone: Not on file     Gets together: Not on file     Attends Yazdanism service: Not on file     Active member of club or organization: Not on file     Attends meetings of clubs or organizations: Not on file     Relationship status: Not on file    Intimate partner violence:     Fear of current or ex partner: Not on file     Emotionally abused: Not on file     Physically abused: Not on file     Forced sexual activity: Not on file   Other Topics Concern    Not on file   Social History Narrative    Not on file       Allergies   Allergen Reactions    Bactrim [Sulfamethoprim Ds] Rash    Nexium [Esomeprazole Magnesium] Other (comments)     Anxiety       History reviewed. No pertinent family history. Current Outpatient Medications   Medication Sig Dispense Refill    simvastatin (ZOCOR) 40 mg tablet TAKE 1 TABLET NIGHTLY 90 Tab 2    clopidogrel (PLAVIX) 75 mg tab TAKE 1 TABLET DAILY 90 Tab 2    famotidine (PEPCID) 20 mg tablet Take 1 Tab by mouth two (2) times a day. 60 Tab 3    Triamcinolone Acetonide (NASACORT AQ) 55 mcg/Actuation Aero nasal spray 2 Sprays by Both Nostrils route daily as needed (nasal congestion). 3 Container 3           Visit Vitals  /80 (BP 1 Location: Right arm, BP Patient Position: Sitting)   Pulse 100   Temp 98.8 °F (37.1 °C) (Tympanic)   Resp 16   Ht 5' 10\" (1.778 m)   Wt 186 lb (84.4 kg)   SpO2 96%   BMI 26.69 kg/m²       PE  Well nourished in NAD  HEENT:   OP: clear. Neck: supple w/o mass   Chest: clear. CV: RRR w/o m,r,g  Abd: soft, NT, w/o HSM or mass. Ext: w/o edema. (L) knee: medial swelling/tenderness; FROM with mild pain    Assessment and Plan    Encounter Diagnoses   Name Primary?     Acute pain of left knee Yes       (L) knee pain: relafen 1500 mg every day with food for 10 days -?meniscal teat  To ortho for eval  OV 3 mos or prn  I have explained plan to patient and the patient verbalizes understanding

## 2019-06-13 NOTE — PATIENT INSTRUCTIONS
Knee Pain or Injury: Care Instructions  Your Care Instructions    Injuries are a common cause of knee problems. Sudden (acute) injuries may be caused by a direct blow to the knee. They can also be caused by abnormal twisting, bending, or falling on the knee. Pain, bruising, or swelling may be severe, and may start within minutes of the injury. Overuse is another cause of knee pain. Other causes are climbing stairs, kneeling, and other activities that use the knee. Everyday wear and tear, especially as you get older, also can cause knee pain. Rest, along with home treatment, often relieves pain and allows your knee to heal. If you have a serious knee injury, you may need tests and treatment. Follow-up care is a key part of your treatment and safety. Be sure to make and go to all appointments, and call your doctor if you are having problems. It's also a good idea to know your test results and keep a list of the medicines you take. How can you care for yourself at home? · Be safe with medicines. Read and follow all instructions on the label. ? If the doctor gave you a prescription medicine for pain, take it as prescribed. ? If you are not taking a prescription pain medicine, ask your doctor if you can take an over-the-counter medicine. · Rest and protect your knee. Take a break from any activity that may cause pain. · Put ice or a cold pack on your knee for 10 to 20 minutes at a time. Put a thin cloth between the ice and your skin. · Prop up a sore knee on a pillow when you ice it or anytime you sit or lie down for the next 3 days. Try to keep it above the level of your heart. This will help reduce swelling. · If your knee is not swollen, you can put moist heat, a heating pad, or a warm cloth on your knee. · If your doctor recommends an elastic bandage, sleeve, or other type of support for your knee, wear it as directed.   · Follow your doctor's instructions about how much weight you can put on your leg. Use a cane, crutches, or a walker as instructed. · Follow your doctor's instructions about activity during your healing process. If you can do mild exercise, slowly increase your activity. · Reach and stay at a healthy weight. Extra weight can strain the joints, especially the knees and hips, and make the pain worse. Losing even a few pounds may help. When should you call for help? Call 911 anytime you think you may need emergency care. For example, call if:    · You have symptoms of a blood clot in your lung (called a pulmonary embolism). These may include:  ? Sudden chest pain. ? Trouble breathing. ? Coughing up blood.    Call your doctor now or seek immediate medical care if:    · You have severe or increasing pain.     · Your leg or foot turns cold or changes color.     · You cannot stand or put weight on your knee.     · Your knee looks twisted or bent out of shape.     · You cannot move your knee.     · You have signs of infection, such as:  ? Increased pain, swelling, warmth, or redness. ? Red streaks leading from the knee. ? Pus draining from a place on your knee. ? A fever.     · You have signs of a blood clot in your leg (called a deep vein thrombosis), such as:  ? Pain in your calf, back of the knee, thigh, or groin. ? Redness and swelling in your leg or groin.    Watch closely for changes in your health, and be sure to contact your doctor if:    · You have tingling, weakness, or numbness in your knee.     · You have any new symptoms, such as swelling.     · You have bruises from a knee injury that last longer than 2 weeks.     · You do not get better as expected. Where can you learn more? Go to http://jose d-valorie.info/. Enter K195 in the search box to learn more about \"Knee Pain or Injury: Care Instructions. \"  Current as of: September 23, 2018  Content Version: 11.9  © 1604-9690 [employer].  Care instructions adapted under license by [employer] (which disclaims liability or warranty for this information). If you have questions about a medical condition or this instruction, always ask your healthcare professional. [address] any warranty or liability for your use of this information.

## 2019-06-19 ENCOUNTER — OFFICE VISIT (OUTPATIENT)
Dept: ORTHOPEDIC SURGERY | Facility: CLINIC | Age: 64
End: 2019-06-19

## 2019-06-19 VITALS
RESPIRATION RATE: 16 BRPM | HEART RATE: 100 BPM | SYSTOLIC BLOOD PRESSURE: 144 MMHG | DIASTOLIC BLOOD PRESSURE: 83 MMHG | WEIGHT: 187.2 LBS | HEIGHT: 70 IN | OXYGEN SATURATION: 95 % | TEMPERATURE: 97.5 F | BODY MASS INDEX: 26.8 KG/M2

## 2019-06-19 DIAGNOSIS — M25.462 PAIN AND SWELLING OF LEFT KNEE: Primary | ICD-10-CM

## 2019-06-19 DIAGNOSIS — M65.9 SYNOVITIS OF LEFT KNEE: ICD-10-CM

## 2019-06-19 DIAGNOSIS — M25.662 DECREASED RANGE OF MOTION OF LEFT KNEE: ICD-10-CM

## 2019-06-19 DIAGNOSIS — M25.562 PAIN AND SWELLING OF LEFT KNEE: Primary | ICD-10-CM

## 2019-06-19 DIAGNOSIS — M25.40 EFFUSION INTO JOINT: ICD-10-CM

## 2019-06-19 RX ORDER — NABUMETONE 750 MG/1
750 TABLET, FILM COATED ORAL 2 TIMES DAILY
Qty: 60 TAB | Refills: 1 | Status: SHIPPED | OUTPATIENT
Start: 2019-06-19 | End: 2019-07-05 | Stop reason: SINTOL

## 2019-06-19 NOTE — PROGRESS NOTES
HISTORY:  The patient is a pleasant, 78-year-old, generally fit,  male who presents to the office with a 2 week history of left knee pain. He saw his PCP, Dr. Kwadwo Dominguez, within the past 2 weeks and was referred to our office due to his discomfort. He was placed on Relafen 750 mg, 2 tablets a day for his symptoms. The patient reports no trauma to the knee. He cannot remember any activities that he overdid. There were no falls reported. He just woke up one night about 2 weeks ago and he had severe pain to the left knee which he rated as a 6-7 on a 10-point scale, and with activity, to include short distance walking and standing. Following the painful onset, the pain increased upwards of a 10 on a 10-point scale. A couple of weeks ago his pain has gradually subsided. It is still present when he stands for short periods of time and walks short distances. He has continued his Relafen. REVIEW OF SYSTEMS:  He has no chest pain, no shortness of breath. He is on Plavix for atrial fibrillation. He has no fever, chills, or night sweats today. He has had no problems with Relafen via stomach upset, but does take a PPI for GERD on a regular basis. No stroke history. No heart attack history. The patient's pain today at rest, associated with his left knee, is 2-3 on a 10-point scale with activity, increasing to 4-5 on a 10-point scale. ALLERGIES:  He is allergic to Bactrim and Nexium. PHYSICAL EXAMINATION:  He is a heathy-appearing, well-developed, well-nourished, pleasant, generally fit, 78-year-old,  male. Atraumatic, normocephalic. Alert and oriented x3, sitting on the table comfortably. He is in shorts. He lays supine without difficulties. Examination of the left knee reveals no warmth, erythema, edema, or ecchymosis. He has a 1+ effusion. While supine, he can actively flex to 105 degrees with pain associated to the medial joint space.   There is also no instability on varus and valgus stressing. The MCL and the LCL are both intact. Medial stressing, however, does cause medial joint line discomfort, most posterior than interior. There is no popliteal fullness nor tenderness. The patella does track midline through ranging with crepitation noted. He has a negative Lachman's sign and a negative Anthony's sign. No calf tenderness or evidence of DVT. Distal sensation is intact fully to the left lower extremity. Capillary refill is brisk and less than 2 seconds in the left lower extremity. RADIOGRAPHS:   X-rays, 3-view tunnel, AP, and lateral of the left knee today reveal early medial joint space narrowing as well as early patellofemoral osteoarthritis. IMPRESSION:  1. Left knee pain, acute, x2 weeks, resolving slowly with Relafen. 2. Left knee effusion. 3. Decreased range of motion of the left knee. 4. Left knee synovitis. 5. History of Plavix usage. The patient cautioned with use of non-steroidal anti-inflammatory medications. He is asymptomatic with emesis free of blood and no tarry stools. RECOMMENDATIONS:  I am recommending a low-dose cortisone injection today, but I would like to attempt an aspiration first.      PROCEDURE:  Today, while the patient was supine, with the left knee flexed at 20-degrees on a bolster, 3 mL of 1% Lidocaine used to anesthetize the superolateral intraarticular approach; to follow 4 mL of straw-colored aspirate removed from the same portal; to follow 1 mL of Kenalog at 40 mg/mL mixed with 7 mL of Sensorcaine 0.75% injected. The patient tolerated the procedure well. PLAN:  The patient will follow back on a p.r.n. basis. We did discuss the use of hyaluronic acid injections. He was given a [employer] brochure. Today all of his questions were answered to his satisfaction. A copy of his x-rays reviewed and provided.           Be sure to indicate that the [employer] Ultrasound machine was utilized for image guided assistance in the patient's care. Image was captured and placed on chart.

## 2019-07-05 ENCOUNTER — OFFICE VISIT (OUTPATIENT)
Dept: FAMILY MEDICINE CLINIC | Age: 64
End: 2019-07-05

## 2019-07-05 VITALS
RESPIRATION RATE: 16 BRPM | HEIGHT: 70 IN | TEMPERATURE: 98.1 F | HEART RATE: 105 BPM | WEIGHT: 184 LBS | SYSTOLIC BLOOD PRESSURE: 130 MMHG | DIASTOLIC BLOOD PRESSURE: 80 MMHG | OXYGEN SATURATION: 97 % | BODY MASS INDEX: 26.34 KG/M2

## 2019-07-05 DIAGNOSIS — T50.905A ADVERSE EFFECT OF DRUG, INITIAL ENCOUNTER: ICD-10-CM

## 2019-07-05 DIAGNOSIS — L27.0 DRUG EXANTHEM: Primary | ICD-10-CM

## 2019-07-05 RX ORDER — PREDNISONE 10 MG/1
TABLET ORAL
Qty: 21 TAB | Refills: 0 | Status: SHIPPED | OUTPATIENT
Start: 2019-07-05 | End: 2019-08-15 | Stop reason: ALTCHOICE

## 2019-07-05 NOTE — PATIENT INSTRUCTIONS
Drug Allergy: Care Instructions  Your Care Instructions    A drug allergy occurs when your immune system overreacts to something in a medicine. This causes an allergic reaction. You may have skin problems, such as hives or a rash. Your lips, mouth, and throat may swell. You may have trouble breathing. And you may have belly pain, nausea, vomiting, or diarrhea. A reaction can range from mild to life-threatening. After you have an allergic reaction to a medicine, you may always be allergic to that medicine and to others like it. Drug allergies are different than side effects and drug interactions. Side effects are bad reactions to a medicine. Drug interactions occur when two or more medicines that you take do not get along in your body. Some people may confuse these with drug allergies. Follow-up care is a key part of your treatment and safety. Be sure to make and go to all appointments, and call your doctor if you are having problems. It's also a good idea to know your test results and keep a list of the medicines you take. How can you care for yourself at home? · Your doctor may prescribe a shot of epinephrine to carry with you in case you have a severe reaction. Learn how to give yourself the shot, and keep it with you at all times. Make sure it has not . · Go to the emergency room every time you have a severe reaction. Go even if you have used your shot of epinephrine and are feeling better. Symptoms can come back after a shot. · Be safe with medicines. If you were given a medicine for your allergic reaction, take it exactly as directed. Call your doctor if you think you are having a problem with your medicine. · Avoid medicines like the one that caused your allergy. Ask your doctor or pharmacist if you think you may be taking a similar medicine. · If you have a mild skin rash or itching from your allergy:  ? Wear light clothing that does not irritate your skin. ? Use calamine lotion.  Or take an over-the-counter antihistamine, such as diphenhydramine (Benadryl) or loratadine (Claritin). Read and follow the instructions on the label. ? Take a cool shower or bath. ? Do not use strong soaps, detergents, and other chemicals. They can make itching worse. · Wear medical alert jewelry that lists your allergies. You can buy this at most drugstores. · Be sure that anyone treating you for any health problem knows that you are allergic to this medicine. When should you call for help? Give an epinephrine shot if:    · You think you are having a severe allergic reaction.    After giving an epinephrine shot call 911, even if you feel better.   Call 911 if:    · You have symptoms of a severe allergic reaction. These may include:  ? Sudden raised, red areas (hives) all over your body. ? Swelling of the throat, mouth, lips, or tongue. ? Trouble breathing. ? Passing out (losing consciousness). Or you may feel very lightheaded or suddenly feel weak, confused, or restless.     · You have been given an epinephrine shot, even if you feel better.    Call your doctor now or seek immediate medical care if:    · You have symptoms of an allergic reaction, such as:  ? A rash or hives (raised, red areas on the skin). ? Itching. ? Swelling. ? Belly pain, nausea, or vomiting.    Watch closely for changes in your health, and be sure to contact your doctor if:    · You do not get better as expected. Where can you learn more? Go to http://jose d-valorie.info/. Enter T963 in the search box to learn more about \"Drug Allergy: Care Instructions. \"  Current as of: June 27, 2018  Content Version: 11.9  © 9050-5076 [employer]. Care instructions adapted under license by [employer] (which disclaims liability or warranty for this information).  If you have questions about a medical condition or this instruction, always ask your healthcare professional. Zohaib Brown any warranty or liability for your use of this information.

## 2019-07-05 NOTE — PROGRESS NOTES
Subjective:   Karsten Harrison is a 58 y.o. male with medication reaction to nambumetone. Prescribed by another provider for left knee pain. He reports he took for 10 days and was referred to orthopedics for left knee pain and effusion. He was advised to continue prn for pain and he had the prescription refilled and last Friday after taking the medication he developed an erythematous rash on anterior and posterior chest and under his axillary region        Current Outpatient Medications   Medication Sig Dispense Refill    nabumetone (RELAFEN) 750 mg tablet Take 1 Tab by mouth two (2) times a day. Indications: Joint Damage causing Pain and Loss of Function 60 Tab 1    simvastatin (ZOCOR) 40 mg tablet TAKE 1 TABLET NIGHTLY 90 Tab 2    clopidogrel (PLAVIX) 75 mg tab TAKE 1 TABLET DAILY 90 Tab 2    famotidine (PEPCID) 20 mg tablet Take 1 Tab by mouth two (2) times a day. 60 Tab 3    Triamcinolone Acetonide (NASACORT AQ) 55 mcg/Actuation Aero nasal spray 2 Sprays by Both Nostrils route daily as needed (nasal congestion). 3 Container 3      Review of Systems   Constitutional: Negative for chills and fever. Musculoskeletal: Negative for joint pain. Denies left knee pain today    Skin: Positive for itching. OBJECTIVE:  Awake and alert in no acute distress  Lungs clear throughout  S1 S2 RRR without ectopy or murmur auscultated. Integumentary: maculopapular rash visible posterior thorax upper aspects, axillary bilaterally  Scattered on anterior chest and upper arms greater on right than left.   Normal skin turgor  Visit Vitals  /80 (BP 1 Location: Left arm, BP Patient Position: Sitting)   Pulse (!) 105   Temp 98.1 °F (36.7 °C) (Oral)   Resp 16   Ht 5' 10\" (1.778 m)   Wt 184 lb (83.5 kg)   SpO2 97%   BMI 26.40 kg/m²   Diagnoses and all orders for this visit:    Drug exanthem  -     predniSONE (STERAPRED DS) 10 mg dose pack; See administration instruction per 10mg dose pack, Normal, Disp-21 Tab, R-0    Adverse effect of drug, initial encounter  -     predniSONE (STERAPRED DS) 10 mg dose pack; See administration instruction per 10mg dose pack, Normal, Disp-21 Tab, R-0      Stop nabumetone. Patient agrees with plan and verbalizes understanding. Reviewed risks and benefits and common side effects of new medication  May take over the counter benadryl prn itching.  (follow over the counter package dosing instructions.)  Patient agrees with plan and verbalizes understanding. I have discussed the diagnosis with the patient and the intended plan as seen in the above orders. The patient has received an after-visit summary and questions were answered concerning future plans. I have discussed medication side effects and warnings with the patient as well. Follow-up and Dispositions    · Return if symptoms worsen or fail to improve.

## 2019-07-05 NOTE — PROGRESS NOTES
Duarte Marshall presents today for   Chief Complaint   Patient presents with    Rash     arms, neck and back       Is someone accompanying this pt? no    Is the patient using any DME equipment during [address]? no    Depression Screening:  3 most recent PHQ Screens 6/19/2019   Little interest or pleasure in doing things Not at all   Feeling down, depressed, irritable, or hopeless Not at all   Total Score PHQ 2 0   Trouble falling or staying asleep, or sleeping too much -   Feeling tired or having little energy -   Poor appetite, weight loss, or overeating -   Feeling bad about yourself - or that you are a failure or have let yourself or your family down -   Trouble concentrating on things such as school, work, reading, or watching TV -   Moving or speaking so slowly that other people could have noticed; or the opposite being so fidgety that others notice -   Thoughts of being better off dead, or hurting yourself in some way -   PHQ 9 Score -   How difficult have these problems made it for you to do your work, take care of your home and get along with others -       Learning Assessment:  Learning Assessment 6/26/2015   PRIMARY LEARNER Patient   PRIMARY LANGUAGE ENGLISH   LEARNER PREFERENCE PRIMARY LISTENING     READING     DEMONSTRATION   ANSWERED BY patient   RELATIONSHIP SELF       Abuse Screening:  Abuse Screening Questionnaire 8/7/2018   Do you ever feel afraid of your partner? N   Are you in a relationship with someone who physically or mentally threatens you? N   Is it safe for you to go home? Y       Fall Risk  No flowsheet data found. Health Maintenance reviewed and discussed and ordered per Provider. Health Maintenance Due   Topic Date Due    Pneumococcal 0-64 years (1 of 1 - PPSV23) 11/23/1961    Shingrix Vaccine Age 50> (1 of 2) 11/23/2004   . Coordination of Care:  1. Have you been to the ER, urgent care clinic since your last visit? Hospitalized since your last visit? no    2.  Have you seen or consulted any other health care providers outside of the [address] [place] [address] since your last visit? Include any pap smears or colon screening.  no

## 2019-08-15 ENCOUNTER — OFFICE VISIT (OUTPATIENT)
Dept: FAMILY MEDICINE CLINIC | Age: 64
End: 2019-08-15

## 2019-08-15 VITALS
HEIGHT: 70 IN | WEIGHT: 186 LBS | HEART RATE: 94 BPM | TEMPERATURE: 98.1 F | RESPIRATION RATE: 16 BRPM | OXYGEN SATURATION: 99 % | DIASTOLIC BLOOD PRESSURE: 74 MMHG | BODY MASS INDEX: 26.63 KG/M2 | SYSTOLIC BLOOD PRESSURE: 132 MMHG

## 2019-08-15 DIAGNOSIS — K21.9 GASTROESOPHAGEAL REFLUX DISEASE, ESOPHAGITIS PRESENCE NOT SPECIFIED: ICD-10-CM

## 2019-08-15 DIAGNOSIS — E78.5 DYSLIPIDEMIA: Primary | ICD-10-CM

## 2019-08-15 DIAGNOSIS — R35.0 BENIGN PROSTATIC HYPERPLASIA WITH URINARY FREQUENCY: ICD-10-CM

## 2019-08-15 DIAGNOSIS — N40.1 BENIGN PROSTATIC HYPERPLASIA WITH URINARY FREQUENCY: ICD-10-CM

## 2019-08-15 DIAGNOSIS — G47.33 OSA (OBSTRUCTIVE SLEEP APNEA): ICD-10-CM

## 2019-08-15 NOTE — PROGRESS NOTES
Chief Complaint   Patient presents with    Cholesterol Problem     1. Have you been to the ER, urgent care clinic since your last visit? Hospitalized since your last visit? No    2. Have you seen or consulted any other health care providers outside of the [address] [place] [address] since your last visit? Include any pap smears or colon screening.  No

## 2019-08-15 NOTE — PROGRESS NOTES
Subjective:   Jonathon Cushing is a 58 y.o. male with HLD, history of TIA, MCKENNA but does not use CPAP BPH stable (patient not taking terazosin)  He again reports that he is retired and can get up twice nightly to urinate   Colonic polyp, GERD managed by Dr. Kraig Carlin last labs 4-3-2019 HCV not detected    Repeat colonoscopy 2022 Patient agrees with plan and verbalizes understanding. Current Outpatient Medications   Medication Sig Dispense Refill    simvastatin (ZOCOR) 40 mg tablet TAKE 1 TABLET NIGHTLY 90 Tab 2    clopidogrel (PLAVIX) 75 mg tab TAKE 1 TABLET DAILY 90 Tab 2    famotidine (PEPCID) 20 mg tablet Take 1 Tab by mouth two (2) times a day. 60 Tab 3    Triamcinolone Acetonide (NASACORT AQ) 55 mcg/Actuation Aero nasal spray 2 Sprays by Both Nostrils route daily as needed (nasal congestion). 3 Container 3      Patient Active Problem List   Diagnosis Code    GERD (gastroesophageal reflux disease) K21.9    Seasonal allergic rhinitis J30.2    Hemorrhoids, internal K64.8    Polyp of colon K63.5    Enlarged prostate N40.0    Dyslipidemia E78.5    MCKENNA (obstructive sleep apnea) G47.33    History of TIA (transient ischemic attack) Z86.73    Benign prostatic hyperplasia with urinary frequency N40.1, R35.0     Review of Systems   Respiratory: Negative. Cardiovascular: Negative. Skin: Negative. Neurological: Negative. PMH: reviewed medications and allergy lists and medical and family history.   Lab Results   Component Value Date/Time    Cholesterol, total 156 04/15/2019 10:44 AM    HDL Cholesterol 82 (H) 04/15/2019 10:44 AM    LDL, calculated 53.2 04/15/2019 10:44 AM    VLDL, calculated 20.8 04/15/2019 10:44 AM    Triglyceride 104 04/15/2019 10:44 AM    CHOL/HDL Ratio 1.9 04/15/2019 10:44 AM     Lab Results   Component Value Date/Time    Sodium 140 04/15/2019 10:44 AM    Potassium 4.7 04/15/2019 10:44 AM    Chloride 105 04/15/2019 10:44 AM    CO2 27 04/15/2019 10:44 AM    Anion gap 8 04/15/2019 10:44 AM    Glucose 91 04/15/2019 10:44 AM    BUN 11 04/15/2019 10:44 AM    Creatinine 1.04 04/15/2019 10:44 AM    BUN/Creatinine ratio 11 (L) 04/15/2019 10:44 AM    GFR est AA >60 04/15/2019 10:44 AM    GFR est non-AA >60 04/15/2019 10:44 AM    Calcium 9.3 04/15/2019 10:44 AM    Bilirubin, total 0.6 04/15/2019 10:44 AM    AST (SGOT) 31 04/15/2019 10:44 AM    Alk. phosphatase 83 04/15/2019 10:44 AM    Protein, total 7.1 04/15/2019 10:44 AM    Albumin 3.8 04/15/2019 10:44 AM    Globulin 3.3 04/15/2019 10:44 AM    A-G Ratio 1.2 04/15/2019 10:44 AM    ALT (SGPT) 35 04/15/2019 10:44 AM     Lab Results   Component Value Date/Time    WBC 9.0 04/15/2019 10:44 AM    HGB 16.1 (H) 04/15/2019 10:44 AM    HCT 49.6 (H) 04/15/2019 10:44 AM    PLATELET 096 42/76/0619 10:44 AM    MCV 99.2 (H) 04/15/2019 10:44 AM       OBJECTIVE:  Awake and alert in no acute distress  Neck supple without lymphadenopathy, no carotid artery bruits auscultated bilaterally. No thyromegaly  Lungs clear throughout  S1 S2 RRR without ectopy or murmur auscultated. Extremities: no clubbing, cyanosis, peripheral edema    There were no vitals taken for this visit. Diagnoses and all orders for this visit:    Dyslipidemia Stable continue current treatment plan      Benign prostatic hyperplasia with urinary frequency Stable continue current treatment plan      Gastroesophageal reflux disease, esophagitis presence not specified Stable continue current treatment plan    MCKENNA (obstructive sleep apnea) not using CPAP    Health maintenance reviewed  Patient agrees with plan and verbalizes understanding. I have discussed the diagnosis with the patient and the intended plan as seen in the above orders. The patient has received an after-visit summary and questions were answered concerning future plans. I have discussed medication side effects and warnings with the patient as well.     Follow-up and Dispositions    · Return in about 4 months (around 12/15/2019) for cris .

## 2019-12-16 ENCOUNTER — OFFICE VISIT (OUTPATIENT)
Dept: FAMILY MEDICINE CLINIC | Age: 64
End: 2019-12-16

## 2019-12-16 ENCOUNTER — HOSPITAL ENCOUNTER (OUTPATIENT)
Dept: LAB | Age: 64
Discharge: HOME | End: 2019-12-16
Payer: MEDICARE

## 2019-12-16 VITALS
TEMPERATURE: 98 F | RESPIRATION RATE: 16 BRPM | HEART RATE: 93 BPM | WEIGHT: 191.2 LBS | DIASTOLIC BLOOD PRESSURE: 68 MMHG | HEIGHT: 70 IN | BODY MASS INDEX: 27.37 KG/M2 | SYSTOLIC BLOOD PRESSURE: 126 MMHG | OXYGEN SATURATION: 99 %

## 2019-12-16 DIAGNOSIS — Z86.73 HISTORY OF TIA (TRANSIENT ISCHEMIC ATTACK): ICD-10-CM

## 2019-12-16 DIAGNOSIS — E78.5 DYSLIPIDEMIA: Primary | ICD-10-CM

## 2019-12-16 DIAGNOSIS — K21.9 GASTROESOPHAGEAL REFLUX DISEASE, ESOPHAGITIS PRESENCE NOT SPECIFIED: ICD-10-CM

## 2019-12-16 DIAGNOSIS — E78.5 DYSLIPIDEMIA: ICD-10-CM

## 2019-12-16 DIAGNOSIS — J30.1 SEASONAL ALLERGIC RHINITIS DUE TO POLLEN: ICD-10-CM

## 2019-12-16 PROCEDURE — 36415 COLL VENOUS BLD VENIPUNCTURE: CPT

## 2019-12-16 PROCEDURE — 80053 COMPREHEN METABOLIC PANEL: CPT

## 2019-12-16 PROCEDURE — 80061 LIPID PANEL: CPT

## 2019-12-16 RX ORDER — LORATADINE 10 MG/1
10 TABLET ORAL
Qty: 30 TAB | Refills: 2 | Status: SHIPPED | OUTPATIENT
Start: 2019-12-16

## 2019-12-16 NOTE — PROGRESS NOTES
Chief Complaint   Patient presents with    Cholesterol Problem     1. Have you been to the ER, urgent care clinic since your last visit? Hospitalized since your last visit? No    2. Have you seen or consulted any other health care providers outside of the [address] [place] [address] since your last visit? Include any pap smears or colon screening.  No

## 2019-12-16 NOTE — PROGRESS NOTES
Subjective:   Uriel Mathias is a 71 y.o. male with hyperlipidemia and history of TIA and GERD   Taking all prescribed medications     Current Outpatient Medications   Medication Sig Dispense Refill    simvastatin (ZOCOR) 40 mg tablet TAKE 1 TABLET NIGHTLY 90 Tab 2    clopidogrel (PLAVIX) 75 mg tab TAKE 1 TABLET DAILY 90 Tab 2    famotidine (PEPCID) 20 mg tablet Take 1 Tab by mouth two (2) times a day. 60 Tab 3    Triamcinolone Acetonide (NASACORT AQ) 55 mcg/Actuation Aero nasal spray 2 Sprays by Both Nostrils route daily as needed (nasal congestion). 3 Container 3      Review of Systems   HENT: Positive for congestion. Negative for sore throat. Nasal and alternating  Using nasal saline spray prn   Using humidifier some help in symptoms    Respiratory: Negative. Cardiovascular: Negative. Neurological: Negative. Wt Readings from Last 3 Encounters:   12/16/19 191 lb 3.2 oz (86.7 kg)   08/15/19 186 lb (84.4 kg)   07/05/19 184 lb (83.5 kg)     PMH: reviewed medications and allergy lists and medical and family history. OBJECTIVE:  Awake and alert in no acute distress  HEENT: nares patent with erythema, boggy, clear secretions bilaterally. TMs retracted bilaterally, pharynx without erythema, neck supple with shotty lymphadenopathy. No TTP over sinus passages bilaterally   Neck supple without lymphadenopathy, no carotid artery bruits auscultated bilaterally. No thyromegaly  Lungs clear throughout  S1 S2 RRR without ectopy or murmur auscultated. Extremities: no clubbing, cyanosis, peripheral edema    Visit Vitals  /68 (BP 1 Location: Left arm, BP Patient Position: Sitting)   Pulse 93   Temp 98 °F (36.7 °C) (Oral)   Resp 16   Ht 5' 10\" (1.778 m)   Wt 191 lb 3.2 oz (86.7 kg)   SpO2 99%   BMI 27.43 kg/m²   Diagnoses and all orders for this visit:    Dyslipidemia  -     LIPID PANEL; Future  -     METABOLIC PANEL, COMPREHENSIVE;  Future    Gastroesophageal reflux disease, esophagitis presence not specified Stable continue current treatment plan    History of TIA (transient ischemic attack)    Seasonal allergic rhinitis due to pollen  -     loratadine (CLARITIN) 10 mg tablet; Take 1 Tab by mouth daily as needed for Allergies. , Normal, Disp-30 Tab, R-2  No influenza vaccine in office patient counseled and advised to go to local pharmacy for immunization   Reviewed risks and benefits and common side effects of new medication  General comfort measures  Patient agrees with plan and verbalizes understanding. I have discussed the diagnosis with the patient and the intended plan as seen in the above orders. The patient has received an after-visit summary and questions were answered concerning future plans. I have discussed medication side effects and warnings with the patient as well. Follow-up and Dispositions    · Return in about 4 months (around 4/16/2020), or if symptoms worsen or fail to improve, for hld .

## 2019-12-17 LAB
ALBUMIN SERPL-MCNC: 4 G/DL (ref 3.4–5)
ALBUMIN/GLOB SERPL: 1.2 {RATIO} (ref 0.8–1.7)
ALP SERPL-CCNC: 75 U/L (ref 45–117)
ALT SERPL-CCNC: 38 U/L (ref 16–61)
ANION GAP SERPL CALC-SCNC: 7 MMOL/L (ref 3–18)
AST SERPL-CCNC: 24 U/L (ref 10–38)
BILIRUB SERPL-MCNC: 1 MG/DL (ref 0.2–1)
BUN SERPL-MCNC: 9 MG/DL (ref 7–18)
BUN/CREAT SERPL: 10 (ref 12–20)
CALCIUM SERPL-MCNC: 9.6 MG/DL (ref 8.5–10.1)
CHLORIDE SERPL-SCNC: 107 MMOL/L (ref 100–111)
CHOLEST SERPL-MCNC: 171 MG/DL
CO2 SERPL-SCNC: 28 MMOL/L (ref 21–32)
CREAT SERPL-MCNC: 0.94 MG/DL (ref 0.6–1.3)
GLOBULIN SER CALC-MCNC: 3.3 G/DL (ref 2–4)
GLUCOSE SERPL-MCNC: 94 MG/DL (ref 74–99)
HDLC SERPL-MCNC: 77 MG/DL (ref 40–60)
HDLC SERPL: 2.2 {RATIO} (ref 0–5)
LDLC SERPL CALC-MCNC: 66.6 MG/DL (ref 0–100)
LIPID PROFILE,FLP: ABNORMAL
POTASSIUM SERPL-SCNC: 4.8 MMOL/L (ref 3.5–5.5)
PROT SERPL-MCNC: 7.3 G/DL (ref 6.4–8.2)
SODIUM SERPL-SCNC: 142 MMOL/L (ref 136–145)
TRIGL SERPL-MCNC: 137 MG/DL (ref ?–150)
VLDLC SERPL CALC-MCNC: 27.4 MG/DL

## 2020-02-28 DIAGNOSIS — E78.5 DYSLIPIDEMIA: ICD-10-CM

## 2020-02-28 DIAGNOSIS — Z86.73 HISTORY OF TIA (TRANSIENT ISCHEMIC ATTACK): ICD-10-CM

## 2020-02-28 RX ORDER — CLOPIDOGREL BISULFATE 75 MG/1
TABLET ORAL
Qty: 90 TAB | Refills: 1 | Status: SHIPPED | OUTPATIENT
Start: 2020-02-28 | End: 2020-08-26 | Stop reason: SDUPTHER

## 2020-02-28 RX ORDER — SIMVASTATIN 40 MG/1
TABLET, FILM COATED ORAL
Qty: 90 TAB | Refills: 1 | Status: SHIPPED | OUTPATIENT
Start: 2020-02-28 | End: 2020-08-26 | Stop reason: SDUPTHER

## 2020-03-06 ENCOUNTER — OFFICE VISIT (OUTPATIENT)
Dept: FAMILY MEDICINE CLINIC | Age: 65
End: 2020-03-06

## 2020-03-06 VITALS
TEMPERATURE: 98.1 F | SYSTOLIC BLOOD PRESSURE: 128 MMHG | WEIGHT: 182 LBS | HEART RATE: 91 BPM | OXYGEN SATURATION: 97 % | BODY MASS INDEX: 26.05 KG/M2 | DIASTOLIC BLOOD PRESSURE: 64 MMHG | HEIGHT: 70 IN | RESPIRATION RATE: 16 BRPM

## 2020-03-06 DIAGNOSIS — J01.90 ACUTE SINUSITIS, RECURRENCE NOT SPECIFIED, UNSPECIFIED LOCATION: Primary | ICD-10-CM

## 2020-03-06 DIAGNOSIS — R05.9 COUGH: ICD-10-CM

## 2020-03-06 DIAGNOSIS — R09.81 NASAL SINUS CONGESTION: ICD-10-CM

## 2020-03-06 RX ORDER — PROMETHAZINE HYDROCHLORIDE AND DEXTROMETHORPHAN HYDROBROMIDE 6.25; 15 MG/5ML; MG/5ML
5 SYRUP ORAL
Qty: 140 ML | Refills: 0 | Status: SHIPPED | OUTPATIENT
Start: 2020-03-06 | End: 2020-03-13

## 2020-03-06 RX ORDER — CEFUROXIME AXETIL 500 MG/1
500 TABLET ORAL 2 TIMES DAILY
Qty: 20 TAB | Refills: 0 | Status: SHIPPED | OUTPATIENT
Start: 2020-03-06 | End: 2020-10-06 | Stop reason: ALTCHOICE

## 2020-03-06 NOTE — PATIENT INSTRUCTIONS
Cough: Care Instructions Your Care Instructions A cough is your body's response to something that bothers your throat or airways. Many things can cause a cough. You might cough because of a cold or the flu, bronchitis, or asthma. Smoking, postnasal drip, allergies, and stomach acid that backs up into your throat also can cause coughs. A cough is a symptom, not a disease. Most coughs stop when the cause, such as a cold, goes away. You can take a few steps at home to cough less and feel better. Follow-up care is a key part of your treatment and safety. Be sure to make and go to all appointments, and call your doctor if you are having problems. It's also a good idea to know your test results and keep a list of the medicines you take. How can you care for yourself at home? · Drink lots of water and other fluids. This helps thin the mucus and soothes a dry or sore throat. Honey or lemon juice in hot water or tea may ease a dry cough. · Take cough medicine as directed by your doctor. · Prop up your head on pillows to help you breathe and ease a dry cough. · Try cough drops to soothe a dry or sore throat. Cough drops don't stop a cough. Medicine-flavored cough drops are no better than candy-flavored drops or hard candy. · Do not smoke. Avoid secondhand smoke. If you need help quitting, talk to your doctor about stop-smoking programs and medicines. These can increase your chances of quitting for good. When should you call for help? Call 911 anytime you think you may need emergency care.  For example, call if: 
  · You have severe trouble breathing.  
 Call your doctor now or seek immediate medical care if: 
  · You cough up blood.  
  · You have new or worse trouble breathing.  
  · You have a new or higher fever.  
  · You have a new rash.  
 Watch closely for changes in your health, and be sure to contact your doctor if: 
  · You cough more deeply or more often, especially if you notice more mucus or a change in the color of your mucus.  
  · You have new symptoms, such as a sore throat, an earache, or sinus pain.  
  · You do not get better as expected. Where can you learn more? Go to http://jose d-valorie.info/. Enter D279 in the search box to learn more about \"Cough: Care Instructions. \" Current as of: June 9, 2019 Content Version: 12.2 © 9950-2215 [employer]. Care instructions adapted under license by [employer] (which disclaims liability or warranty for this information). If you have questions about a medical condition or this instruction, always ask your healthcare professional. [place] [address] any warranty or liability for your use of this information. Sinusitis: Care Instructions Your Care Instructions Sinusitis is an infection of the lining of the sinus cavities in your head. Sinusitis often follows a cold. It causes pain and pressure in your head and face. In most cases, sinusitis gets better on its own in 1 to 2 weeks. But some mild symptoms may last for several weeks. Sometimes antibiotics are needed. Follow-up care is a key part of your treatment and safety. Be sure to make and go to all appointments, and call your doctor if you are having problems. It's also a good idea to know your test results and keep a list of the medicines you take. How can you care for yourself at home? · Take an over-the-counter pain medicine, such as acetaminophen (Tylenol), ibuprofen (Advil, Motrin), or naproxen (Aleve). Read and follow all instructions on the label. · If the doctor prescribed antibiotics, take them as directed. Do not stop taking them just because you feel better. You need to take the full course of antibiotics. · Be careful when taking over-the-counter cold or flu medicines and Tylenol at the same time.  Many of these medicines have acetaminophen, which is Tylenol. Read the labels to make sure that you are not taking more than the recommended dose. Too much acetaminophen (Tylenol) can be harmful. · Breathe warm, moist air from a steamy shower, a hot bath, or a sink filled with hot water. Avoid cold, dry air. Using a humidifier in your home may help. Follow the directions for cleaning the machine. · Use saline (saltwater) nasal washes to help keep your nasal passages open and wash out mucus and bacteria. You can buy saline nose drops at a grocery store or drugstore. Or you can make your own at home by adding 1 teaspoon of salt and 1 teaspoon of baking soda to 2 cups of distilled water. If you make your own, fill a bulb syringe with the solution, insert the tip into your nostril, and squeeze gently. Marleta Gonzalez your nose. · Put a hot, wet towel or a warm gel pack on your face 3 or 4 times a day for 5 to 10 minutes each time. · Try a decongestant nasal spray like oxymetazoline (Afrin). Do not use it for more than 3 days in a row. Using it for more than 3 days can make your congestion worse. When should you call for help? Call your doctor now or seek immediate medical care if: 
  · You have new or worse swelling or redness in your face or around your eyes.  
  · You have a new or higher fever.  
 Watch closely for changes in your health, and be sure to contact your doctor if: 
  · You have new or worse facial pain.  
  · The mucus from your nose becomes thicker (like pus) or has new blood in it.  
  · You are not getting better as expected. Where can you learn more? Go to http://jose d-valorie.info/. Enter P213 in the search box to learn more about \"Sinusitis: Care Instructions. \" Current as of: October 21, 2018 Content Version: 12.2 © 9262-7838 [employer].  Care instructions adapted under license by [employer] (which disclaims liability or warranty for this information). If you have questions about a medical condition or this instruction, always ask your healthcare professional. [address] any warranty or liability for your use of this information. Cefuroxime (By mouth) Cefuroxime Axetil (cri-zz-CAQ-eem AX-e-til) Treats infections. This medicine is a cephalosporin antibiotic. Brand Name(s): Ceftin There may be other brand names for this medicine. When This Medicine Should Not Be Used: This medicine is not right for everyone. Do not use it if you had an allergic reaction to cefuroxime or similar medicine, including penicillin antibiotics. How to Use This Medicine:  
Liquid, Tablet · Your doctor will tell you how much medicine to use. Do not use more than directed. · Tablet: Swallow whole. Do not break, crush, or chew it. The medicine tastes bitter when it is broken or crushed. If you cannot swallow the tablet whole, tell your doctor. · Oral liquid: Shake well just before you measure each dose. Measure the medicine with a marked measuring spoon, oral syringe, or medicine cup. It is best to take this medicine with food or milk. · If you change from one form of this medicine to another, make sure you understand how much medicine to take. The dose for the tablet is not the same as the dose for the oral liquid form. · Take all of the medicine in your prescription to clear up your infection, even if you feel better after the first few doses. · Missed dose: Take a dose as soon as you remember. If it is almost time for your next dose, wait until then and take a regular dose. Do not take extra medicine to make up for a missed dose. · Tablets:Store the medicine in a closed container at room temperature, away from heat, moisture, and direct light. · Oral liquid: Store in the refrigerator. Do not freeze. Throw away any unused medicine after 10 days. Drugs and Foods to Avoid: Ask your doctor or pharmacist before using any other medicine, including over-the-counter medicines, vitamins, and herbal products. · Some food and medicines can affect how cefuroxime works. Tell your doctor if you are using any of the following: ¨ Probenecid ¨ Birth control pills ¨ Acid reducer medicine for the stomach · If you take an antacid, take this medicine at least 1 hour before or 2 hours after the antacid. Warnings While Using This Medicine: · Tell your doctor if you are pregnant or breastfeeding, or if you have kidney disease, liver disease, or allergies. · Tell your doctor if you have phenylketonuria (PKU). The oral liquid contains phenylalanine. · This medicine can cause diarrhea. Call your doctor if the diarrhea becomes severe, does not stop, or is bloody. Do not take any medicine to stop diarrhea until you have talked to your doctor. Diarrhea can occur 2 months or more after you stop taking this medicine. · Tell any doctor or dentist who treats you that you are using this medicine. This medicine may affect certain medical test results. · Call your doctor if your symptoms do not improve or if they get worse. Possible Side Effects While Using This Medicine:  
Call your doctor right away if you notice any of these side effects: · Allergic reaction: Itching or hives, swelling in your face or hands, swelling or tingling in your mouth or throat, chest tightness, trouble breathing · Blistering, peeling, red skin rash · Severe diarrhea If you notice these less serious side effects, talk with your doctor: · Mild diarrhea or vomiting If you notice other side effects that you think are caused by this medicine, tell your doctor. Call your doctor for medical advice about side effects. You may report side effects to FDA at 9-844-FDA-1022 © 2017 [facility] Information is for End User's use only and may not be sold, redistributed or otherwise used for commercial purposes. The above information is an  only. It is not intended as medical advice for individual conditions or treatments. Talk to your doctor, nurse or pharmacist before following any medical regimen to see if it is safe and effective for you.

## 2020-03-06 NOTE — PROGRESS NOTES
Chief Complaint   Patient presents with    Nasal Congestion     for the past 4 weeks     Chest Congestion     1. Have you been to the ER, urgent care clinic since your last visit? Hospitalized since your last visit? [facility] the first week of feb for sinus infection    2. Have you seen or consulted any other health care providers outside of the [address] [place] [address] since your last visit? Include any pap smears or colon screening.   No

## 2020-03-06 NOTE — PROGRESS NOTES
HISTORY OF PRESENT ILLNESS    Pascale Hernandez is a 71y.o. year old male here today for: nasal congestion     Onset four weeks   Context patient was last seen in December 2010 with complaints of seasonal allergic rhinitis   Uses loratadine and Nasacort AQ prn   Site nasal passages, sinuses bilaterally   Duration  Four weeks    Type of pain or sensation pressure pain over forehead and cheeks of face   Associated symptoms headache  Severity moderate persistent   Exacerbating factors none   Relieving factors using Claritin and nasal steroid no help with symptoms   Review of Systems   Constitutional: Negative. HENT: Negative for ear pain, nosebleeds and sore throat. Respiratory: Negative for hemoptysis, sputum production, shortness of breath and wheezing. Cardiovascular: Negative for chest pain. Current Outpatient Medications   Medication Sig Dispense Refill    simvastatin (ZOCOR) 40 mg tablet TAKE 1 TABLET NIGHTLY 90 Tab 1    clopidogreL (PLAVIX) 75 mg tab TAKE 1 TABLET DAILY 90 Tab 1    loratadine (CLARITIN) 10 mg tablet Take 1 Tab by mouth daily as needed for Allergies. 30 Tab 2    famotidine (PEPCID) 20 mg tablet Take 1 Tab by mouth two (2) times a day. 60 Tab 3    Triamcinolone Acetonide (NASACORT AQ) 55 mcg/Actuation Aero nasal spray 2 Sprays by Both Nostrils route daily as needed (nasal congestion). 3 Container 3     Patient Active Problem List   Diagnosis Code    GERD (gastroesophageal reflux disease) K21.9    Seasonal allergic rhinitis J30.2    Hemorrhoids, internal K64.8    Polyp of colon K63.5    Enlarged prostate N40.0    Dyslipidemia E78.5    MCKENNA (obstructive sleep apnea) G47.33    History of TIA (transient ischemic attack) Z86.73    Benign prostatic hyperplasia with urinary frequency N40.1, R35.0     Reviewed past medical, family and social history    OBJECTIVE:  Awake and alert in no acute distress  HEENT: nares patent with erythema, boggy, clear secretions bilaterally.  TMs retracted bilaterally, pharynx without erythema, neck supple with shotty lymphadenopathy. Exquisite tenderness over maxillary and ethmoid sinus regions bilaterally    Lungs clear throughout  S1 S2 RRR without ectopy or murmur auscultated. Integumentary: no rashes  Normal skin turgor  Visit Vitals  /64 (BP 1 Location: Left arm, BP Patient Position: Sitting)   Pulse 91   Temp 98.1 °F (36.7 °C) (Oral)   Resp 16   Ht 5' 10\" (1.778 m)   Wt 182 lb (82.6 kg)   SpO2 97%   BMI 26.11 kg/m²     Diagnoses and all orders for this visit:    Acute sinusitis, recurrence not specified, unspecified location  -     cefUROXime (CEFTIN) 500 mg tablet; Take 1 Tab by mouth two (2) times a day., Normal, Disp-20 Tab, R-0    Cough  -     promethazine-dextromethorphan (PROMETHAZINE-DM) 6.25-15 mg/5 mL syrup; Take 5 mL by mouth four (4) times daily as needed for Cough for up to 7 days. , Normal, Disp-140 mL, R-0    Nasal sinus congestion  -     promethazine-dextromethorphan (PROMETHAZINE-DM) 6.25-15 mg/5 mL syrup; Take 5 mL by mouth four (4) times daily as needed for Cough for up to 7 days. , Normal, Disp-140 mL, R-0      Reviewed risks and benefits and common side effects of new medication  Hold Claritin while taking promethazine-DM may resume when he stops the promethazine-DM syrup   General comfort measures  Patient agrees with plan and verbalizes understanding. I have discussed the diagnosis with the patient and the intended plan as seen in the above orders. The patient has received an after-visit summary and questions were answered concerning future plans. I have discussed medication side effects and warnings with the patient as well. Follow-up and Dispositions    · Return if symptoms worsen or fail to improve.

## 2020-06-02 ENCOUNTER — VIRTUAL VISIT (OUTPATIENT)
Dept: FAMILY MEDICINE CLINIC | Age: 65
End: 2020-06-02

## 2020-06-02 DIAGNOSIS — E78.5 DYSLIPIDEMIA: Primary | ICD-10-CM

## 2020-06-02 NOTE — PROGRESS NOTES
Mary Lou Ceja is a 71 y.o. male who was seen by synchronous (real-time) audio-video technology on 6/2/2020. Consent: Mary Lou Ceja, who was seen by synchronous (real-time) audio-video technology, and/or his healthcare decision maker, is aware that this patient-initiated, Telehealth encounter on 6/2/2020 is a billable service, with coverage as determined by his insurance carrier. He is aware that he may receive a bill and has provided verbal consent to proceed: Yes. Assessment & Plan:   Diagnoses and all orders for this visit:    1. Dyslipidemia  -     LIPID PANEL; Future  -     METABOLIC PANEL, BASIC; Future  -     AST; Future  -     ALT; Future  -     CBC WITH AUTOMATED DIFF; Future      . as above,  treatment plan as listed below  Orders Placed This Encounter    LIPID PANEL    METABOLIC PANEL, BASIC    AST    ALT    CBC WITH AUTOMATED DIFF     Continue zocor  Follow-up and Dispositions    · Return in about 4 months (around 10/2/2020) for medicare well. This has been fully explained to the patient, who indicates understanding. AVS is available via [email or web address]  Subjective:   Mary Lou Ceja is a 71 y.o. male who was seen for Cholesterol Problem    Pt has hypercholesterolemia  He is on zocor; tolerates med daily  Attempts a lower cholesterol diet  He is not sedentary  Prior to Admission medications    Medication Sig Start Date End Date Taking? Authorizing Provider   simvastatin (ZOCOR) 40 mg tablet TAKE 1 TABLET NIGHTLY 2/28/20  Yes Patricia Campos NP   clopidogreL (PLAVIX) 75 mg tab TAKE 1 TABLET DAILY 2/28/20  Yes Patricia Campos NP   Triamcinolone Acetonide (NASACORT AQ) 55 mcg/Actuation Aero nasal spray 2 Sprays by Both Nostrils route daily as needed (nasal congestion). 2/21/12  Yes Patricia Campos NP   cefUROXime (CEFTIN) 500 mg tablet Take 1 Tab by mouth two (2) times a day.  3/6/20   Patricia Campos NP   loratadine (CLARITIN) 10 mg tablet Take 1 Tab by mouth daily as needed for Allergies. 19   Izzy Campos NP   famotidine (PEPCID) 20 mg tablet Take 1 Tab by mouth two (2) times a day. 13   Izzy Campos NP     Allergies   Allergen Reactions    Bactrim [Sulfamethoprim Ds] Rash    Nexium [Esomeprazole Magnesium] Other (comments)     Anxiety    Relafen [Nabumetone] Rash       Patient Active Problem List    Diagnosis Date Noted    Benign prostatic hyperplasia with urinary frequency 2018    History of TIA (transient ischemic attack) 2018    Dyslipidemia 02/15/2012    MCKENNA (obstructive sleep apnea) 02/15/2012    Hemorrhoids, internal 2010    Polyp of colon 2010    Enlarged prostate 2010    GERD (gastroesophageal reflux disease) 2010    Seasonal allergic rhinitis 2010     Allergies   Allergen Reactions    Bactrim [Sulfamethoprim Ds] Rash    Nexium [Esomeprazole Magnesium] Other (comments)     Anxiety    Relafen [Nabumetone] Rash     Past Medical History:   Diagnosis Date    Dyslipidemia 2/15/2012    GERD (gastroesophageal reflux disease)     Hiatal hernia     IBS (irritable bowel syndrome)     mild     MCKENNA (obstructive sleep apnea) 2/15/2012    does not use cpap machine, oral device only    Seasonal allergies     Transient ischemic attack 2/15/2012     Past Surgical History:   Procedure Laterality Date    HX GI  2000's    Fistulatomy    HX TONSILLECTOMY  30 years ago     No family history on file. Social History     Tobacco Use    Smoking status: Former Smoker     Packs/day: 1.00     Years: 30.00     Pack years: 30.00     Last attempt to quit: 2012     Years since quittin.5    Smokeless tobacco: Former User    Tobacco comment: quit smoking in , e-ciggerate (vapor)   Substance Use Topics    Alcohol use: Yes     Comment: 12 pack per week       Review of Systems   Constitutional: Negative for chills and fever. HENT: Negative for sore throat.     Respiratory: Negative for cough, shortness of breath and wheezing. Cardiovascular: Negative for chest pain and palpitations. All other systems reviewed and are negative. Objective: There were no vitals taken for this visit. General: alert, cooperative, no distress   Mental  status: normal mood, behavior, speech, dress, motor activity, and thought processes, able to follow commands   HENT: NCAT   Neck: no visualized mass   Resp: no respiratory distress   Neuro: no gross deficits   Skin: no discoloration or lesions of concern on visible areas   Psychiatric: normal affect, consistent with stated mood, no evidence of hallucinations     Additional exam findings: none      We discussed the expected course, resolution and complications of the diagnosis(es) in detail. Medication risks, benefits, costs, interactions, and alternatives were discussed as indicated. I advised him to contact the office if his condition worsens, changes or fails to improve as anticipated. He expressed understanding with the diagnosis(es) and plan. Peter Angel is a 71 y.o. male who was evaluated by a video visit encounter for concerns as above. Patient identification was verified prior to start of the visit. A caregiver was present when appropriate. Due to this being a TeleHealth encounter (During ZRJ-92 public health emergency), evaluation of the following organ systems was limited: Vitals/Constitutional/EENT/Resp/CV/GI//MS/Neuro/Skin/Heme-Lymph-Imm. Pursuant to the emergency declaration under the [facility], 1135 waiver authority and the [employer] General Act, this Virtual  Visit was conducted, with patient's (and/or legal guardian's) consent, to reduce the patient's risk of exposure to COVID-19 and provide necessary medical care.      Services were provided through a video synchronous discussion virtually to substitute for in-person clinic visit. Patient was at home and provider was at the office.     Dayo Beck MD

## 2020-06-04 ENCOUNTER — HOSPITAL ENCOUNTER (OUTPATIENT)
Dept: LAB | Age: 65
Discharge: HOME | End: 2020-06-04
Payer: COMMERCIAL

## 2020-06-04 DIAGNOSIS — E78.5 DYSLIPIDEMIA: ICD-10-CM

## 2020-06-04 LAB
ALT SERPL-CCNC: 41 U/L (ref 16–61)
ANION GAP SERPL CALC-SCNC: 6 MMOL/L (ref 3–18)
AST SERPL-CCNC: 24 U/L (ref 10–38)
BASOPHILS # BLD: 0 K/UL (ref 0–0.1)
BASOPHILS NFR BLD: 1 % (ref 0–2)
BUN SERPL-MCNC: 9 MG/DL (ref 7–18)
BUN/CREAT SERPL: 9 (ref 12–20)
CALCIUM SERPL-MCNC: 8.8 MG/DL (ref 8.5–10.1)
CHLORIDE SERPL-SCNC: 108 MMOL/L (ref 100–111)
CHOLEST SERPL-MCNC: 142 MG/DL
CO2 SERPL-SCNC: 28 MMOL/L (ref 21–32)
CREAT SERPL-MCNC: 0.98 MG/DL (ref 0.6–1.3)
DIFFERENTIAL METHOD BLD: ABNORMAL
EOSINOPHIL # BLD: 0.3 K/UL (ref 0–0.4)
EOSINOPHIL NFR BLD: 3 % (ref 0–5)
ERYTHROCYTE [DISTWIDTH] IN BLOOD BY AUTOMATED COUNT: 14 % (ref 11.6–14.5)
GLUCOSE SERPL-MCNC: 99 MG/DL (ref 74–99)
HCT VFR BLD AUTO: 46.4 % (ref 36–48)
HDLC SERPL-MCNC: 94 MG/DL (ref 40–60)
HDLC SERPL: 1.5 {RATIO} (ref 0–5)
HGB BLD-MCNC: 15.3 G/DL (ref 13–16)
LDLC SERPL CALC-MCNC: 38.2 MG/DL (ref 0–100)
LIPID PROFILE,FLP: ABNORMAL
LYMPHOCYTES # BLD: 2.8 K/UL (ref 0.9–3.6)
LYMPHOCYTES NFR BLD: 32 % (ref 21–52)
MCH RBC QN AUTO: 32.2 PG (ref 24–34)
MCHC RBC AUTO-ENTMCNC: 33 G/DL (ref 31–37)
MCV RBC AUTO: 97.7 FL (ref 74–97)
MONOCYTES # BLD: 0.8 K/UL (ref 0.05–1.2)
MONOCYTES NFR BLD: 9 % (ref 3–10)
NEUTS SEG # BLD: 4.7 K/UL (ref 1.8–8)
NEUTS SEG NFR BLD: 55 % (ref 40–73)
PLATELET # BLD AUTO: 296 K/UL (ref 135–420)
PMV BLD AUTO: 10.9 FL (ref 9.2–11.8)
POTASSIUM SERPL-SCNC: 4.7 MMOL/L (ref 3.5–5.5)
RBC # BLD AUTO: 4.75 M/UL (ref 4.7–5.5)
SODIUM SERPL-SCNC: 142 MMOL/L (ref 136–145)
TRIGL SERPL-MCNC: 49 MG/DL (ref ?–150)
VLDLC SERPL CALC-MCNC: 9.8 MG/DL
WBC # BLD AUTO: 8.5 K/UL (ref 4.6–13.2)

## 2020-06-04 PROCEDURE — 36415 COLL VENOUS BLD VENIPUNCTURE: CPT

## 2020-06-04 PROCEDURE — 80061 LIPID PANEL: CPT

## 2020-06-04 PROCEDURE — 84460 ALANINE AMINO (ALT) (SGPT): CPT

## 2020-06-04 PROCEDURE — 85025 COMPLETE CBC W/AUTO DIFF WBC: CPT

## 2020-06-04 PROCEDURE — 80048 BASIC METABOLIC PNL TOTAL CA: CPT

## 2020-06-04 PROCEDURE — 84450 TRANSFERASE (AST) (SGOT): CPT

## 2020-06-07 NOTE — PATIENT INSTRUCTIONS

## 2020-08-26 DIAGNOSIS — Z86.73 HISTORY OF TIA (TRANSIENT ISCHEMIC ATTACK): ICD-10-CM

## 2020-08-26 DIAGNOSIS — E78.5 DYSLIPIDEMIA: ICD-10-CM

## 2020-08-26 RX ORDER — CLOPIDOGREL BISULFATE 75 MG/1
75 TABLET ORAL DAILY
Qty: 90 TAB | Refills: 1 | Status: SHIPPED | OUTPATIENT
Start: 2020-08-26 | End: 2021-02-26

## 2020-08-26 RX ORDER — SIMVASTATIN 40 MG/1
40 TABLET, FILM COATED ORAL
Qty: 90 TAB | Refills: 1 | Status: SHIPPED | OUTPATIENT
Start: 2020-08-26 | End: 2021-02-26

## 2020-08-26 NOTE — TELEPHONE ENCOUNTER
Last Visit: 6/2/20 with MD Leni Biswas  Next Appointment: 10/6/20 with MD Leni Biswas  Previous Refill Encounter(s): 2/28/20 #90 with 1 refill    Requested Prescriptions     Pending Prescriptions Disp Refills    clopidogreL (PLAVIX) 75 mg tab 90 Tab 1     Sig: Take 1 Tab by mouth daily.  simvastatin (ZOCOR) 40 mg tablet 90 Tab 1     Sig: Take 1 Tab by mouth nightly.

## 2020-10-06 ENCOUNTER — VIRTUAL VISIT (OUTPATIENT)
Dept: FAMILY MEDICINE CLINIC | Age: 65
End: 2020-10-06
Payer: MEDICARE

## 2020-10-06 DIAGNOSIS — N40.1 BENIGN PROSTATIC HYPERPLASIA WITH URINARY FREQUENCY: ICD-10-CM

## 2020-10-06 DIAGNOSIS — Z72.0 TOBACCO USE: ICD-10-CM

## 2020-10-06 DIAGNOSIS — R35.0 BENIGN PROSTATIC HYPERPLASIA WITH URINARY FREQUENCY: ICD-10-CM

## 2020-10-06 DIAGNOSIS — Z00.00 WELCOME TO MEDICARE PREVENTIVE VISIT: Primary | ICD-10-CM

## 2020-10-06 DIAGNOSIS — E78.5 DYSLIPIDEMIA: ICD-10-CM

## 2020-10-06 PROCEDURE — G8427 DOCREV CUR MEDS BY ELIG CLIN: HCPCS | Performed by: FAMILY MEDICINE

## 2020-10-06 PROCEDURE — G0402 INITIAL PREVENTIVE EXAM: HCPCS | Performed by: FAMILY MEDICINE

## 2020-10-06 PROCEDURE — G8419 CALC BMI OUT NRM PARAM NOF/U: HCPCS | Performed by: FAMILY MEDICINE

## 2020-10-06 PROCEDURE — G8432 DEP SCR NOT DOC, RNG: HCPCS | Performed by: FAMILY MEDICINE

## 2020-10-06 PROCEDURE — G8536 NO DOC ELDER MAL SCRN: HCPCS | Performed by: FAMILY MEDICINE

## 2020-10-06 NOTE — PATIENT INSTRUCTIONS
Medicare Wellness Visit, Male The best way to live healthy is to have a lifestyle where you eat a well-balanced diet, exercise regularly, limit alcohol use, and quit all forms of tobacco/nicotine, if applicable. Regular preventive services are another way to keep healthy. Preventive services (vaccines, screening tests, monitoring & exams) can help personalize your care plan, which helps you manage your own care. Screening tests can find health problems at the earliest stages, when they are easiest to treat. [facility] follows the current, evidence-based guidelines published by the [facility] Loy ([facility]) when recommending preventive services for our patients. Because we follow these guidelines, sometimes recommendations change over time as research supports it. (For example, a prostate screening blood test is no longer routinely recommended for men with no symptoms). Of course, you and your doctor may decide to screen more often for some diseases, based on your risk and co-morbidities (chronic disease you are already diagnosed with). Preventive services for you include: - Medicare offers their members a free annual wellness visit, which is time for you and your primary care provider to discuss and plan for your preventive service needs. Take advantage of this benefit every year! 
-All adults over age 72 should receive the recommended pneumonia vaccines. Current USPSTF guidelines recommend a series of two vaccines for the best pneumonia protection.  
-All adults should have a flu vaccine yearly and tetanus vaccine every 10 years. 
-All adults age 48 and older should receive the shingles vaccines (series of two vaccines).       
-All adults age 38-68 who are overweight should have a diabetes screening test once every three years.  
-Other screening tests & preventive services for persons with diabetes include: an eye exam to screen for diabetic retinopathy, a kidney function test, a foot exam, and stricter control over your cholesterol.  
-Cardiovascular screening for adults with routine risk involves an electrocardiogram (ECG) at intervals determined by the provider.  
-Colorectal cancer screening should be done for adults age 54-65 with no increased risk factors for colorectal cancer. There are a number of acceptable methods of screening for this type of cancer. Each test has its own benefits and drawbacks. Discuss with your provider what is most appropriate for you during your annual wellness visit. The different tests include: colonoscopy (considered the best screening method), a fecal occult blood test, a fecal DNA test, and sigmoidoscopy. 
-All adults born between [facility][1] should be screened once for Hepatitis C. 
-An Abdominal Aortic Aneurysm (AAA) Screening is recommended for men age 73-68 who has ever smoked in their lifetime. Here is a list of your current Health Maintenance items (your personalized list of preventive services) with a due date: 
Health Maintenance Due Topic Date Due  Shingles Vaccine (1 of 2) 11/23/2004  Glaucoma Screening   11/23/2019  AAA Screening  11/23/2019  Pneumococcal Vaccine (1 of 1 - PPSV23) 11/23/2019  Yearly Flu Vaccine (1) 09/01/2020

## 2020-10-06 NOTE — PROGRESS NOTES
This is a Welcome to [facility] Exam (AWV) (Performed 12 months after IPPE or effective date of Medicare Part B enrollment, Once in a lifetime)    I have reviewed the patient's medical history in detail and updated the computerized patient record. Pt has been well; He is due for labs. History     Patient Active Problem List   Diagnosis Code    GERD (gastroesophageal reflux disease) K21.9    Seasonal allergic rhinitis J30.2    Hemorrhoids, internal K64.8    Polyp of colon K63.5    Enlarged prostate N40.0    Dyslipidemia E78.5    MCKENNA (obstructive sleep apnea) G47.33    History of TIA (transient ischemic attack) Z86.73    Benign prostatic hyperplasia with urinary frequency N40.1, R35.0     Past Medical History:   Diagnosis Date    Dyslipidemia 2/15/2012    GERD (gastroesophageal reflux disease)     Hiatal hernia     IBS (irritable bowel syndrome)     mild     MCKENNA (obstructive sleep apnea) 2/15/2012    does not use cpap machine, oral device only    Seasonal allergies     Transient ischemic attack 2/15/2012      Past Surgical History:   Procedure Laterality Date    HX GI  2000's    Fistulatomy    HX TONSILLECTOMY  30 years ago     Current Outpatient Medications   Medication Sig Dispense Refill    clopidogreL (PLAVIX) 75 mg tab Take 1 Tab by mouth daily. 90 Tab 1    simvastatin (ZOCOR) 40 mg tablet Take 1 Tab by mouth nightly. 90 Tab 1    loratadine (CLARITIN) 10 mg tablet Take 1 Tab by mouth daily as needed for Allergies. 30 Tab 2    famotidine (PEPCID) 20 mg tablet Take 1 Tab by mouth two (2) times a day. 60 Tab 3    Triamcinolone Acetonide (NASACORT AQ) 55 mcg/Actuation Aero nasal spray 2 Sprays by Both Nostrils route daily as needed (nasal congestion). 3 Container 3     Allergies   Allergen Reactions    Bactrim [Sulfamethoprim Ds] Rash    Nexium [Esomeprazole Magnesium] Other (comments)     Anxiety    Relafen [Nabumetone] Rash       No family history on file.   Social History     Tobacco Use    Smoking status: Former Smoker     Packs/day: 1.00     Years: 30.00     Pack years: 30.00     Last attempt to quit: 2012     Years since quittin.8    Smokeless tobacco: Former User    Tobacco comment: quit smoking in , e-ciggerate (vapor)   Substance Use Topics    Alcohol use: Yes     Comment: 12 pack per week       Depression Risk Factor Screening:     3 most recent PHQ Screens 3/6/2020   Little interest or pleasure in doing things Not at all   Feeling down, depressed, irritable, or hopeless Not at all   Total Score PHQ 2 0   Trouble falling or staying asleep, or sleeping too much -   Feeling tired or having little energy -   Poor appetite, weight loss, or overeating -   Feeling bad about yourself - or that you are a failure or have let yourself or your family down -   Trouble concentrating on things such as school, work, reading, or watching TV -   Moving or speaking so slowly that other people could have noticed; or the opposite being so fidgety that others notice -   Thoughts of being better off dead, or hurting yourself in some way -   PHQ 9 Score -   How difficult have these problems made it for you to do your work, take care of your home and get along with others -       Alcohol Risk Screen   Do you average more than 1 drink per night or more than 7 drinks a week: yes, in summer    In the past three months have you have had more than 4 drinks containing alcohol on one occasion: Yes        Functional Ability and Level of Safety:   Hearing: Hearing is good. some mild diminished hearing. Activities of Daily Living: The home contains: no safety equipment. Patient does total self care     Ambulation: with no difficulty      Fall Risk:  Fall Risk Assessment, last 12 mths 2019   Able to walk? Yes   Fall in past 12 months?  No     Abuse Screen:  Patient is not abused       Cognitive Screening   Has your family/caregiver stated any concerns about your memory: no     Cognitive Screening: cognition is intact    Patient Care Team   Patient Care Team:  Char Hagen NP as PCP - General (Family Medicine)    Assessment/Plan   Education and counseling provided:  Are appropriate based on today's review and evaluation    Diagnoses and all orders for this visit:    1. Welcome to Medicare preventive visit    2. Tobacco use  -     US EXAM SCREENING AAA; Future    3. Benign prostatic hyperplasia with urinary frequency  -     PSA W/ REFLX FREE PSA; Future    4. Dyslipidemia  -     LIPID PANEL; Future  -     METABOLIC PANEL, COMPREHENSIVE; Future  -     CBC WITH AUTOMATED DIFF; Future         Health Maintenance Due   Topic Date Due    Shingrix Vaccine Age 49> (1 of 2) 11/23/2004    GLAUCOMA SCREENING Q2Y  11/23/2019    AAA Screening 73-69 YO Male Smoking Patients  11/23/2019    Pneumococcal 65+ years (1 of 1 - PPSV23) 11/23/2019    Flu Vaccine (1) 09/01/2020         Roopa Sánchez, who was evaluated through a synchronous (real-time) audio only encounter, and/or his healthcare decision maker, is aware that it is a billable service, with coverage as determined by his insurance carrier. He provided verbal consent to proceed: Yes, and patient identification was verified. It was conducted pursuant to the emergency declaration under the [facility], [address] [place] [address] authority and the [employer] General Act. A caregiver was present when appropriate. Ability to conduct physical exam was limited. I was in the office. The patient was at home.       Miquel James MD

## 2020-10-13 ENCOUNTER — HOSPITAL ENCOUNTER (OUTPATIENT)
Dept: ULTRASOUND IMAGING | Age: 65
Discharge: HOME | End: 2020-10-13
Attending: FAMILY MEDICINE
Payer: MEDICARE

## 2020-10-13 DIAGNOSIS — Z72.0 TOBACCO USE: ICD-10-CM

## 2020-10-13 PROCEDURE — 76706 US ABDL AORTA SCREEN AAA: CPT

## 2020-10-15 ENCOUNTER — HOSPITAL ENCOUNTER (OUTPATIENT)
Dept: LAB | Age: 65
Discharge: HOME | End: 2020-10-15
Payer: MEDICARE

## 2020-10-15 ENCOUNTER — APPOINTMENT (OUTPATIENT)
Dept: FAMILY MEDICINE CLINIC | Age: 65
End: 2020-10-15

## 2020-10-15 DIAGNOSIS — N40.1 BENIGN PROSTATIC HYPERPLASIA WITH URINARY FREQUENCY: ICD-10-CM

## 2020-10-15 DIAGNOSIS — R35.0 BENIGN PROSTATIC HYPERPLASIA WITH URINARY FREQUENCY: ICD-10-CM

## 2020-10-15 DIAGNOSIS — E78.5 DYSLIPIDEMIA: ICD-10-CM

## 2020-10-15 LAB
ALBUMIN SERPL-MCNC: 3.9 G/DL (ref 3.4–5)
ALBUMIN/GLOB SERPL: 1.3 {RATIO} (ref 0.8–1.7)
ALP SERPL-CCNC: 73 U/L (ref 45–117)
ALT SERPL-CCNC: 34 U/L (ref 16–61)
ANION GAP SERPL CALC-SCNC: 7 MMOL/L (ref 3–18)
AST SERPL-CCNC: 28 U/L (ref 10–38)
BASOPHILS # BLD: 0 K/UL (ref 0–0.1)
BASOPHILS NFR BLD: 0 % (ref 0–2)
BILIRUB SERPL-MCNC: 0.6 MG/DL (ref 0.2–1)
BUN SERPL-MCNC: 10 MG/DL (ref 7–18)
BUN/CREAT SERPL: 9 (ref 12–20)
CALCIUM SERPL-MCNC: 9.2 MG/DL (ref 8.5–10.1)
CHLORIDE SERPL-SCNC: 107 MMOL/L (ref 100–111)
CHOLEST SERPL-MCNC: 144 MG/DL
CO2 SERPL-SCNC: 27 MMOL/L (ref 21–32)
CREAT SERPL-MCNC: 1.06 MG/DL (ref 0.6–1.3)
DIFFERENTIAL METHOD BLD: ABNORMAL
EOSINOPHIL # BLD: 0.3 K/UL (ref 0–0.4)
EOSINOPHIL NFR BLD: 3 % (ref 0–5)
ERYTHROCYTE [DISTWIDTH] IN BLOOD BY AUTOMATED COUNT: 13.1 % (ref 11.6–14.5)
GLOBULIN SER CALC-MCNC: 3.1 G/DL (ref 2–4)
GLUCOSE SERPL-MCNC: 100 MG/DL (ref 74–99)
HCT VFR BLD AUTO: 48.9 % (ref 36–48)
HDLC SERPL-MCNC: 94 MG/DL (ref 40–60)
HDLC SERPL: 1.5 {RATIO} (ref 0–5)
HGB BLD-MCNC: 16.3 G/DL (ref 13–16)
LDLC SERPL CALC-MCNC: 38 MG/DL (ref 0–100)
LIPID PROFILE,FLP: ABNORMAL
LYMPHOCYTES # BLD: 2.8 K/UL (ref 0.9–3.6)
LYMPHOCYTES NFR BLD: 27 % (ref 21–52)
MCH RBC QN AUTO: 33.3 PG (ref 24–34)
MCHC RBC AUTO-ENTMCNC: 33.3 G/DL (ref 31–37)
MCV RBC AUTO: 99.8 FL (ref 74–97)
MONOCYTES # BLD: 1.1 K/UL (ref 0.05–1.2)
MONOCYTES NFR BLD: 11 % (ref 3–10)
NEUTS SEG # BLD: 6.1 K/UL (ref 1.8–8)
NEUTS SEG NFR BLD: 59 % (ref 40–73)
PLATELET # BLD AUTO: 302 K/UL (ref 135–420)
PMV BLD AUTO: 11 FL (ref 9.2–11.8)
POTASSIUM SERPL-SCNC: 4.4 MMOL/L (ref 3.5–5.5)
PROT SERPL-MCNC: 7 G/DL (ref 6.4–8.2)
RBC # BLD AUTO: 4.9 M/UL (ref 4.7–5.5)
SODIUM SERPL-SCNC: 141 MMOL/L (ref 136–145)
TRIGL SERPL-MCNC: 60 MG/DL (ref ?–150)
VLDLC SERPL CALC-MCNC: 12 MG/DL
WBC # BLD AUTO: 10.4 K/UL (ref 4.6–13.2)

## 2020-10-15 PROCEDURE — 80053 COMPREHEN METABOLIC PANEL: CPT

## 2020-10-15 PROCEDURE — 80061 LIPID PANEL: CPT

## 2020-10-15 PROCEDURE — 84153 ASSAY OF PSA TOTAL: CPT

## 2020-10-15 PROCEDURE — 85025 COMPLETE CBC W/AUTO DIFF WBC: CPT

## 2020-10-15 PROCEDURE — 36415 COLL VENOUS BLD VENIPUNCTURE: CPT

## 2020-10-16 LAB
PSA SERPL-MCNC: 1.4 NG/ML (ref 0–4)
REFLEX CRITERIA: NORMAL

## 2021-02-22 DIAGNOSIS — E78.5 DYSLIPIDEMIA: ICD-10-CM

## 2021-02-22 DIAGNOSIS — Z86.73 HISTORY OF TIA (TRANSIENT ISCHEMIC ATTACK): ICD-10-CM

## 2021-02-26 RX ORDER — CLOPIDOGREL BISULFATE 75 MG/1
TABLET ORAL
Qty: 90 TAB | Refills: 3 | Status: SHIPPED | OUTPATIENT
Start: 2021-02-26 | End: 2022-02-18

## 2021-02-26 RX ORDER — SIMVASTATIN 40 MG/1
TABLET, FILM COATED ORAL
Qty: 90 TAB | Refills: 3 | Status: SHIPPED | OUTPATIENT
Start: 2021-02-26 | End: 2022-02-18

## 2021-10-07 ENCOUNTER — TRANSCRIBE ORDER (OUTPATIENT)
Dept: SCHEDULING | Age: 66
End: 2021-10-07

## 2021-10-07 DIAGNOSIS — E04.2 NONTOXIC MULTINODULAR GOITER: Primary | ICD-10-CM

## 2021-10-07 DIAGNOSIS — R22.1 LOCALIZED SWELLING, MASS AND LUMP, NECK: Primary | ICD-10-CM

## 2021-10-14 ENCOUNTER — HOSPITAL ENCOUNTER (OUTPATIENT)
Dept: ULTRASOUND IMAGING | Age: 66
Discharge: HOME | End: 2021-10-14
Attending: OTOLARYNGOLOGY
Payer: MEDICARE

## 2021-10-14 ENCOUNTER — HOSPITAL ENCOUNTER (OUTPATIENT)
Dept: CT IMAGING | Age: 66
Discharge: HOME | End: 2021-10-14
Attending: OTOLARYNGOLOGY
Payer: MEDICARE

## 2021-10-14 DIAGNOSIS — E04.2 NONTOXIC MULTINODULAR GOITER: ICD-10-CM

## 2021-10-14 DIAGNOSIS — R22.1 LOCALIZED SWELLING, MASS AND LUMP, NECK: ICD-10-CM

## 2021-10-14 LAB — CREAT UR-MCNC: 0.9 MG/DL (ref 0.6–1.3)

## 2021-10-14 PROCEDURE — 82565 ASSAY OF CREATININE: CPT

## 2021-10-14 PROCEDURE — 74011000636 HC RX REV CODE- 636: Performed by: OTOLARYNGOLOGY

## 2021-10-14 PROCEDURE — 70491 CT SOFT TISSUE NECK W/DYE: CPT

## 2021-10-14 PROCEDURE — 76536 US EXAM OF HEAD AND NECK: CPT

## 2021-10-14 RX ADMIN — IOPAMIDOL 75 ML: 612 INJECTION, SOLUTION INTRAVENOUS at 11:34

## 2021-12-13 ENCOUNTER — OFFICE VISIT (OUTPATIENT)
Dept: FAMILY MEDICINE CLINIC | Age: 66
End: 2021-12-13
Payer: MEDICARE

## 2021-12-13 VITALS
DIASTOLIC BLOOD PRESSURE: 85 MMHG | WEIGHT: 189.2 LBS | HEIGHT: 70 IN | SYSTOLIC BLOOD PRESSURE: 132 MMHG | OXYGEN SATURATION: 96 % | BODY MASS INDEX: 27.09 KG/M2 | TEMPERATURE: 97.1 F | HEART RATE: 98 BPM | RESPIRATION RATE: 12 BRPM

## 2021-12-13 DIAGNOSIS — N40.1 BENIGN PROSTATIC HYPERPLASIA WITH URINARY FREQUENCY: ICD-10-CM

## 2021-12-13 DIAGNOSIS — R73.9 ELEVATED BLOOD SUGAR: ICD-10-CM

## 2021-12-13 DIAGNOSIS — Z00.00 MEDICARE ANNUAL WELLNESS VISIT, SUBSEQUENT: Primary | ICD-10-CM

## 2021-12-13 DIAGNOSIS — R35.0 BENIGN PROSTATIC HYPERPLASIA WITH URINARY FREQUENCY: ICD-10-CM

## 2021-12-13 DIAGNOSIS — Z86.19 HISTORY OF HEPATITIS C: ICD-10-CM

## 2021-12-13 DIAGNOSIS — E78.5 DYSLIPIDEMIA: ICD-10-CM

## 2021-12-13 PROCEDURE — G8536 NO DOC ELDER MAL SCRN: HCPCS | Performed by: FAMILY MEDICINE

## 2021-12-13 PROCEDURE — G8427 DOCREV CUR MEDS BY ELIG CLIN: HCPCS | Performed by: FAMILY MEDICINE

## 2021-12-13 PROCEDURE — 1101F PT FALLS ASSESS-DOCD LE1/YR: CPT | Performed by: FAMILY MEDICINE

## 2021-12-13 PROCEDURE — 3017F COLORECTAL CA SCREEN DOC REV: CPT | Performed by: FAMILY MEDICINE

## 2021-12-13 PROCEDURE — G8510 SCR DEP NEG, NO PLAN REQD: HCPCS | Performed by: FAMILY MEDICINE

## 2021-12-13 PROCEDURE — G8419 CALC BMI OUT NRM PARAM NOF/U: HCPCS | Performed by: FAMILY MEDICINE

## 2021-12-13 PROCEDURE — G0439 PPPS, SUBSEQ VISIT: HCPCS | Performed by: FAMILY MEDICINE

## 2021-12-13 NOTE — PATIENT INSTRUCTIONS
Medicare Wellness Visit, Male    The best way to live healthy is to have a lifestyle where you eat a well-balanced diet, exercise regularly, limit alcohol use, and quit all forms of tobacco/nicotine, if applicable. Regular preventive services are another way to keep healthy. Preventive services (vaccines, screening tests, monitoring & exams) can help personalize your care plan, which helps you manage your own care. Screening tests can find health problems at the earliest stages, when they are easiest to treat. [facility] follows the current, evidence-based guidelines published by the [facility] Loy ([facility]) when recommending preventive services for our patients. Because we follow these guidelines, sometimes recommendations change over time as research supports it. (For example, a prostate screening blood test is no longer routinely recommended for men with no symptoms). Of course, you and your doctor may decide to screen more often for some diseases, based on your risk and co-morbidities (chronic disease you are already diagnosed with). Preventive services for you include:  - Medicare offers their members a free annual wellness visit, which is time for you and your primary care provider to discuss and plan for your preventive service needs. Take advantage of this benefit every year!  -All adults over age 72 should receive the recommended pneumonia vaccines. Current USPSTF guidelines recommend a series of two vaccines for the best pneumonia protection.   -All adults should have a flu vaccine yearly and tetanus vaccine every 10 years.  -All adults age 48 and older should receive the shingles vaccines (series of two vaccines).        -All adults age 38-68 who are overweight should have a diabetes screening test once every three years.   -Other screening tests & preventive services for persons with diabetes include: an eye exam to screen for diabetic retinopathy, a kidney function test, a foot exam, and stricter control over your cholesterol.   -Cardiovascular screening for adults with routine risk involves an electrocardiogram (ECG) at intervals determined by the provider.   -Colorectal cancer screening should be done for adults age 54-65 with no increased risk factors for colorectal cancer. There are a number of acceptable methods of screening for this type of cancer. Each test has its own benefits and drawbacks. Discuss with your provider what is most appropriate for you during your annual wellness visit. The different tests include: colonoscopy (considered the best screening method), a fecal occult blood test, a fecal DNA test, and sigmoidoscopy.  -All adults born between [facility] should be screened once for Hepatitis C.  -An Abdominal Aortic Aneurysm (AAA) Screening is recommended for men age 73-68 who has ever smoked in their lifetime.      Here is a list of your current Health Maintenance items (your personalized list of preventive services) with a due date:  Health Maintenance Due   Topic Date Due    COVID-19 Vaccine (1) Never done    Shingles Vaccine (1 of 2) Never done    Pneumococcal Vaccine (1 of 1 - PPSV23) Never done    Cholesterol Test   10/15/2021

## 2021-12-13 NOTE — PROGRESS NOTES
This is an Initial Medicare Annual Wellness Exam (AWV) (Performed 12 months after IPPE or effective date of Medicare Part B enrollment, Once in a lifetime)    I have reviewed the patient's medical history in detail and updated the computerized patient record. Here to also follow up on chol. No changes to the health history. Assessment/Plan   Education and counseling provided:  Are appropriate based on today's review and evaluation  End-of-Life planning (with patient's consent)    1. Medicare annual wellness visit, subsequent  2. Dyslipidemia  -     LIPID PANEL; Future  -     METABOLIC PANEL, COMPREHENSIVE; Future  -     LIPID PANEL; Future  -     METABOLIC PANEL, COMPREHENSIVE; Future  3. Benign prostatic hyperplasia with urinary frequency  -     PSA W/ REFLX FREE PSA; Future  4. History of hepatitis C  -     HEPATITIS PANEL, ACUTE; Future  -     CBC WITH AUTOMATED DIFF; Future      As above  Patient is stable  Labs as ordered  Follow-up and Dispositions    · Return in about 6 months (around 6/13/2022) for Chol. This has been fully explained to the patient, who indicates understanding. An After Visit Summary was printed and given to the patient.     Depression Risk Factor Screening     3 most recent PHQ Screens 12/13/2021   PHQ Not Done Patient refuses   Little interest or pleasure in doing things Not at all   Feeling down, depressed, irritable, or hopeless Not at all   Total Score PHQ 2 0   Trouble falling or staying asleep, or sleeping too much -   Feeling tired or having little energy -   Poor appetite, weight loss, or overeating -   Feeling bad about yourself - or that you are a failure or have let yourself or your family down -   Trouble concentrating on things such as school, work, reading, or watching TV -   Moving or speaking so slowly that other people could have noticed; or the opposite being so fidgety that others notice -   Thoughts of being better off dead, or hurting yourself in some way -   PHQ 9 Score -   How difficult have these problems made it for you to do your work, take care of your home and get along with others -       Alcohol Risk Screen    Do you average more than 1 drink per night or more than 7 drinks a week: No    In the past three months have you have had more than 4 drinks containing alcohol on one occasion: No         Functional Ability and Level of Safety    Hearing: Hearing is good. Sometimes good , sometimes bad. Activities of Daily Living: The home contains: no safety equipment. Patient does total self care     Ambulation: with no difficulty      Fall Risk:  Fall Risk Assessment, last 12 mths 12/13/2021   Able to walk? Yes   Fall in past 12 months? 0   Do you feel unsteady?  0   Are you worried about falling 0      Abuse Screen:  Patient is not abused       Cognitive Screening    Has your family/caregiver stated any concerns about your memory: no     Cognitive Screening: cognition is intact    Health Maintenance Due     Health Maintenance Due   Topic Date Due    COVID-19 Vaccine (1) Never done    Shingrix Vaccine Age 50> (1 of 2) Never done    Pneumococcal 65+ years (1 of 1 - PPSV23) Never done    Lipid Screen  10/15/2021       Patient Care Team   Patient Care Team:  Julian Haskins MD as PCP - General (Family Medicine)  Julian Haskins MD as PCP - Rafael Reece Provider    History     Patient Active Problem List   Diagnosis Code    GERD (gastroesophageal reflux disease) K21.9    Seasonal allergic rhinitis J30.2    Hemorrhoids, internal K64.8    Polyp of colon K63.5    Enlarged prostate N40.0    Dyslipidemia E78.5    MCKENNA (obstructive sleep apnea) G47.33    History of TIA (transient ischemic attack) Z86.73    Benign prostatic hyperplasia with urinary frequency N40.1, R35.0     Past Medical History:   Diagnosis Date    Dyslipidemia 2/15/2012    GERD (gastroesophageal reflux disease)     Hiatal hernia     IBS (irritable bowel syndrome) mild     MCKENNA (obstructive sleep apnea) 2/15/2012    does not use cpap machine, oral device only    Seasonal allergies     Transient ischemic attack 2/15/2012      Past Surgical History:   Procedure Laterality Date    HX GI  2000's    Fistulatomy    HX TONSILLECTOMY  30 years ago     Current Outpatient Medications   Medication Sig Dispense Refill    simvastatin (ZOCOR) 40 mg tablet TAKE 1 TABLET NIGHTLY 90 Tab 3    clopidogreL (PLAVIX) 75 mg tab TAKE 1 TABLET DAILY 90 Tab 3    loratadine (CLARITIN) 10 mg tablet Take 1 Tab by mouth daily as needed for Allergies. 30 Tab 2    famotidine (PEPCID) 20 mg tablet Take 1 Tab by mouth two (2) times a day. 60 Tab 3    Triamcinolone Acetonide (NASACORT AQ) 55 mcg/Actuation Aero nasal spray 2 Sprays by Both Nostrils route daily as needed (nasal congestion). 3 Container 3     Allergies   Allergen Reactions    Bactrim [Sulfamethoprim Ds] Rash    Nexium [Esomeprazole Magnesium] Other (comments)     Anxiety    Relafen [Nabumetone] Rash       History reviewed. No pertinent family history.   Social History     Tobacco Use    Smoking status: Former Smoker     Packs/day: 1.00     Years: 30.00     Pack years: 30.00     Quit date: 2012     Years since quittin.0    Smokeless tobacco: Former User    Tobacco comment: quit smoking in , e-ciggerate (vapor)   Substance Use Topics    Alcohol use: Yes     Comment: 12 pack per week       Dwayne Epstein

## 2021-12-13 NOTE — PROGRESS NOTES
1. \"Have you been to the ER, urgent care clinic since your last visit? Hospitalized since your last visit? \" No    2. \"Have you seen or consulted any other health care providers outside of the [address] [place] [address] since your last visit? \" No     3. For patients aged 39-70: Has the patient had a colonoscopy / FIT/ Cologuard?  Yes, HM satisfied with blue hyperlink

## 2021-12-15 ENCOUNTER — HOSPITAL ENCOUNTER (OUTPATIENT)
Dept: LAB | Age: 66
Discharge: HOME | End: 2021-12-15
Payer: MEDICARE

## 2021-12-15 DIAGNOSIS — R35.0 BENIGN PROSTATIC HYPERPLASIA WITH URINARY FREQUENCY: ICD-10-CM

## 2021-12-15 DIAGNOSIS — Z86.19 HISTORY OF HEPATITIS C: ICD-10-CM

## 2021-12-15 DIAGNOSIS — E78.5 DYSLIPIDEMIA: ICD-10-CM

## 2021-12-15 DIAGNOSIS — N40.1 BENIGN PROSTATIC HYPERPLASIA WITH URINARY FREQUENCY: ICD-10-CM

## 2021-12-15 LAB
ALBUMIN SERPL-MCNC: 4 G/DL (ref 3.4–5)
ALBUMIN/GLOB SERPL: 1.3 {RATIO} (ref 0.8–1.7)
ALP SERPL-CCNC: 75 U/L (ref 45–117)
ALT SERPL-CCNC: 30 U/L (ref 16–61)
ANION GAP SERPL CALC-SCNC: 3 MMOL/L (ref 3–18)
AST SERPL-CCNC: 23 U/L (ref 10–38)
BASOPHILS # BLD: 0.1 K/UL (ref 0–0.1)
BASOPHILS NFR BLD: 1 % (ref 0–2)
BILIRUB SERPL-MCNC: 0.7 MG/DL (ref 0.2–1)
BUN SERPL-MCNC: 11 MG/DL (ref 7–18)
BUN/CREAT SERPL: 10 (ref 12–20)
CALCIUM SERPL-MCNC: 8.8 MG/DL (ref 8.5–10.1)
CHLORIDE SERPL-SCNC: 108 MMOL/L (ref 100–111)
CHOLEST SERPL-MCNC: 144 MG/DL
CO2 SERPL-SCNC: 30 MMOL/L (ref 21–32)
CREAT SERPL-MCNC: 1.07 MG/DL (ref 0.6–1.3)
DIFFERENTIAL METHOD BLD: ABNORMAL
EOSINOPHIL # BLD: 0.4 K/UL (ref 0–0.4)
EOSINOPHIL NFR BLD: 4 % (ref 0–5)
ERYTHROCYTE [DISTWIDTH] IN BLOOD BY AUTOMATED COUNT: 12.4 % (ref 11.6–14.5)
GLOBULIN SER CALC-MCNC: 3 G/DL (ref 2–4)
GLUCOSE SERPL-MCNC: 118 MG/DL (ref 74–99)
HAV IGM SER QL: NEGATIVE
HBV CORE IGM SER QL: NEGATIVE
HBV SURFACE AG SER QL: <0.1 INDEX
HBV SURFACE AG SER QL: NEGATIVE
HCT VFR BLD AUTO: 48.1 % (ref 36–48)
HCV AB SER IA-ACNC: >11 INDEX
HCV AB SERPL QL IA: POSITIVE
HCV COMMENT,HCGAC: ABNORMAL
HDLC SERPL-MCNC: 72 MG/DL (ref 40–60)
HDLC SERPL: 2 {RATIO} (ref 0–5)
HGB BLD-MCNC: 15.6 G/DL (ref 13–16)
IMM GRANULOCYTES # BLD AUTO: 0 K/UL (ref 0–0.04)
IMM GRANULOCYTES NFR BLD AUTO: 1 % (ref 0–0.5)
LDLC SERPL CALC-MCNC: 58 MG/DL (ref 0–100)
LIPID PROFILE,FLP: ABNORMAL
LYMPHOCYTES # BLD: 2.6 K/UL (ref 0.9–3.6)
LYMPHOCYTES NFR BLD: 29 % (ref 21–52)
MCH RBC QN AUTO: 32 PG (ref 24–34)
MCHC RBC AUTO-ENTMCNC: 32.4 G/DL (ref 31–37)
MCV RBC AUTO: 98.8 FL (ref 78–100)
MONOCYTES # BLD: 1 K/UL (ref 0.05–1.2)
MONOCYTES NFR BLD: 12 % (ref 3–10)
NEUTS SEG # BLD: 4.7 K/UL (ref 1.8–8)
NEUTS SEG NFR BLD: 54 % (ref 40–73)
NRBC # BLD: 0 K/UL (ref 0–0.01)
NRBC BLD-RTO: 0 PER 100 WBC
PLATELET # BLD AUTO: 247 K/UL (ref 135–420)
PMV BLD AUTO: 10.9 FL (ref 9.2–11.8)
POTASSIUM SERPL-SCNC: 5 MMOL/L (ref 3.5–5.5)
PROT SERPL-MCNC: 7 G/DL (ref 6.4–8.2)
RBC # BLD AUTO: 4.87 M/UL (ref 4.35–5.65)
SODIUM SERPL-SCNC: 141 MMOL/L (ref 136–145)
SP1: ABNORMAL
SP2: ABNORMAL
SP3: ABNORMAL
TRIGL SERPL-MCNC: 70 MG/DL (ref ?–150)
VLDLC SERPL CALC-MCNC: 14 MG/DL
WBC # BLD AUTO: 8.8 K/UL (ref 4.6–13.2)

## 2021-12-15 PROCEDURE — 85025 COMPLETE CBC W/AUTO DIFF WBC: CPT

## 2021-12-15 PROCEDURE — 80074 ACUTE HEPATITIS PANEL: CPT

## 2021-12-15 PROCEDURE — 36415 COLL VENOUS BLD VENIPUNCTURE: CPT

## 2021-12-15 PROCEDURE — 84153 ASSAY OF PSA TOTAL: CPT

## 2021-12-15 PROCEDURE — 80061 LIPID PANEL: CPT

## 2021-12-15 PROCEDURE — 80053 COMPREHEN METABOLIC PANEL: CPT

## 2021-12-16 LAB
PSA SERPL-MCNC: 1.7 NG/ML (ref 0–4)
REFLEX CRITERIA: NORMAL

## 2021-12-29 NOTE — PROGRESS NOTES
Patient is with a known history of hepatitis C followed by GI. Will recommend a lower sugar diet given his elevated blood sugar and plan to check an A1c at next check.   The rest of the labs are essentially stable

## 2022-02-17 DIAGNOSIS — Z86.73 HISTORY OF TIA (TRANSIENT ISCHEMIC ATTACK): ICD-10-CM

## 2022-02-17 DIAGNOSIS — E78.5 DYSLIPIDEMIA: ICD-10-CM

## 2022-02-18 RX ORDER — CLOPIDOGREL BISULFATE 75 MG/1
TABLET ORAL
Qty: 90 TABLET | Refills: 3 | Status: SHIPPED | OUTPATIENT
Start: 2022-02-18

## 2022-02-18 RX ORDER — SIMVASTATIN 40 MG/1
TABLET, FILM COATED ORAL
Qty: 90 TABLET | Refills: 3 | Status: SHIPPED | OUTPATIENT
Start: 2022-02-18

## 2022-03-18 PROBLEM — R35.0 BENIGN PROSTATIC HYPERPLASIA WITH URINARY FREQUENCY: Status: ACTIVE | Noted: 2018-08-07

## 2022-03-18 PROBLEM — N40.1 BENIGN PROSTATIC HYPERPLASIA WITH URINARY FREQUENCY: Status: ACTIVE | Noted: 2018-08-07

## 2022-03-19 PROBLEM — Z86.73 HISTORY OF TIA (TRANSIENT ISCHEMIC ATTACK): Status: ACTIVE | Noted: 2018-04-03

## 2022-06-20 ENCOUNTER — HOSPITAL ENCOUNTER (OUTPATIENT)
Dept: LAB | Age: 67
Discharge: HOME | End: 2022-06-20
Payer: MEDICARE

## 2022-06-20 ENCOUNTER — APPOINTMENT (OUTPATIENT)
Dept: FAMILY MEDICINE CLINIC | Age: 67
End: 2022-06-20

## 2022-06-20 DIAGNOSIS — R73.9 ELEVATED BLOOD SUGAR: ICD-10-CM

## 2022-06-20 DIAGNOSIS — E78.5 DYSLIPIDEMIA: ICD-10-CM

## 2022-06-20 LAB
ALBUMIN SERPL-MCNC: 3.8 G/DL (ref 3.4–5)
ALBUMIN/GLOB SERPL: 1.4 {RATIO} (ref 0.8–1.7)
ALP SERPL-CCNC: 67 U/L (ref 45–117)
ALT SERPL-CCNC: 26 U/L (ref 16–61)
ANION GAP SERPL CALC-SCNC: 2 MMOL/L (ref 3–18)
AST SERPL-CCNC: 20 U/L (ref 10–38)
BILIRUB SERPL-MCNC: 0.6 MG/DL (ref 0.2–1)
BUN SERPL-MCNC: 12 MG/DL (ref 7–18)
BUN/CREAT SERPL: 12 (ref 12–20)
CALCIUM SERPL-MCNC: 8.9 MG/DL (ref 8.5–10.1)
CHLORIDE SERPL-SCNC: 109 MMOL/L (ref 100–111)
CHOLEST SERPL-MCNC: 145 MG/DL
CO2 SERPL-SCNC: 29 MMOL/L (ref 21–32)
CREAT SERPL-MCNC: 1 MG/DL (ref 0.6–1.3)
EST. AVERAGE GLUCOSE BLD GHB EST-MCNC: 103 MG/DL
GLOBULIN SER CALC-MCNC: 2.8 G/DL (ref 2–4)
GLUCOSE SERPL-MCNC: 101 MG/DL (ref 74–99)
HBA1C MFR BLD: 5.2 % (ref 4.2–5.6)
HDLC SERPL-MCNC: 94 MG/DL (ref 40–60)
HDLC SERPL: 1.5 {RATIO} (ref 0–5)
LDLC SERPL CALC-MCNC: 41.6 MG/DL (ref 0–100)
LIPID PROFILE,FLP: ABNORMAL
POTASSIUM SERPL-SCNC: 4.9 MMOL/L (ref 3.5–5.5)
PROT SERPL-MCNC: 6.6 G/DL (ref 6.4–8.2)
SODIUM SERPL-SCNC: 140 MMOL/L (ref 136–145)
TRIGL SERPL-MCNC: 47 MG/DL (ref ?–150)
VLDLC SERPL CALC-MCNC: 9.4 MG/DL

## 2022-06-20 PROCEDURE — 83036 HEMOGLOBIN GLYCOSYLATED A1C: CPT

## 2022-06-20 PROCEDURE — 36415 COLL VENOUS BLD VENIPUNCTURE: CPT

## 2022-06-20 PROCEDURE — 80061 LIPID PANEL: CPT

## 2022-06-20 PROCEDURE — 80053 COMPREHEN METABOLIC PANEL: CPT

## 2022-06-27 ENCOUNTER — OFFICE VISIT (OUTPATIENT)
Dept: FAMILY MEDICINE CLINIC | Age: 67
End: 2022-06-27
Payer: MEDICARE

## 2022-06-27 VITALS
BODY MASS INDEX: 26.03 KG/M2 | DIASTOLIC BLOOD PRESSURE: 78 MMHG | RESPIRATION RATE: 16 BRPM | HEART RATE: 93 BPM | HEIGHT: 70 IN | SYSTOLIC BLOOD PRESSURE: 117 MMHG | TEMPERATURE: 98.3 F | WEIGHT: 181.8 LBS | OXYGEN SATURATION: 96 %

## 2022-06-27 DIAGNOSIS — Z12.5 SCREENING FOR PROSTATE CANCER: ICD-10-CM

## 2022-06-27 DIAGNOSIS — R73.9 ELEVATED BLOOD SUGAR: ICD-10-CM

## 2022-06-27 DIAGNOSIS — E78.5 DYSLIPIDEMIA: Primary | ICD-10-CM

## 2022-06-27 PROCEDURE — 3017F COLORECTAL CA SCREEN DOC REV: CPT | Performed by: FAMILY MEDICINE

## 2022-06-27 PROCEDURE — G8417 CALC BMI ABV UP PARAM F/U: HCPCS | Performed by: FAMILY MEDICINE

## 2022-06-27 PROCEDURE — G8427 DOCREV CUR MEDS BY ELIG CLIN: HCPCS | Performed by: FAMILY MEDICINE

## 2022-06-27 PROCEDURE — 1101F PT FALLS ASSESS-DOCD LE1/YR: CPT | Performed by: FAMILY MEDICINE

## 2022-06-27 PROCEDURE — 1124F ACP DISCUSS-NO DSCNMKR DOCD: CPT | Performed by: FAMILY MEDICINE

## 2022-06-27 PROCEDURE — G8510 SCR DEP NEG, NO PLAN REQD: HCPCS | Performed by: FAMILY MEDICINE

## 2022-06-27 PROCEDURE — G8536 NO DOC ELDER MAL SCRN: HCPCS | Performed by: FAMILY MEDICINE

## 2022-06-27 PROCEDURE — 99213 OFFICE O/P EST LOW 20 MIN: CPT | Performed by: FAMILY MEDICINE

## 2022-06-27 RX ORDER — ASPIRIN 325 MG
325 TABLET ORAL DAILY
COMMUNITY
End: 2022-06-27 | Stop reason: ALTCHOICE

## 2022-06-27 NOTE — PATIENT INSTRUCTIONS
High Cholesterol: Care Instructions  Overview     Cholesterol is a type of fat in your blood. It is needed for many body functions, such as making new cells. Cholesterol is made by your body. It also comes from food you eat. High cholesterol means that you have too much of the fat in your blood. This raises your risk of a heart attack and stroke. LDL and HDL are part of your total cholesterol. LDL is the \"bad\" cholesterol. High LDL can raise your risk for coronary artery disease, heart attack, and stroke. HDL is the \"good\" cholesterol. It helps clear bad cholesterol from the body. High HDL is linked with a lower risk of coronary artery disease, heart attack, and stroke. Your cholesterol levels help your doctor find out your risk for having a heart attack or stroke. You and your doctor can talk about whether you need to lower your risk and what treatment is best for you. Treatment options include a heart-healthy lifestyle and medicine. Both options can help lower your cholesterol and your risk. The way you choose to lower your risk will depend on how high your risk is for heart attack and stroke. It will also depend on how you feel about taking medicines. Follow-up care is a key part of your treatment and safety. Be sure to make and go to all appointments, and call your doctor if you are having problems. It's also a good idea to know your test results and keep a list of the medicines you take. How can you care for yourself at home? · Eat heart-healthy foods. ? Eat fruits, vegetables, whole grains, beans, and other high-fiber foods. ? Eat lean proteins, such as seafood, lean meats, beans, nuts, and soy products. ? Eat healthy fats, such as canola and olive oil. ? Choose foods that are low in saturated fat. ? Limit sodium and alcohol. ? Limit drinks and foods with added sugar. · Be physically active. Try to do moderate activity at least 2½ hours a week.  Or try to do vigorous activity at least 1¼ hours a week. You may want to walk or try other activities, such as running, swimming, cycling, or playing tennis or team sports. · Stay at a healthy weight or lose weight by making the changes in eating and physical activity listed above. Losing just a small amount of weight, even 5 to 10 pounds, can help reduce your risk for having a heart attack or stroke. · Do not smoke. · Manage other health problems. These include diabetes and high blood pressure. If you think you may have a problem with alcohol or drug use, talk to your doctor. · If you take medicine, take it exactly as prescribed. Call your doctor if you think you are having a problem with your medicine. · Check with your doctor or pharmacist before you use any other medicines, including over-the-counter medicines. Make sure your doctor knows all of the medicines, vitamins, herbal products, and supplements you take. Taking some medicines together can cause problems. When should you call for help? Watch closely for changes in your health, and be sure to contact your doctor if:    · You need help making lifestyle changes.     · You have questions about your medicine. Where can you learn more? Go to http://jose d-valorie.info/  Enter E545 in the search box to learn more about \"High Cholesterol: Care Instructions. \"  Current as of: January 10, 2022               Content Version: 13.2  © 2006-2022 [employer]. Care instructions adapted under license by [employer] (which disclaims liability or warranty for this information). If you have questions about a medical condition or this instruction, always ask your healthcare professional. [place] [address] any warranty or liability for your use of this information.

## 2022-06-27 NOTE — PROGRESS NOTES
HPI:  Brandon Gerber is a 78 y.o. male who presents today with   Chief Complaint   Patient presents with    Cholesterol Problem     6 m f/u         Patient is here to follow-up on hypercholesterolemia. Patient continues on simvastatin for his cholesterol. He tolerates medications well. Patient is due for blood work. He has had an elevated blood sugar in the past.  We will check an A1c. Reviewed patient's labs from December. Reviewed interpretation of his hepatitis C antibody results. 3 most recent PHQ Screens 6/27/2022   PHQ Not Done Patient refuses   Little interest or pleasure in doing things Not at all   Feeling down, depressed, irritable, or hopeless Not at all   Total Score PHQ 2 0   Trouble falling or staying asleep, or sleeping too much -   Feeling tired or having little energy -   Poor appetite, weight loss, or overeating -   Feeling bad about yourself - or that you are a failure or have let yourself or your family down -   Trouble concentrating on things such as school, work, reading, or watching TV -   Moving or speaking so slowly that other people could have noticed; or the opposite being so fidgety that others notice -   Thoughts of being better off dead, or hurting yourself in some way -   PHQ 9 Score -   How difficult have these problems made it for you to do your work, take care of your home and get along with others -               PMH,  Meds, Allergies, Family History, Social history reviewed      Current Outpatient Medications   Medication Sig Dispense Refill    clopidogreL (PLAVIX) 75 mg tab TAKE 1 TABLET DAILY 90 Tablet 3    simvastatin (ZOCOR) 40 mg tablet TAKE 1 TABLET NIGHTLY 90 Tablet 3    loratadine (CLARITIN) 10 mg tablet Take 1 Tab by mouth daily as needed for Allergies. 30 Tab 2    famotidine (PEPCID) 20 mg tablet Take 1 Tab by mouth two (2) times a day.  60 Tab 3    Triamcinolone Acetonide (NASACORT AQ) 55 mcg/Actuation Aero nasal spray 2 Sprays by Both Nostrils route daily as needed (nasal congestion). 3 Container 3        Allergies   Allergen Reactions    Bactrim [Sulfamethoprim Ds] Rash    Nexium [Esomeprazole Magnesium] Other (comments)     Anxiety    Relafen [Nabumetone] Rash                  ROS   As per HPI        Visit Vitals  /78 (BP 1 Location: Right arm, BP Patient Position: Sitting, BP Cuff Size: Large adult)   Pulse 93   Temp 98.3 °F (36.8 °C) (Temporal)   Resp 16   Ht 5' 10\" (1.778 m)   Wt 181 lb 12.8 oz (82.5 kg)   SpO2 96%   BMI 26.09 kg/m²     Physical Exam     General appearance: alert, cooperative, no distress, appears stated age  Neck: supple, symmetrical, trachea midline, no adenopathy, thyroid: not enlarged, symmetric, no tenderness/mass/nodules, no carotid bruit and no JVD  Lungs: clear to auscultation bilaterally  Heart: regular rate and rhythm, S1, S2 normal, no murmur, click, rub or gallop    Extremities: extremities normal, atraumatic, no cyanosis or edema      Lab Results   Component Value Date/Time    Cholesterol, total 145 06/20/2022 09:32 AM    HDL Cholesterol 94 (H) 06/20/2022 09:32 AM    LDL, calculated 41.6 06/20/2022 09:32 AM    VLDL, calculated 9.4 06/20/2022 09:32 AM    Triglyceride 47 06/20/2022 09:32 AM    CHOL/HDL Ratio 1.5 06/20/2022 09:32 AM     Lab Results   Component Value Date/Time    Sodium 140 06/20/2022 09:32 AM    Potassium 4.9 06/20/2022 09:32 AM    Chloride 109 06/20/2022 09:32 AM    CO2 29 06/20/2022 09:32 AM    Anion gap 2 (L) 06/20/2022 09:32 AM    Glucose 101 (H) 06/20/2022 09:32 AM    BUN 12 06/20/2022 09:32 AM    Creatinine 1.00 06/20/2022 09:32 AM    BUN/Creatinine ratio 12 06/20/2022 09:32 AM    GFR est AA >60 06/20/2022 09:32 AM    GFR est non-AA >60 06/20/2022 09:32 AM    Calcium 8.9 06/20/2022 09:32 AM    Bilirubin, total 0.6 06/20/2022 09:32 AM    Alk.  phosphatase 67 06/20/2022 09:32 AM    Protein, total 6.6 06/20/2022 09:32 AM    Albumin 3.8 06/20/2022 09:32 AM    Globulin 2.8 06/20/2022 09:32 AM    A-G Ratio 1.4 06/20/2022 09:32 AM    ALT (SGPT) 26 06/20/2022 09:32 AM    AST (SGOT) 20 06/20/2022 09:32 AM         Assessment/Plan:    Diagnoses and all orders for this visit:    1. Dyslipidemia  -     LIPID PANEL; Future  -     METABOLIC PANEL, COMPREHENSIVE; Future  -     CBC WITH AUTOMATED DIFF; Future    2. Elevated blood sugar  -     HEMOGLOBIN A1C WITH EAG; Future        As above  Patient stable  Labs as ordered ( for prior to next visit)  Follow-up and Dispositions    · Return in about 6 months (around 12/27/2022) for medicare well. This has been fully explained to the patient, who indicates understanding. [facility] and pt has been advised of same. Follow-up and Dispositions    · Return in about 6 months (around 12/27/2022) for medicare well.             Ghulam Tavarez MD

## 2022-06-27 NOTE — PROGRESS NOTES
1. \"Have you been to the ER, urgent care clinic since your last visit? Hospitalized since your last visit? \" Yes, urgent care x4-5months ago for cold symptoms     2. \"Have you seen or consulted any other health care providers outside of the [address] [place] [address] since your last visit? \" No     3. For patients aged 39-70: Has the patient had a colonoscopy / FIT/ Cologuard? Yes - no Care Gap present      If the patient is female:    4. For patients aged 41-77: Has the patient had a mammogram within the past 2 years? NA - based on age or sex      11. For patients aged 21-65: Has the patient had a pap smear?  NA - based on age or sex

## 2022-12-29 ENCOUNTER — HOSPITAL ENCOUNTER (OUTPATIENT)
Dept: LAB | Age: 67
Discharge: HOME | End: 2022-12-29
Payer: MEDICARE

## 2022-12-29 ENCOUNTER — APPOINTMENT (OUTPATIENT)
Dept: FAMILY MEDICINE CLINIC | Age: 67
End: 2022-12-29

## 2022-12-29 DIAGNOSIS — R73.9 ELEVATED BLOOD SUGAR: ICD-10-CM

## 2022-12-29 DIAGNOSIS — Z12.5 SCREENING FOR PROSTATE CANCER: ICD-10-CM

## 2022-12-29 DIAGNOSIS — E78.5 DYSLIPIDEMIA: ICD-10-CM

## 2022-12-29 LAB
ALBUMIN SERPL-MCNC: 3.9 G/DL (ref 3.4–5)
ALBUMIN/GLOB SERPL: 1.3 {RATIO} (ref 0.8–1.7)
ALP SERPL-CCNC: 70 U/L (ref 45–117)
ALT SERPL-CCNC: 39 U/L (ref 16–61)
ANION GAP SERPL CALC-SCNC: 0 MMOL/L (ref 3–18)
AST SERPL-CCNC: 25 U/L (ref 10–38)
BASOPHILS # BLD: 0.1 K/UL (ref 0–0.1)
BASOPHILS NFR BLD: 1 % (ref 0–2)
BILIRUB SERPL-MCNC: 0.7 MG/DL (ref 0.2–1)
BUN SERPL-MCNC: 11 MG/DL (ref 7–18)
BUN/CREAT SERPL: 10 (ref 12–20)
CALCIUM SERPL-MCNC: 9.4 MG/DL (ref 8.5–10.1)
CHLORIDE SERPL-SCNC: 108 MMOL/L (ref 100–111)
CHOLEST SERPL-MCNC: 170 MG/DL
CO2 SERPL-SCNC: 32 MMOL/L (ref 21–32)
CREAT SERPL-MCNC: 1.1 MG/DL (ref 0.6–1.3)
DIFFERENTIAL METHOD BLD: ABNORMAL
EOSINOPHIL # BLD: 0.4 K/UL (ref 0–0.4)
EOSINOPHIL NFR BLD: 4 % (ref 0–5)
ERYTHROCYTE [DISTWIDTH] IN BLOOD BY AUTOMATED COUNT: 12.9 % (ref 11.6–14.5)
EST. AVERAGE GLUCOSE BLD GHB EST-MCNC: 108 MG/DL
GLOBULIN SER CALC-MCNC: 3 G/DL (ref 2–4)
GLUCOSE SERPL-MCNC: 113 MG/DL (ref 74–99)
HBA1C MFR BLD: 5.4 % (ref 4.2–5.6)
HCT VFR BLD AUTO: 47.8 % (ref 36–48)
HDLC SERPL-MCNC: 92 MG/DL (ref 40–60)
HDLC SERPL: 1.8 {RATIO} (ref 0–5)
HGB BLD-MCNC: 15.6 G/DL (ref 13–16)
IMM GRANULOCYTES # BLD AUTO: 0.1 K/UL (ref 0–0.04)
IMM GRANULOCYTES NFR BLD AUTO: 1 % (ref 0–0.5)
LDLC SERPL CALC-MCNC: 65.4 MG/DL (ref 0–100)
LIPID PROFILE,FLP: ABNORMAL
LYMPHOCYTES # BLD: 3.2 K/UL (ref 0.9–3.6)
LYMPHOCYTES NFR BLD: 34 % (ref 21–52)
MCH RBC QN AUTO: 32.6 PG (ref 24–34)
MCHC RBC AUTO-ENTMCNC: 32.6 G/DL (ref 31–37)
MCV RBC AUTO: 100 FL (ref 78–100)
MONOCYTES # BLD: 1 K/UL (ref 0.05–1.2)
MONOCYTES NFR BLD: 10 % (ref 3–10)
NEUTS SEG # BLD: 4.7 K/UL (ref 1.8–8)
NEUTS SEG NFR BLD: 50 % (ref 40–73)
NRBC # BLD: 0 K/UL (ref 0–0.01)
NRBC BLD-RTO: 0 PER 100 WBC
PLATELET # BLD AUTO: 277 K/UL (ref 135–420)
PMV BLD AUTO: 10.7 FL (ref 9.2–11.8)
POTASSIUM SERPL-SCNC: 4.6 MMOL/L (ref 3.5–5.5)
PROT SERPL-MCNC: 6.9 G/DL (ref 6.4–8.2)
RBC # BLD AUTO: 4.78 M/UL (ref 4.35–5.65)
SODIUM SERPL-SCNC: 140 MMOL/L (ref 136–145)
TRIGL SERPL-MCNC: 63 MG/DL (ref ?–150)
VLDLC SERPL CALC-MCNC: 12.6 MG/DL
WBC # BLD AUTO: 9.4 K/UL (ref 4.6–13.2)

## 2022-12-29 PROCEDURE — 80061 LIPID PANEL: CPT

## 2022-12-29 PROCEDURE — 36415 COLL VENOUS BLD VENIPUNCTURE: CPT

## 2022-12-29 PROCEDURE — 84153 ASSAY OF PSA TOTAL: CPT

## 2022-12-29 PROCEDURE — 80053 COMPREHEN METABOLIC PANEL: CPT

## 2022-12-29 PROCEDURE — 83036 HEMOGLOBIN GLYCOSYLATED A1C: CPT

## 2022-12-29 PROCEDURE — 85025 COMPLETE CBC W/AUTO DIFF WBC: CPT

## 2022-12-31 LAB
PSA SERPL-MCNC: 2.2 NG/ML (ref 0–4)
REFLEX CRITERIA: NORMAL

## 2023-01-03 ENCOUNTER — OFFICE VISIT (OUTPATIENT)
Dept: FAMILY MEDICINE CLINIC | Age: 68
End: 2023-01-03
Payer: MEDICARE

## 2023-01-03 VITALS
HEIGHT: 70 IN | DIASTOLIC BLOOD PRESSURE: 77 MMHG | OXYGEN SATURATION: 96 % | RESPIRATION RATE: 12 BRPM | HEART RATE: 100 BPM | WEIGHT: 190.7 LBS | TEMPERATURE: 97.1 F | BODY MASS INDEX: 27.3 KG/M2 | SYSTOLIC BLOOD PRESSURE: 131 MMHG

## 2023-01-03 DIAGNOSIS — R73.9 ELEVATED BLOOD SUGAR: ICD-10-CM

## 2023-01-03 DIAGNOSIS — Z00.00 MEDICARE ANNUAL WELLNESS VISIT, SUBSEQUENT: Primary | ICD-10-CM

## 2023-01-03 DIAGNOSIS — E78.5 DYSLIPIDEMIA: ICD-10-CM

## 2023-01-03 PROCEDURE — G8417 CALC BMI ABV UP PARAM F/U: HCPCS | Performed by: FAMILY MEDICINE

## 2023-01-03 PROCEDURE — G8510 SCR DEP NEG, NO PLAN REQD: HCPCS | Performed by: FAMILY MEDICINE

## 2023-01-03 PROCEDURE — 1124F ACP DISCUSS-NO DSCNMKR DOCD: CPT | Performed by: FAMILY MEDICINE

## 2023-01-03 PROCEDURE — G8536 NO DOC ELDER MAL SCRN: HCPCS | Performed by: FAMILY MEDICINE

## 2023-01-03 PROCEDURE — 1101F PT FALLS ASSESS-DOCD LE1/YR: CPT | Performed by: FAMILY MEDICINE

## 2023-01-03 PROCEDURE — 3017F COLORECTAL CA SCREEN DOC REV: CPT | Performed by: FAMILY MEDICINE

## 2023-01-03 PROCEDURE — G0439 PPPS, SUBSEQ VISIT: HCPCS | Performed by: FAMILY MEDICINE

## 2023-01-03 PROCEDURE — G8427 DOCREV CUR MEDS BY ELIG CLIN: HCPCS | Performed by: FAMILY MEDICINE

## 2023-01-03 RX ORDER — PANTOPRAZOLE SODIUM 40 MG/1
40 TABLET, DELAYED RELEASE ORAL
COMMUNITY
Start: 2022-11-21

## 2023-01-03 NOTE — PROGRESS NOTES
This is the Subsequent Medicare Annual Wellness Exam, performed 12 months or more after the Initial AWV or the last Subsequent AWV    I have reviewed the patient's medical history in detail and updated the computerized patient record. Doing well; Had blood work  Has seen blood test results       Had a recent hearing test where he was told his hearing may have declined. He will bring those results in next time. Lab Results   Component Value Date/Time    Cholesterol, total 170 12/29/2022 11:09 AM    HDL Cholesterol 92 (H) 12/29/2022 11:09 AM    LDL, calculated 65.4 12/29/2022 11:09 AM    VLDL, calculated 12.6 12/29/2022 11:09 AM    Triglyceride 63 12/29/2022 11:09 AM    CHOL/HDL Ratio 1.8 12/29/2022 11:09 AM     Lab Results   Component Value Date/Time    Sodium 140 12/29/2022 11:09 AM    Potassium 4.6 12/29/2022 11:09 AM    Chloride 108 12/29/2022 11:09 AM    CO2 32 12/29/2022 11:09 AM    Anion gap 0 (L) 12/29/2022 11:09 AM    Glucose 113 (H) 12/29/2022 11:09 AM    BUN 11 12/29/2022 11:09 AM    Creatinine 1.10 12/29/2022 11:09 AM    BUN/Creatinine ratio 10 (L) 12/29/2022 11:09 AM    GFR est AA >60 06/20/2022 09:32 AM    GFR est non-AA >60 06/20/2022 09:32 AM    Calcium 9.4 12/29/2022 11:09 AM    Bilirubin, total 0.7 12/29/2022 11:09 AM    Alk.  phosphatase 70 12/29/2022 11:09 AM    Protein, total 6.9 12/29/2022 11:09 AM    Albumin 3.9 12/29/2022 11:09 AM    Globulin 3.0 12/29/2022 11:09 AM    A-G Ratio 1.3 12/29/2022 11:09 AM    ALT (SGPT) 39 12/29/2022 11:09 AM    AST (SGOT) 25 12/29/2022 11:09 AM     Lab Results   Component Value Date/Time    Hemoglobin A1c 5.4 12/29/2022 11:09 AM    Hemoglobin A1c (POC) 5.4 12/05/2017 12:51 PM     Lab Results   Component Value Date/Time    Prostate Specific Ag 2.2 12/29/2022 11:09 AM    Prostate Specific Ag 1.7 12/15/2021 09:31 AM    Prostate Specific Ag 1.4 10/15/2020 11:05 AM    Prostate Specific Ag 0.7 05/26/2017 12:37 PM     Lab Results   Component Value Date/Time    WBC 9.4 12/29/2022 11:09 AM    HGB 15.6 12/29/2022 11:09 AM    HCT 47.8 12/29/2022 11:09 AM    PLATELET 845 75/28/2003 11:09 AM    .0 12/29/2022 11:09 AM           Assessment/Plan   Education and counseling provided:  Are appropriate based on today's review and evaluation  End-of-Life planning (with patient's consent)    1. Medicare annual wellness visit, subsequent  2. Dyslipidemia  3. Elevated blood sugar       As above  Pt stable, well  Reassured about blood sugar and A1c  Advised continued lower carb diet. Will look at patient's hearing test at return visit  Follow-up and Dispositions    Return in about 6 months (around 7/3/2023) for Chol. This has been fully explained to the patient, who indicates understanding. An After Visit Summary was printed and given to the patient.     Depression Risk Factor Screening     3 most recent PHQ Screens 1/3/2023   PHQ Not Done -   Little interest or pleasure in doing things Not at all   Feeling down, depressed, irritable, or hopeless Not at all   Total Score PHQ 2 0   Trouble falling or staying asleep, or sleeping too much -   Feeling tired or having little energy -   Poor appetite, weight loss, or overeating -   Feeling bad about yourself - or that you are a failure or have let yourself or your family down -   Trouble concentrating on things such as school, work, reading, or watching TV -   Moving or speaking so slowly that other people could have noticed; or the opposite being so fidgety that others notice -   Thoughts of being better off dead, or hurting yourself in some way -   PHQ 9 Score -   How difficult have these problems made it for you to do your work, take care of your home and get along with others -       Alcohol & Drug Abuse Risk Screen    Do you average more than 1 drink per night or more than 7 drinks a week: Yes    In the past three months have you have had more than 4 drinks containing alcohol on one occasion: Yes Functional Ability and Level of Safety    Hearing: Hearing is good. Activities of Daily Living: The home contains: no safety equipment. Patient does total self care      Ambulation: with no difficulty     Fall Risk:  Fall Risk Assessment, last 12 mths 1/3/2023   Able to walk? Yes   Fall in past 12 months? 0   Do you feel unsteady?  0   Are you worried about falling 0      Abuse Screen:  Patient is not abused       Cognitive Screening    Has your family/caregiver stated any concerns about your memory: no     Cognitive Screening: cognition is intact    Health Maintenance Due     Health Maintenance Due   Topic Date Due    COVID-19 Vaccine (1) Never done    Shingles Vaccine (1 of 2) Never done    Pneumococcal 65+ years (1 - PCV) Never done    Flu Vaccine (1) 08/01/2022       Patient Care Team   Patient Care Team:  Talon Gannon MD as PCP - General (Family Medicine)  [facility]      PE:     General appearance: alert, cooperative, no distress, appears stated age  Neck: supple, symmetrical, trachea midline, no adenopathy, thyroid: not enlarged, symmetric, no tenderness/mass/nodules, no carotid bruit and no JVD  Lungs: clear to auscultation bilaterally  Heart: regular rate and rhythm, S1, S2 normal, no murmur, click, rub or gallop    Extremities: extremities normal, atraumatic, no cyanosis or edema      History     Patient Active Problem List   Diagnosis Code    GERD (gastroesophageal reflux disease) K21.9    Seasonal allergic rhinitis J30.2    Hemorrhoids, internal K64.8    Polyp of colon K63.5    Enlarged prostate N40.0    Dyslipidemia E78.5    MCKENNA (obstructive sleep apnea) G47.33    History of TIA (transient ischemic attack) Z86.73    Benign prostatic hyperplasia with urinary frequency N40.1, R35.0     Past Medical History:   Diagnosis Date    Dyslipidemia 2/15/2012    GERD (gastroesophageal reflux disease)     Hiatal hernia     IBS (irritable bowel syndrome) mild     MCKENNA (obstructive sleep apnea) 2/15/2012    does not use cpap machine, oral device only    Seasonal allergies     Transient ischemic attack 2/15/2012      Past Surgical History:   Procedure Laterality Date    HX GI  2000's    Fistulatomy    HX TONSILLECTOMY  30 years ago     Current Outpatient Medications   Medication Sig Dispense Refill    pantoprazole (PROTONIX) 40 mg tablet Take 40 mg by mouth Daily (before breakfast). clopidogreL (PLAVIX) 75 mg tab TAKE 1 TABLET DAILY 90 Tablet 3    simvastatin (ZOCOR) 40 mg tablet TAKE 1 TABLET NIGHTLY 90 Tablet 3    loratadine (CLARITIN) 10 mg tablet Take 1 Tab by mouth daily as needed for Allergies. 30 Tab 2    Triamcinolone Acetonide (NASACORT AQ) 55 mcg/Actuation Aero nasal spray 2 Sprays by Both Nostrils route daily as needed (nasal congestion). 3 Container 3    famotidine (PEPCID) 20 mg tablet Take 1 Tab by mouth two (2) times a day. 60 Tab 3     Allergies   Allergen Reactions    Bactrim [Sulfamethoprim Ds] Rash    Nexium [Esomeprazole Magnesium] Other (comments)     Anxiety    Relafen [Nabumetone] Rash       History reviewed. No pertinent family history.   Social History     Tobacco Use    Smoking status: Former     Packs/day: 1.00     Years: 30.00     Pack years: 30.00     Types: Cigarettes     Quit date: 11/20/2012     Years since quitting: 10.1    Smokeless tobacco: Former    Tobacco comments:     quit smoking in 2012, e-ciggerate (vapor)   Substance Use Topics    Alcohol use: Yes     Comment: 12 pack per week         Luigi Prader

## 2023-01-03 NOTE — PROGRESS NOTES
1. \"Have you been to the ER, urgent care clinic since your last visit? Hospitalized since your last visit? \" No    2. \"Have you seen or consulted any other health care providers outside of the [address] [place] [address] since your last visit? \" Yes, podiatrist     3. For patients aged 39-70: Has the patient had a colonoscopy / FIT/ Cologuard? Yes - Care Gap present.  Most recent result on file

## 2023-01-03 NOTE — PATIENT INSTRUCTIONS
Medicare Wellness Visit, Male    The best way to live healthy is to have a lifestyle where you eat a well-balanced diet, exercise regularly, limit alcohol use, and quit all forms of tobacco/nicotine, if applicable. Regular preventive services are another way to keep healthy. Preventive services (vaccines, screening tests, monitoring & exams) can help personalize your care plan, which helps you manage your own care. Screening tests can find health problems at the earliest stages, when they are easiest to treat. [facility] follows the current, evidence-based guidelines published by the [facility] Loy ([facility]) when recommending preventive services for our patients. Because we follow these guidelines, sometimes recommendations change over time as research supports it. (For example, a prostate screening blood test is no longer routinely recommended for men with no symptoms). Of course, you and your doctor may decide to screen more often for some diseases, based on your risk and co-morbidities (chronic disease you are already diagnosed with). Preventive services for you include:  - Medicare offers their members a free annual wellness visit, which is time for you and your primary care provider to discuss and plan for your preventive service needs.  Take advantage of this benefit every year!    -Over the age of 72 should receive the recommended pneumonia vaccines.   -All adults should have a flu vaccine yearly.  -All adults should receive a tetanus vaccine every 10 years.  -Over the age of 48 should receive the shingles vaccines.    -All adults should be screened once for Hepatitis C.  -All adults age 38-68 who are overweight should have a diabetes screening test once every three years.   -Other screening tests & preventive services for persons with diabetes include: an eye exam to screen for diabetic retinopathy, a kidney function test, a foot exam, and stricter control over your cholesterol.   -Cardiovascular screening for adults with routine risk involves an electrocardiogram (ECG) at intervals determined by the provider.     -Colorectal cancer screening should be done for adults age 43-69 with no increased risk factors for colorectal cancer. There are a number of acceptable methods of screening for this type of cancer. Each test has its own benefits and drawbacks. Discuss with your provider what is most appropriate for you during your annual wellness visit. The different tests include: colonoscopy (considered the best screening method), a fecal occult blood test, a fecal DNA test, and sigmoidoscopy.    -Lung cancer screening is recommended annually with a low dose CT scan for adults between age 54 and 68, who have smoked at least 30 pack years (equivalent of 1 pack per day for 30 days), and who is a current smoker or quit less than 15 years ago. -An Abdominal Aortic Aneurysm (AAA) Screening is recommended for men age 73-68 who has ever smoked in their lifetime.      Here is a list of your current Health Maintenance items (your personalized list of preventive services) with a due date:  Health Maintenance Due   Topic Date Due    COVID-19 Vaccine (1) Never done    Shingles Vaccine (1 of 2) Never done    Pneumococcal Vaccine (1 - PCV) Never done    Yearly Flu Vaccine (1) 08/01/2022    Annual Well Visit  12/14/2022

## 2023-01-25 ENCOUNTER — TELEPHONE (OUTPATIENT)
Dept: FAMILY MEDICINE CLINIC | Age: 68
End: 2023-01-25

## 2023-01-25 NOTE — TELEPHONE ENCOUNTER
[facility] calling for the status of the preop paperwork that was faxed over to the provider. States they need it by today or tomorrow for the patients colonoscopy/EGD on Monday.

## 2023-02-04 DIAGNOSIS — E78.5 DYSLIPIDEMIA: Primary | ICD-10-CM

## 2023-02-05 DIAGNOSIS — E78.5 DYSLIPIDEMIA: Primary | ICD-10-CM

## 2023-02-07 DIAGNOSIS — R73.9 ELEVATED BLOOD SUGAR: Primary | ICD-10-CM

## 2023-02-07 DIAGNOSIS — E78.5 DYSLIPIDEMIA: Primary | ICD-10-CM

## 2023-02-14 NOTE — TELEPHONE ENCOUNTER
Last Visit: 01-   Next Appointment: 07-      Requested Prescriptions     Pending Prescriptions Disp Refills    simvastatin (ZOCOR) 40 MG tablet [Pharmacy Med Name: SIMVASTATIN TABS 40MG] 90 tablet 3     Sig: TAKE 1 TABLET NIGHTLY    clopidogrel (PLAVIX) 75 MG tablet [Pharmacy Med Name: CLOPIDOGREL BISULFATE TABS 75MG] 90 tablet 3     Sig: TAKE 1 TABLET DAILY

## 2023-02-16 RX ORDER — CLOPIDOGREL BISULFATE 75 MG/1
TABLET ORAL
Qty: 90 TABLET | Refills: 3 | Status: SHIPPED | OUTPATIENT
Start: 2023-02-16

## 2023-02-16 RX ORDER — SIMVASTATIN 40 MG
TABLET ORAL
Qty: 90 TABLET | Refills: 3 | Status: SHIPPED | OUTPATIENT
Start: 2023-02-16

## 2023-06-26 ENCOUNTER — HOSPITAL ENCOUNTER (OUTPATIENT)
Facility: HOSPITAL | Age: 68
Setting detail: SPECIMEN
Discharge: HOME | End: 2023-06-29
Payer: MEDICARE

## 2023-06-26 DIAGNOSIS — R73.9 ELEVATED BLOOD SUGAR: ICD-10-CM

## 2023-06-26 DIAGNOSIS — E78.5 DYSLIPIDEMIA: ICD-10-CM

## 2023-06-26 LAB
ALBUMIN SERPL-MCNC: 3.7 G/DL (ref 3.4–5)
ALBUMIN/GLOB SERPL: 1.2 (ref 0.8–1.7)
ALP SERPL-CCNC: 66 U/L (ref 45–117)
ALT SERPL-CCNC: 37 U/L (ref 16–61)
ANION GAP SERPL CALC-SCNC: 4 MMOL/L (ref 3–18)
AST SERPL-CCNC: 19 U/L (ref 10–38)
BASOPHILS # BLD: 0.1 K/UL (ref 0–0.1)
BASOPHILS NFR BLD: 1 % (ref 0–2)
BILIRUB SERPL-MCNC: 0.7 MG/DL (ref 0.2–1)
BUN SERPL-MCNC: 12 MG/DL (ref 7–18)
BUN/CREAT SERPL: 12 (ref 12–20)
CALCIUM SERPL-MCNC: 9.2 MG/DL (ref 8.5–10.1)
CHLORIDE SERPL-SCNC: 109 MMOL/L (ref 100–111)
CHOLEST SERPL-MCNC: 128 MG/DL
CO2 SERPL-SCNC: 28 MMOL/L (ref 21–32)
CREAT SERPL-MCNC: 1.04 MG/DL (ref 0.6–1.3)
DIFFERENTIAL METHOD BLD: ABNORMAL
EOSINOPHIL # BLD: 0.4 K/UL (ref 0–0.4)
EOSINOPHIL NFR BLD: 3 % (ref 0–5)
ERYTHROCYTE [DISTWIDTH] IN BLOOD BY AUTOMATED COUNT: 12.8 % (ref 11.6–14.5)
EST. AVERAGE GLUCOSE BLD GHB EST-MCNC: 105 MG/DL
GLOBULIN SER CALC-MCNC: 3 G/DL (ref 2–4)
GLUCOSE SERPL-MCNC: 103 MG/DL (ref 74–99)
HBA1C MFR BLD: 5.3 % (ref 4.2–5.6)
HCT VFR BLD AUTO: 48 % (ref 36–48)
HDLC SERPL-MCNC: 59 MG/DL (ref 40–60)
HDLC SERPL: 2.2 (ref 0–5)
HGB BLD-MCNC: 15.1 G/DL (ref 13–16)
IMM GRANULOCYTES # BLD AUTO: 0 K/UL (ref 0–0.04)
IMM GRANULOCYTES NFR BLD AUTO: 0 % (ref 0–0.5)
LDLC SERPL CALC-MCNC: 55.2 MG/DL (ref 0–100)
LIPID PANEL: NORMAL
LYMPHOCYTES # BLD: 3.2 K/UL (ref 0.9–3.6)
LYMPHOCYTES NFR BLD: 32 % (ref 21–52)
MCH RBC QN AUTO: 31.7 PG (ref 24–34)
MCHC RBC AUTO-ENTMCNC: 31.5 G/DL (ref 31–37)
MCV RBC AUTO: 100.8 FL (ref 78–100)
MONOCYTES # BLD: 1 K/UL (ref 0.05–1.2)
MONOCYTES NFR BLD: 10 % (ref 3–10)
NEUTS SEG # BLD: 5.5 K/UL (ref 1.8–8)
NEUTS SEG NFR BLD: 54 % (ref 40–73)
NRBC # BLD: 0 K/UL (ref 0–0.01)
NRBC BLD-RTO: 0 PER 100 WBC
PLATELET # BLD AUTO: 239 K/UL (ref 135–420)
PMV BLD AUTO: 12 FL (ref 9.2–11.8)
POTASSIUM SERPL-SCNC: 4.2 MMOL/L (ref 3.5–5.5)
PROT SERPL-MCNC: 6.7 G/DL (ref 6.4–8.2)
RBC # BLD AUTO: 4.76 M/UL (ref 4.35–5.65)
SODIUM SERPL-SCNC: 141 MMOL/L (ref 136–145)
TRIGL SERPL-MCNC: 69 MG/DL
VLDLC SERPL CALC-MCNC: 13.8 MG/DL
WBC # BLD AUTO: 10.2 K/UL (ref 4.6–13.2)

## 2023-06-26 PROCEDURE — 80053 COMPREHEN METABOLIC PANEL: CPT

## 2023-06-26 PROCEDURE — 36415 COLL VENOUS BLD VENIPUNCTURE: CPT

## 2023-06-26 PROCEDURE — 83036 HEMOGLOBIN GLYCOSYLATED A1C: CPT

## 2023-06-26 PROCEDURE — 85025 COMPLETE CBC W/AUTO DIFF WBC: CPT

## 2023-06-26 PROCEDURE — 80061 LIPID PANEL: CPT

## 2023-07-02 SDOH — ECONOMIC STABILITY: INCOME INSECURITY: HOW HARD IS IT FOR YOU TO PAY FOR THE VERY BASICS LIKE FOOD, HOUSING, MEDICAL CARE, AND HEATING?: PATIENT DECLINED

## 2023-07-02 SDOH — ECONOMIC STABILITY: TRANSPORTATION INSECURITY
IN THE PAST 12 MONTHS, HAS LACK OF TRANSPORTATION KEPT YOU FROM MEETINGS, WORK, OR FROM GETTING THINGS NEEDED FOR DAILY LIVING?: PATIENT DECLINED

## 2023-07-02 SDOH — ECONOMIC STABILITY: HOUSING INSECURITY
IN THE LAST 12 MONTHS, WAS THERE A TIME WHEN YOU DID NOT HAVE A STEADY PLACE TO SLEEP OR SLEPT IN A SHELTER (INCLUDING NOW)?: PATIENT REFUSED

## 2023-07-02 SDOH — ECONOMIC STABILITY: FOOD INSECURITY: WITHIN THE PAST 12 MONTHS, THE FOOD YOU BOUGHT JUST DIDN'T LAST AND YOU DIDN'T HAVE MONEY TO GET MORE.: PATIENT DECLINED

## 2023-07-02 SDOH — ECONOMIC STABILITY: FOOD INSECURITY: WITHIN THE PAST 12 MONTHS, YOU WORRIED THAT YOUR FOOD WOULD RUN OUT BEFORE YOU GOT MONEY TO BUY MORE.: PATIENT DECLINED

## 2023-07-03 ENCOUNTER — OFFICE VISIT (OUTPATIENT)
Age: 68
End: 2023-07-03
Payer: MEDICARE

## 2023-07-03 VITALS
WEIGHT: 185.2 LBS | BODY MASS INDEX: 26.51 KG/M2 | SYSTOLIC BLOOD PRESSURE: 113 MMHG | TEMPERATURE: 97.1 F | HEART RATE: 84 BPM | DIASTOLIC BLOOD PRESSURE: 76 MMHG | HEIGHT: 70 IN | OXYGEN SATURATION: 95 % | RESPIRATION RATE: 16 BRPM

## 2023-07-03 DIAGNOSIS — R73.9 HYPERGLYCEMIA, UNSPECIFIED: ICD-10-CM

## 2023-07-03 DIAGNOSIS — E78.00 PURE HYPERCHOLESTEROLEMIA: Primary | ICD-10-CM

## 2023-07-03 PROCEDURE — 1123F ACP DISCUSS/DSCN MKR DOCD: CPT | Performed by: FAMILY MEDICINE

## 2023-07-03 PROCEDURE — 99214 OFFICE O/P EST MOD 30 MIN: CPT | Performed by: FAMILY MEDICINE

## 2023-07-03 PROCEDURE — 99211 OFF/OP EST MAY X REQ PHY/QHP: CPT

## 2023-07-03 RX ORDER — TRIAMCINOLONE ACETONIDE 55 UG/1
2 SPRAY, METERED NASAL DAILY PRN
COMMUNITY
Start: 2012-02-21

## 2023-07-03 ASSESSMENT — PATIENT HEALTH QUESTIONNAIRE - PHQ9
SUM OF ALL RESPONSES TO PHQ QUESTIONS 1-9: 0
1. LITTLE INTEREST OR PLEASURE IN DOING THINGS: 0
SUM OF ALL RESPONSES TO PHQ QUESTIONS 1-9: 0
2. FEELING DOWN, DEPRESSED OR HOPELESS: 0
SUM OF ALL RESPONSES TO PHQ QUESTIONS 1-9: 0
SUM OF ALL RESPONSES TO PHQ9 QUESTIONS 1 & 2: 0
SUM OF ALL RESPONSES TO PHQ QUESTIONS 1-9: 0

## 2023-07-03 NOTE — PROGRESS NOTES
1. \"Have you been to the ER, urgent care clinic since your last visit? Hospitalized since your last visit? \" No    2. \"Have you seen or consulted any other health care providers outside of the [address] [place] [address] since your last visit? \"  Yes, [id number] [facility]       3. For patients aged 43-73: Has the patient had a colonoscopy / FIT/ Cologuard? Yes - Care Gap present.  Most recent result on file

## 2023-07-03 NOTE — PROGRESS NOTES
HPI:  Kirti Montgomery is a 75 y.o. male who presents today with   Chief Complaint   Patient presents with    Cholesterol Problem     6 month follow-up      Here  to follow up on Cholesterol    Has been well ; No new complaints; has lost weight; has changed diet/activity    Has had blood work and I here to review the same. Declines immunizations     Had a fall and hurt his arm; sounds like the skin was torn; it is healing well;      Wt Readings from Last 3 Encounters:   07/03/23 185 lb 3.2 oz (84 kg)   01/03/23 190 lb 11.2 oz (86.5 kg)   06/27/22 181 lb 12.8 oz (82.5 kg)         Lab Results   Component Value Date    CHOL 128 06/26/2023    CHOL 170 12/29/2022    CHOL 145 06/20/2022     Lab Results   Component Value Date    TRIG 69 06/26/2023    TRIG 63 12/29/2022    TRIG 47 06/20/2022     Lab Results   Component Value Date    HDL 59 06/26/2023    HDL 92 (H) 12/29/2022    HDL 94 (H) 06/20/2022     Lab Results   Component Value Date    LDLCALC 55.2 06/26/2023    LDLCALC 65.4 12/29/2022    LDLCALC 41.6 06/20/2022     Lab Results   Component Value Date    LABVLDL 13.8 06/26/2023    LABVLDL 12.6 12/29/2022    LABVLDL 9.4 06/20/2022     Lab Results   Component Value Date    CHOLHDLRATIO 2.2 06/26/2023    CHOLHDLRATIO 1.8 12/29/2022    CHOLHDLRATIO 1.5 06/20/2022     Lab Results   Component Value Date     06/26/2023    K 4.2 06/26/2023     06/26/2023    CO2 28 06/26/2023    BUN 12 06/26/2023    CREATININE 1.04 06/26/2023    GLUCOSE 103 (H) 06/26/2023    CALCIUM 9.2 06/26/2023    PROT 6.7 06/26/2023    LABALBU 3.7 06/26/2023    BILITOT 0.7 06/26/2023    ALKPHOS 66 06/26/2023    AST 19 06/26/2023    ALT 37 06/26/2023    LABGLOM >60 06/26/2023    GFRAA >60 06/20/2022    AGRATIO 1.3 12/29/2022    GLOB 3.0 06/26/2023       Lab Results   Component Value Date    WBC 10.2 06/26/2023    HGB 15.1 06/26/2023    HCT 48.0 06/26/2023    .8 (H) 06/26/2023     06/26/2023         Hemoglobin A1C   Date Value

## 2023-08-24 ENCOUNTER — OFFICE VISIT (OUTPATIENT)
Age: 68
End: 2023-08-24
Payer: MEDICARE

## 2023-08-24 VITALS
BODY MASS INDEX: 27.03 KG/M2 | RESPIRATION RATE: 16 BRPM | WEIGHT: 188.8 LBS | TEMPERATURE: 97.3 F | SYSTOLIC BLOOD PRESSURE: 129 MMHG | HEART RATE: 78 BPM | HEIGHT: 70 IN | DIASTOLIC BLOOD PRESSURE: 60 MMHG | OXYGEN SATURATION: 98 %

## 2023-08-24 DIAGNOSIS — R35.0 URINARY FREQUENCY: ICD-10-CM

## 2023-08-24 DIAGNOSIS — N40.1 BENIGN PROSTATIC HYPERPLASIA WITH URINARY FREQUENCY: Primary | ICD-10-CM

## 2023-08-24 DIAGNOSIS — R35.0 BENIGN PROSTATIC HYPERPLASIA WITH URINARY FREQUENCY: Primary | ICD-10-CM

## 2023-08-24 LAB
BILIRUBIN, URINE, POC: NEGATIVE
BLOOD URINE, POC: NEGATIVE
GLUCOSE URINE, POC: NEGATIVE
KETONES, URINE, POC: NEGATIVE
LEUKOCYTE ESTERASE, URINE, POC: NEGATIVE
NITRITE, URINE, POC: NEGATIVE
PH, URINE, POC: 5.5 (ref 4.6–8)
PROTEIN,URINE, POC: NEGATIVE
SPECIFIC GRAVITY, URINE, POC: 1.03 (ref 1–1.03)
URINALYSIS CLARITY, POC: CLEAR
URINALYSIS COLOR, POC: YELLOW
UROBILINOGEN, POC: NORMAL

## 2023-08-24 PROCEDURE — 99213 OFFICE O/P EST LOW 20 MIN: CPT | Performed by: STUDENT IN AN ORGANIZED HEALTH CARE EDUCATION/TRAINING PROGRAM

## 2023-08-24 PROCEDURE — PBSHW AMB POC URINALYSIS DIP STICK MANUAL W/O MICRO: Performed by: STUDENT IN AN ORGANIZED HEALTH CARE EDUCATION/TRAINING PROGRAM

## 2023-08-24 PROCEDURE — 1123F ACP DISCUSS/DSCN MKR DOCD: CPT | Performed by: STUDENT IN AN ORGANIZED HEALTH CARE EDUCATION/TRAINING PROGRAM

## 2023-08-24 PROCEDURE — 81002 URINALYSIS NONAUTO W/O SCOPE: CPT | Performed by: STUDENT IN AN ORGANIZED HEALTH CARE EDUCATION/TRAINING PROGRAM

## 2023-08-24 RX ORDER — TAMSULOSIN HYDROCHLORIDE 0.4 MG/1
0.4 CAPSULE ORAL DAILY
Qty: 90 CAPSULE | Refills: 1 | Status: SHIPPED | OUTPATIENT
Start: 2023-08-24

## 2023-08-24 ASSESSMENT — PATIENT HEALTH QUESTIONNAIRE - PHQ9
SUM OF ALL RESPONSES TO PHQ QUESTIONS 1-9: 0
1. LITTLE INTEREST OR PLEASURE IN DOING THINGS: 0
2. FEELING DOWN, DEPRESSED OR HOPELESS: 0
SUM OF ALL RESPONSES TO PHQ QUESTIONS 1-9: 0
SUM OF ALL RESPONSES TO PHQ9 QUESTIONS 1 & 2: 0

## 2023-08-24 NOTE — PATIENT INSTRUCTIONS
assume any responsibility for any aspect of healthcare administered with the aid of information [facility] provides. The information contained herein is not intended to cover all possible uses, directions, precautions, warnings, drug interactions, allergic reactions, or adverse effects. If you have questions about the drugs you are taking, check with your doctor, nurse or pharmacist.  Copyright 6982-4295 [address] [place] [address] Road: 9.02. Revision date: 5/16/2019. Care instructions adapted under license by [facility] ([facility]). If you have questions about a medical condition or this instruction, always ask your healthcare professional. [address] any warranty or liability for your use of this information. Patient Education        Benign Prostatic Hyperplasia: Care Instructions  Your Care Instructions     Benign prostatic hyperplasia, or BPH, is an enlarged prostate gland. The prostate is a small gland that makes some of the fluid in semen. Prostate enlargement happens to almost all men as they age. It is usually not serious. BPH does not cause prostate cancer. As the prostate gets bigger, it may partly block the flow of urine. You may have a hard time getting a urine stream started or completely stopped. You may have a weak urine stream, or you may have to urinate more often than you used to, especially at night. Most men find these problems easy to manage. You do not need treatment unless your symptoms bother you a lot or you have other problems, such as bladder infections or stones. In these cases, medicines may help. Surgery is not needed unless the urine flow is blocked or the symptoms do not get better with medicine. Follow-up care is a key part of your treatment and safety. Be sure to make and go to all appointments, and call your doctor if you are having problems. It's also a good idea to know your test results and keep a list of the medicines you take.   How can you care for yourself at

## 2023-08-24 NOTE — PROGRESS NOTES
1. \"Have you been to the ER, urgent care clinic since your last visit? Hospitalized since your last visit? \" No    2. \"Have you seen or consulted any other health care providers outside of the [address] [place] [address] since your last visit? \" No     3. For patients aged 43-73: Has the patient had a colonoscopy / FIT/ Cologuard?  Yes - no Care Gap present

## 2023-08-26 ASSESSMENT — ENCOUNTER SYMPTOMS
CONSTIPATION: 0
EYE DISCHARGE: 0
VOMITING: 0
WHEEZING: 0
ABDOMINAL PAIN: 0
NAUSEA: 0
TROUBLE SWALLOWING: 0
SHORTNESS OF BREATH: 0
DIARRHEA: 0
BACK PAIN: 0
PHOTOPHOBIA: 0
VOICE CHANGE: 0
SORE THROAT: 0
COUGH: 0
EYE ITCHING: 0
RHINORRHEA: 0

## 2023-12-27 ENCOUNTER — APPOINTMENT (OUTPATIENT)
Age: 68
End: 2023-12-27
Payer: MEDICARE

## 2023-12-27 ENCOUNTER — HOSPITAL ENCOUNTER (OUTPATIENT)
Facility: HOSPITAL | Age: 68
Setting detail: SPECIMEN
Discharge: HOME | End: 2023-12-30
Payer: MEDICARE

## 2023-12-27 DIAGNOSIS — N40.1 BENIGN PROSTATIC HYPERPLASIA WITH URINARY FREQUENCY: ICD-10-CM

## 2023-12-27 DIAGNOSIS — R35.0 BENIGN PROSTATIC HYPERPLASIA WITH URINARY FREQUENCY: ICD-10-CM

## 2023-12-27 DIAGNOSIS — E78.00 PURE HYPERCHOLESTEROLEMIA: ICD-10-CM

## 2023-12-27 DIAGNOSIS — R73.9 HYPERGLYCEMIA, UNSPECIFIED: ICD-10-CM

## 2023-12-27 LAB
ALBUMIN SERPL-MCNC: 3.8 G/DL (ref 3.4–5)
ALBUMIN/GLOB SERPL: 1.3 (ref 0.8–1.7)
ALP SERPL-CCNC: 67 U/L (ref 45–117)
ALT SERPL-CCNC: 24 U/L (ref 16–61)
ANION GAP SERPL CALC-SCNC: 3 MMOL/L (ref 3–18)
AST SERPL-CCNC: 14 U/L (ref 10–38)
BASOPHILS # BLD: 0.1 K/UL (ref 0–0.1)
BASOPHILS NFR BLD: 1 % (ref 0–2)
BILIRUB SERPL-MCNC: 0.7 MG/DL (ref 0.2–1)
BUN SERPL-MCNC: 21 MG/DL (ref 7–18)
BUN/CREAT SERPL: 19 (ref 12–20)
CALCIUM SERPL-MCNC: 9.2 MG/DL (ref 8.5–10.1)
CHLORIDE SERPL-SCNC: 110 MMOL/L (ref 100–111)
CHOLEST SERPL-MCNC: 143 MG/DL
CO2 SERPL-SCNC: 30 MMOL/L (ref 21–32)
CREAT SERPL-MCNC: 1.12 MG/DL (ref 0.6–1.3)
DIFFERENTIAL METHOD BLD: ABNORMAL
EOSINOPHIL # BLD: 0.3 K/UL (ref 0–0.4)
EOSINOPHIL NFR BLD: 3 % (ref 0–5)
ERYTHROCYTE [DISTWIDTH] IN BLOOD BY AUTOMATED COUNT: 13.3 % (ref 11.6–14.5)
EST. AVERAGE GLUCOSE BLD GHB EST-MCNC: 103 MG/DL
GLOBULIN SER CALC-MCNC: 2.9 G/DL (ref 2–4)
GLUCOSE SERPL-MCNC: 102 MG/DL (ref 74–99)
HBA1C MFR BLD: 5.2 % (ref 4.2–5.6)
HCT VFR BLD AUTO: 48.7 % (ref 36–48)
HDLC SERPL-MCNC: 62 MG/DL (ref 40–60)
HDLC SERPL: 2.3 (ref 0–5)
HGB BLD-MCNC: 15.2 G/DL (ref 13–16)
IMM GRANULOCYTES # BLD AUTO: 0 K/UL (ref 0–0.04)
IMM GRANULOCYTES NFR BLD AUTO: 0 % (ref 0–0.5)
LDLC SERPL CALC-MCNC: 70 MG/DL (ref 0–100)
LIPID PANEL: ABNORMAL
LYMPHOCYTES # BLD: 3.8 K/UL (ref 0.9–3.6)
LYMPHOCYTES NFR BLD: 39 % (ref 21–52)
MCH RBC QN AUTO: 31.1 PG (ref 24–34)
MCHC RBC AUTO-ENTMCNC: 31.2 G/DL (ref 31–37)
MCV RBC AUTO: 99.8 FL (ref 78–100)
MONOCYTES # BLD: 1 K/UL (ref 0.05–1.2)
MONOCYTES NFR BLD: 10 % (ref 3–10)
NEUTS SEG # BLD: 4.5 K/UL (ref 1.8–8)
NEUTS SEG NFR BLD: 47 % (ref 40–73)
NRBC # BLD: 0 K/UL (ref 0–0.01)
NRBC BLD-RTO: 0 PER 100 WBC
PLATELET # BLD AUTO: 217 K/UL (ref 135–420)
PMV BLD AUTO: 11.8 FL (ref 9.2–11.8)
POTASSIUM SERPL-SCNC: 4.3 MMOL/L (ref 3.5–5.5)
PROT SERPL-MCNC: 6.7 G/DL (ref 6.4–8.2)
RBC # BLD AUTO: 4.88 M/UL (ref 4.35–5.65)
SODIUM SERPL-SCNC: 143 MMOL/L (ref 136–145)
TRIGL SERPL-MCNC: 55 MG/DL
VLDLC SERPL CALC-MCNC: 11 MG/DL
WBC # BLD AUTO: 9.8 K/UL (ref 4.6–13.2)

## 2023-12-27 PROCEDURE — 84153 ASSAY OF PSA TOTAL: CPT

## 2023-12-27 PROCEDURE — 36415 COLL VENOUS BLD VENIPUNCTURE: CPT

## 2023-12-27 PROCEDURE — 80053 COMPREHEN METABOLIC PANEL: CPT

## 2023-12-27 PROCEDURE — 85025 COMPLETE CBC W/AUTO DIFF WBC: CPT

## 2023-12-27 PROCEDURE — 83036 HEMOGLOBIN GLYCOSYLATED A1C: CPT

## 2023-12-27 PROCEDURE — 80061 LIPID PANEL: CPT

## 2023-12-28 LAB
PSA SERPL-MCNC: 2.5 NG/ML (ref 0–4)
REFLEX CRITERIA: NORMAL

## 2024-01-03 ENCOUNTER — OFFICE VISIT (OUTPATIENT)
Age: 69
End: 2024-01-03
Payer: MEDICARE

## 2024-01-03 VITALS
SYSTOLIC BLOOD PRESSURE: 116 MMHG | RESPIRATION RATE: 16 BRPM | WEIGHT: 184 LBS | HEIGHT: 70 IN | HEART RATE: 92 BPM | BODY MASS INDEX: 26.34 KG/M2 | DIASTOLIC BLOOD PRESSURE: 72 MMHG | OXYGEN SATURATION: 96 % | TEMPERATURE: 96.9 F

## 2024-01-03 DIAGNOSIS — R73.9 HYPERGLYCEMIA, UNSPECIFIED: ICD-10-CM

## 2024-01-03 DIAGNOSIS — N40.1 BENIGN PROSTATIC HYPERPLASIA WITH URINARY FREQUENCY: ICD-10-CM

## 2024-01-03 DIAGNOSIS — L98.9 SKIN LESION OF LEFT ARM: ICD-10-CM

## 2024-01-03 DIAGNOSIS — R35.0 BENIGN PROSTATIC HYPERPLASIA WITH URINARY FREQUENCY: ICD-10-CM

## 2024-01-03 DIAGNOSIS — E78.00 PURE HYPERCHOLESTEROLEMIA: ICD-10-CM

## 2024-01-03 DIAGNOSIS — Z00.00 MEDICARE ANNUAL WELLNESS VISIT, SUBSEQUENT: Primary | ICD-10-CM

## 2024-01-03 PROCEDURE — G0439 PPPS, SUBSEQ VISIT: HCPCS | Performed by: FAMILY MEDICINE

## 2024-01-03 PROCEDURE — 1123F ACP DISCUSS/DSCN MKR DOCD: CPT | Performed by: FAMILY MEDICINE

## 2024-01-03 PROCEDURE — 99213 OFFICE O/P EST LOW 20 MIN: CPT | Performed by: FAMILY MEDICINE

## 2024-01-03 RX ORDER — SOLIFENACIN SUCCINATE 5 MG/1
5 TABLET, FILM COATED ORAL DAILY
Qty: 90 TABLET | Refills: 1 | Status: SHIPPED | OUTPATIENT
Start: 2024-01-03 | End: 2024-07-01

## 2024-01-03 ASSESSMENT — ANXIETY QUESTIONNAIRES
2. NOT BEING ABLE TO STOP OR CONTROL WORRYING: 0
6. BECOMING EASILY ANNOYED OR IRRITABLE: 0
IF YOU CHECKED OFF ANY PROBLEMS ON THIS QUESTIONNAIRE, HOW DIFFICULT HAVE THESE PROBLEMS MADE IT FOR YOU TO DO YOUR WORK, TAKE CARE OF THINGS AT HOME, OR GET ALONG WITH OTHER PEOPLE: NOT DIFFICULT AT ALL
GAD7 TOTAL SCORE: 0
3. WORRYING TOO MUCH ABOUT DIFFERENT THINGS: 0
4. TROUBLE RELAXING: 0
7. FEELING AFRAID AS IF SOMETHING AWFUL MIGHT HAPPEN: 0
5. BEING SO RESTLESS THAT IT IS HARD TO SIT STILL: 0
1. FEELING NERVOUS, ANXIOUS, OR ON EDGE: 0

## 2024-01-03 ASSESSMENT — PATIENT HEALTH QUESTIONNAIRE - PHQ9
SUM OF ALL RESPONSES TO PHQ QUESTIONS 1-9: 0
SUM OF ALL RESPONSES TO PHQ9 QUESTIONS 1 & 2: 0
SUM OF ALL RESPONSES TO PHQ QUESTIONS 1-9: 0
SUM OF ALL RESPONSES TO PHQ QUESTIONS 1-9: 0
1. LITTLE INTEREST OR PLEASURE IN DOING THINGS: 0
SUM OF ALL RESPONSES TO PHQ QUESTIONS 1-9: 0
2. FEELING DOWN, DEPRESSED OR HOPELESS: 0

## 2024-01-03 ASSESSMENT — LIFESTYLE VARIABLES
HOW OFTEN DO YOU HAVE A DRINK CONTAINING ALCOHOL: MONTHLY OR LESS
HOW MANY STANDARD DRINKS CONTAINING ALCOHOL DO YOU HAVE ON A TYPICAL DAY: 1 OR 2

## 2024-01-03 NOTE — PROGRESS NOTES
1. \"Have you been to the ER, urgent care clinic since your last visit?  Hospitalized since your last visit?\" No    2. \"Have you seen or consulted any other health care providers outside of the [facility] since your last visit?\" No     3. For patients aged 45-75: Has the patient had a colonoscopy / FIT/ Cologuard? Yes - Care Gap present. Most recent result on file

## 2024-01-03 NOTE — PATIENT INSTRUCTIONS
Hearing Loss: Care Instructions  Overview     Hearing loss is a sudden or slow decrease in how well you hear. It can range from slight to profound. Permanent hearing loss can occur with aging. It also can happen when you are exposed long-term to loud noise. Examples include listening to loud music, riding motorcycles, or being around other loud machines.  Hearing loss can affect your work and home life. It can make you feel lonely or depressed. You may feel that you have lost your independence. But hearing aids and other devices can help you hear better and feel connected to others.  Follow-up care is a key part of your treatment and safety. Be sure to make and go to all appointments, and call your doctor if you are having problems. It's also a good idea to know your test results and keep a list of the medicines you take.  How can you care for yourself at home?  Avoid loud noises whenever possible. This helps keep your hearing from getting worse.  Always wear hearing protection around loud noises.  Wear a hearing aid as directed.  A professional can help you pick a hearing aid that will work best for you.  You can also get hearing aids over the counter for mild to moderate hearing loss.  Have hearing tests as your doctor suggests. They can show whether your hearing has changed. Your hearing aid may need to be adjusted.  Use other devices as needed. These may include:  Telephone amplifiers and hearing aids that can connect to a television, stereo, radio, or microphone.  Devices that use lights or vibrations. These alert you to the doorbell, a ringing telephone, or a baby monitor.  Television closed-captioning. This shows the words at the bottom of the screen. Most new TVs can do this.  TTY (text telephone). This lets you type messages back and forth on the telephone instead of talking or listening. These devices are also called TDD. When messages are typed on the keyboard, they are sent over the phone line to a

## 2024-01-03 NOTE — PROGRESS NOTES
Medicare Annual Wellness Visit    Frank Cardona is here for Medicare AWV and Discuss Labs    Assessment & Plan   Medicare annual wellness visit, subsequent  Pure hypercholesterolemia  Hyperglycemia, unspecified  Benign prostatic hyperplasia with urinary frequency- mod 25  Skin lesion of left arm- mod 25  -     External Referral To Dermatology      As above  Patient clinically stable  Discontinue Flomax  And Vesicare as per orders  Dermatology referral as per orders  Return in about 5 months (around 6/3/2024) for cholesterol.  This has been fully explained to the patient, who indicates understanding.  [facility] and pt has been advised of same.     Recommendations for Preventive Services Due: see orders and patient instructions/AVS.  Recommended screening schedule for the next 5-10 years is provided to the patient in written form: see Patient Instructions/AVS.     Return in about 5 months (around 6/3/2024) for cholesterol.     Subjective     Patient has overall been well  Reviewed values for his Medicare wellness triage assessment have been reviewed.  Patient also complains of a skin lesion that has been present on the left forearm that concerns him.  He agrees to seeing dermatology for the lesion.    Patient also has been on Flomax.  For BPH with LUTS.  He does not think the Flomax has been helpful.  He is agreeable to trying a different medication for his symptoms and possibly a reassessment with urology.    Wt Readings from Last 3 Encounters:   01/03/24 83.5 kg (184 lb)   08/24/23 85.6 kg (188 lb 12.8 oz)   07/03/23 84 kg (185 lb 3.2 oz)       Patient's complete Health Risk Assessment and screening values have been reviewed and are found in Flowsheets. The following problems were reviewed today and where indicated follow up appointments were made and/or referrals ordered.    Positive Risk Factor Screenings with Interventions:       Cognitive:      Words recalled: 2 Words Recalled

## 2024-01-22 ENCOUNTER — PATIENT MESSAGE (OUTPATIENT)
Age: 69
End: 2024-01-22

## 2024-04-12 RX ORDER — SIMVASTATIN 40 MG
TABLET ORAL
Qty: 90 TABLET | Refills: 3 | Status: SHIPPED | OUTPATIENT
Start: 2024-04-12

## 2024-04-12 RX ORDER — CLOPIDOGREL BISULFATE 75 MG/1
TABLET ORAL
Qty: 90 TABLET | Refills: 3 | Status: SHIPPED | OUTPATIENT
Start: 2024-04-12

## 2024-05-13 NOTE — MR AVS SNAPSHOT
[address] [place] [address] [address] [facility] [address] [phone] Patient: Aditya Rodriguez MRN:  [id number] Visit Information Date & Time Provider Department Dept. Phone Encounter #  
 8/7/2018 12:00 PM Zac Jameson NP [facility] [phone] [id number] Follow-up Instructions Return in about 3 months (around 11/7/2018) for follow up BPH. Upcoming Health Maintenance Date Due Influenza Age 5 to Adult 12/27/2018* DTaP/Tdap/Td series (2 - Td) 5/26/2027 COLONOSCOPY 10/2/2027 *Topic was postponed. The date shown is not the original due date. Allergies as of 8/7/2018  Review Complete On: 8/7/2018 By: Zac Jameson NP Severity Noted Reaction Type Reactions Bactrim [Sulfamethoprim Ds]  09/16/2013    Rash Nexium [Esomeprazole Magnesium]  06/25/2010    Other (comments) Anxiety Current Immunizations  Reviewed on 12/5/2017 Name Date Hep A Vaccine 10/15/2017 Hep B Vaccine 10/15/2017 Influenza Vaccine (Quad) PF 10/13/2017, 11/2/2016, 10/21/2015, 9/26/2014 Influenza Vaccine PF 9/16/2013, 12/4/2012 11:11 AM  
 Influenza Vaccine Split 9/29/2011 Not reviewed this visit You Were Diagnosed With   
  
 Codes Comments Dyslipidemia    -  Primary ICD-10-CM: E78.5 ICD-9-CM: 272.4 Benign prostatic hyperplasia with urinary frequency     ICD-10-CM: N40.1, R35.0 ICD-9-CM: 600.01, 788.41 Elevated blood-pressure reading, without diagnosis of hypertension     ICD-10-CM: R03.0 ICD-9-CM: 796.2 Vitals BP Pulse Temp Resp Height(growth percentile) Weight(growth percentile) 132/75 (BP 1 Location: Left arm, BP Patient Position: Sitting) 100 98.4 °F (36.9 °C) (Oral) 18 5' 10\" (1.778 m) 188 lb (85.3 kg) SpO2 BMI Smoking Status 98% 26.98 kg/m2 Former Smoker BMI and BSA Data  Body Mass Index Body Surface Area  
 26.98 kg/m 2 2.05 m 2  
 Preferred Pharmacy Pharmacy Name Phone [address] [place] [address] [address], [address] [place] [address] Road [phone] Your Updated Medication List  
  
   
This list is accurate as of 8/7/18 12:35 PM.  Always use your most recent med list.  
  
  
  
  
 clopidogrel 75 mg Tab Commonly known as:  PLAVIX Take 1 Tab by mouth daily. famotidine 20 mg tablet Commonly known as:  PEPCID Take 1 Tab by mouth two (2) times a day. simvastatin 40 mg tablet Commonly known as:  ZOCOR Take 1 Tab by mouth nightly. terazosin 1 mg capsule Commonly known as:  HYTRIN Take 1 Cap by mouth nightly. Triamcinolone Acetonide 55 mcg/actuation Aero nasal spray Commonly known as:  NASACORT AQ  
2 Sprays by Both Nostrils route daily as needed (nasal congestion). Prescriptions Sent to Pharmacy Refills  
 terazosin (HYTRIN) 1 mg capsule 2 Sig: Take 1 Cap by mouth nightly. Class: Normal  
 Pharmacy: [address] [place] [address], [address] [place] [address] #: [phone] Route: Oral  
  
Follow-up Instructions Return in about 3 months (around 11/7/2018) for follow up BPH. Patient Instructions High Cholesterol: Care Instructions Your Care Instructions Cholesterol is a type of fat in your blood. It is needed for many body functions, such as making new cells. Cholesterol is made by your body. It also comes from food you eat. High cholesterol means that you have too much of the fat in your blood. This raises your risk of a heart attack and stroke. LDL and HDL are part of your total cholesterol. LDL is the \"bad\" cholesterol. High LDL can raise your risk for heart disease, heart attack, and stroke. HDL is the \"good\" cholesterol. It helps clear bad cholesterol from the body. High HDL is linked with a lower risk of heart disease, heart attack, and stroke.  
Your cholesterol levels help your doctor find out your risk for having a heart attack or stroke. You and your doctor can talk about whether you need to lower your risk and what treatment is best for you. A heart-healthy lifestyle along with medicines can help lower your cholesterol and your risk. The way you choose to lower your risk will depend on how high your risk is for heart attack and stroke. It will also depend on how you feel about taking medicines. Follow-up care is a key part of your treatment and safety. Be sure to make and go to all appointments, and call your doctor if you are having problems. It's also a good idea to know your test results and keep a list of the medicines you take. How can you care for yourself at home? · Eat a variety of foods every day. Good choices include fruits, vegetables, whole grains (like oatmeal), dried beans and peas, nuts and seeds, soy products (like tofu), and fat-free or low-fat dairy products. · Replace butter, margarine, and hydrogenated or partially hydrogenated oils with olive and canola oils. (Canola oil margarine without trans fat is fine.) · Replace red meat with fish, poultry, and soy protein (like tofu). · Limit processed and packaged foods like chips, crackers, and cookies. · Bake, broil, or steam foods. Don't ferguson them. · Be physically active. Get at least 30 minutes of exercise on most days of the week. Walking is a good choice. You also may want to do other activities, such as running, swimming, cycling, or playing tennis or team sports. · Stay at a healthy weight or lose weight by making the changes in eating and physical activity listed above. Losing just a small amount of weight, even 5 to 10 pounds, can reduce your risk for having a heart attack or stroke. · Do not smoke. When should you call for help? Watch closely for changes in your health, and be sure to contact your doctor if: 
  · You need help making lifestyle changes.  
  · You have questions about your medicine. Where can you learn more? Go to http://jose d-valorie.info/. Enter G229 in the search box to learn more about \"High Cholesterol: Care Instructions. \" Current as of: May 10, 2017 Content Version: 11.7 © 6134-0225 [employer]. Care instructions adapted under license by [employer] (which disclaims liability or warranty for this information). If you have questions about a medical condition or this instruction, always ask your healthcare professional. [address] any warranty or liability for your use of this information. Benign Prostatic Hyperplasia: Care Instructions Your Care Instructions Benign prostatic hyperplasia, or BPH, is an enlarged prostate gland. The prostate is a small gland that makes some of the fluid in semen. Prostate enlargement happens to almost all men as they age. It is usually not serious. BPH does not cause prostate cancer. As the prostate gets bigger, it may partly block the flow of urine. You may have a hard time getting a urine stream started or completely stopped. BPH can cause dribbling. You may have a weak urine stream, or you may have to urinate more often than you used to, especially at night. Most men find these problems easy to manage. You do not need treatment unless your symptoms bother you a lot or you have other problems, such as bladder infections or stones. In these cases, medicines may help. Surgery is not needed unless the urine flow is blocked or the symptoms do not get better with medicine. Follow-up care is a key part of your treatment and safety. Be sure to make and go to all appointments, and call your doctor if you are having problems. It's also a good idea to know your test results and keep a list of the medicines you take. How can you care for yourself at home? · Take plenty of time to urinate. Try to relax. · Try \"double voiding. \" Urinate as much you can, relax for a few moments, and then try to urinate again. · Sit on the toilet to urinate. · Read or think of other things while you are waiting. · Turn on a faucet, or try to picture running water. Some men find that this helps get their urine flowing. · If dribbling is a problem, wash your penis daily to avoid skin irritation and infection. · Avoid caffeine and alcohol. These drinks will increase how often you need to urinate. Spread your fluid intake throughout the day. If the urge to urinate often wakes you at night, limit your fluid intake in the evening. Urinate right before you go to bed. · Many over-the-counter cold and allergy medicines can make the symptoms of BPH worse. Avoid antihistamines, decongestants, and allergy pills, if you can. Read the warnings on the package. · If you take any prescription medicines, especially tranquilizers or antidepressants, ask your doctor or pharmacist whether they can cause urination problems. There may be other medicines you can use that do not cause urinary problems. · Be safe with medicines. Take your medicines exactly as prescribed. Call your doctor if you think you are having a problem with your medicine. When should you call for help? Call your doctor now or seek immediate medical care if: 
  · You cannot urinate at all.  
  · You have symptoms of a urinary infection. For example: ¨ You have blood or pus in your urine. ¨ You have pain in your back just below your rib cage. This is called flank pain. ¨ You have a fever, chills, or body aches. ¨ It hurts to urinate. ¨ You have groin or belly pain.  
 Watch closely for changes in your health, and be sure to contact your doctor if: 
  · It hurts when you ejaculate.  
  · Your urinary problems get a lot worse or bother you a lot. Where can you learn more? Go to http://jose d-valorie.info/. Enter S799 in the search box to learn more about \"Benign Prostatic Hyperplasia: Care Instructions. \" Current as of: December 3, 2017 Content Version: 11.7 © 7616-4747 [employer]wise, [employer]. Care instructions adapted under license by [employer] (which disclaims liability or warranty for this information). If you have questions about a medical condition or this instruction, always ask your healthcare professional. [address] any warranty or liability for your use of this information. Terazosin (By mouth) Terazosin (ter-AZ-oh-sin) Treats high blood pressure and enlarged prostate (benign prostatic hyperplasia (BPH)). Brand Name(s):  
There may be other brand names for this medicine. When This Medicine Should Not Be Used: You should not use this medicine if you have ever had an allergic reaction to terazosin. How to Use This Medicine:  
Capsule, Tablet, Liquid Filled Capsule · Your doctor will tell you how much of this medicine to take and how often. Your dose may need to be changed several times in order to find out what works best for you. Do not take more medicine or take it more often than your doctor tells you to. · Take this medicine at bedtime unless your doctor tells you otherwise. · Carefully follow your doctor's instructions about any special diet. If a dose is missed: · If you miss a dose or forget to take your medicine, take it as soon as you can. If it is almost time for your next dose, wait until then to take the medicine and skip the missed dose. Do not use extra medicine to make up for a missed dose. How to Store and Dispose of This Medicine: · Store the medicine at room temperature in a closed container, away from heat, moisture, and direct light. Ask your pharmacist, doctor, or health caregiver about the best way to dispose of any outdated medicine or medicine no longer needed. · Keep all medicine away from children and never share your medicine with anyone. Drugs and Foods to Avoid: Ask your doctor or pharmacist before using any other medicine, including over-the-counter medicines, vitamins, and herbal products. · Make sure your doctor knows if you are also using verapamil (Ricardo Call), diuretics or \"water pills\" (such as furosemide, hydrochlorothiazide, Aldactazide®, Aldactone®, Dyazide®, Hyzaar®, Lasix®, Lotrel®, Maxzide®, Moduretic®, Norvasc®, Zestoretic®), or any other blood pressure medicine (such as atenolol, metoprolol, Accupril®, Altace®, Cardizem®, Cardura®, Lotensin®, Lotrel®, Monopril®, Plendil®, Prinivil®, Tiazac®, Toprol®, Vasotec®, Zestril®). · Do not drink alcohol while you are using this medicine. Warnings While Using This Medicine: · Make sure your doctor knows if you are pregnant or breastfeeding. · If you stop using this medicine, your blood pressure may go up. High blood pressure usually has no symptoms. Even if you feel well, do not stop using the medicine without asking your doctor. · Do not stop using this medicine without asking your doctor. If you have not taken the medicine for several days, you will need to use a lower dose when you start taking it again. · Your doctor will need to check your progress at regular visits while you are using this medicine. Be sure to keep all appointments. · This medicine may make you dizzy or drowsy. Avoid driving, using machines, or doing anything else that could be dangerous if you are not alert. Possible Side Effects While Using This Medicine:  
Call your doctor right away if you notice any of these side effects: · Allergic reaction: Itching or hives, swelling in face, lips, or hands, swelling or tingling in the mouth or throat, tightness in chest, trouble breathing · Chest pain (may be related to your disease and not a side effect) · Fast or pounding heartbeat · Fever, chills, sore throat · Lightheadedness or fainting · Numbness or tingling in the hands or feet · Prolonged or painful erection · Swelling in the hands, ankles, or feet, rapid weight gain If you notice these less serious side effects, talk with your doctor: · Blurred vision · Muscle or joint pain · Nausea · Problems with sex · Runny or stuffy nose · Tiredness If you notice other side effects that you think are caused by this medicine, tell your doctor. Call your doctor for medical advice about side effects. You may report side effects to FDA at 4-576-QRP-6127 © 2017 26[address]ip Glover Information is for End User's use only and may not be sold, redistributed or otherwise used for commercial purposes. The above information is an  only. It is not intended as medical advice for individual conditions or treatments. Talk to your doctor, nurse or pharmacist before following any medical regimen to see if it is safe and effective for you. Introducing [facility]! Dear Ashok Hutchinson: Thank you for requesting a GINKGOTREE account. Our records indicate that you already have an active GINKGOTREE account. You can access your account anytime at [email or web address] [employer]/Studio Whale Did you know that you can access your hospital and ER discharge instructions at any time in GINKGOTREE? You can also review all of your test results from your hospital stay or ER visit. Additional Information If you have questions, please visit the Frequently Asked Questions section of the GINKGOTREE website at [email or web address] [email or web address] Health ([email or web address] [email or web address] Remember, GINKGOTREE is NOT to be used for urgent needs. For medical emergencies, dial 911. Now available from your iPhone and Android! Please provide this summary of care documentation to your next provider. Your primary care clinician is listed as Katherine Nuñez. If you have any questions after today's visit, please call [phone]. How Severe Are Your Bumps?: moderate Have Your Bumps Been Treated?: not been treated Is This A New Presentation, Or A Follow-Up?: Bump

## 2024-06-18 RX ORDER — SOLIFENACIN SUCCINATE 5 MG/1
5 TABLET, FILM COATED ORAL DAILY
Qty: 90 TABLET | Refills: 3 | Status: SHIPPED | OUTPATIENT
Start: 2024-06-18

## 2024-06-26 DIAGNOSIS — R73.9 HYPERGLYCEMIA, UNSPECIFIED: Primary | ICD-10-CM

## 2024-06-26 DIAGNOSIS — E78.00 PURE HYPERCHOLESTEROLEMIA: ICD-10-CM

## 2024-06-27 ENCOUNTER — HOSPITAL ENCOUNTER (OUTPATIENT)
Facility: HOSPITAL | Age: 69
Setting detail: SPECIMEN
Discharge: HOME | End: 2024-06-27
Payer: MEDICARE

## 2024-06-27 DIAGNOSIS — E78.00 PURE HYPERCHOLESTEROLEMIA: ICD-10-CM

## 2024-06-27 DIAGNOSIS — R73.9 HYPERGLYCEMIA, UNSPECIFIED: ICD-10-CM

## 2024-06-27 LAB
ALBUMIN SERPL-MCNC: 3.8 G/DL (ref 3.4–5)
ALBUMIN/GLOB SERPL: 1.3 (ref 0.8–1.7)
ALP SERPL-CCNC: 76 U/L (ref 45–117)
ALT SERPL-CCNC: 29 U/L (ref 16–61)
ANION GAP SERPL CALC-SCNC: 6 MMOL/L (ref 3–18)
AST SERPL-CCNC: 16 U/L (ref 10–38)
BILIRUB SERPL-MCNC: 0.2 MG/DL (ref 0.2–1)
BUN SERPL-MCNC: 18 MG/DL (ref 7–18)
BUN/CREAT SERPL: 15 (ref 12–20)
CALCIUM SERPL-MCNC: 9.1 MG/DL (ref 8.5–10.1)
CHLORIDE SERPL-SCNC: 110 MMOL/L (ref 100–111)
CHOLEST SERPL-MCNC: 148 MG/DL
CO2 SERPL-SCNC: 26 MMOL/L (ref 21–32)
CREAT SERPL-MCNC: 1.18 MG/DL (ref 0.6–1.3)
EST. AVERAGE GLUCOSE BLD GHB EST-MCNC: 105 MG/DL
GLOBULIN SER CALC-MCNC: 2.9 G/DL (ref 2–4)
GLUCOSE SERPL-MCNC: 107 MG/DL (ref 74–99)
HBA1C MFR BLD: 5.3 % (ref 4.2–5.6)
HDLC SERPL-MCNC: 55 MG/DL (ref 40–60)
HDLC SERPL: 2.7 (ref 0–5)
LDLC SERPL CALC-MCNC: 78.8 MG/DL (ref 0–100)
LIPID PANEL: NORMAL
POTASSIUM SERPL-SCNC: 4.1 MMOL/L (ref 3.5–5.5)
PROT SERPL-MCNC: 6.7 G/DL (ref 6.4–8.2)
SODIUM SERPL-SCNC: 142 MMOL/L (ref 136–145)
TRIGL SERPL-MCNC: 71 MG/DL
TSH SERPL DL<=0.05 MIU/L-ACNC: 2.83 UIU/ML (ref 0.36–3.74)
VLDLC SERPL CALC-MCNC: 14.2 MG/DL

## 2024-06-27 PROCEDURE — 36415 COLL VENOUS BLD VENIPUNCTURE: CPT

## 2024-06-27 PROCEDURE — 83036 HEMOGLOBIN GLYCOSYLATED A1C: CPT

## 2024-06-27 PROCEDURE — 84443 ASSAY THYROID STIM HORMONE: CPT

## 2024-06-27 PROCEDURE — 80053 COMPREHEN METABOLIC PANEL: CPT

## 2024-06-27 PROCEDURE — 80061 LIPID PANEL: CPT

## 2024-07-03 ENCOUNTER — OFFICE VISIT (OUTPATIENT)
Facility: CLINIC | Age: 69
End: 2024-07-03
Payer: MEDICARE

## 2024-07-03 VITALS
TEMPERATURE: 96.9 F | OXYGEN SATURATION: 96 % | WEIGHT: 187 LBS | SYSTOLIC BLOOD PRESSURE: 103 MMHG | HEIGHT: 70 IN | DIASTOLIC BLOOD PRESSURE: 71 MMHG | BODY MASS INDEX: 26.77 KG/M2 | HEART RATE: 98 BPM | RESPIRATION RATE: 16 BRPM

## 2024-07-03 DIAGNOSIS — R73.9 HYPERGLYCEMIA, UNSPECIFIED: ICD-10-CM

## 2024-07-03 DIAGNOSIS — R39.9 LOWER URINARY TRACT SYMPTOMS (LUTS): ICD-10-CM

## 2024-07-03 DIAGNOSIS — E78.5 DYSLIPIDEMIA: Primary | ICD-10-CM

## 2024-07-03 PROCEDURE — 99214 OFFICE O/P EST MOD 30 MIN: CPT | Performed by: FAMILY MEDICINE

## 2024-07-03 PROCEDURE — 1123F ACP DISCUSS/DSCN MKR DOCD: CPT | Performed by: FAMILY MEDICINE

## 2024-07-03 RX ORDER — AMOXICILLIN AND CLAVULANATE POTASSIUM 875; 125 MG/1; MG/1
TABLET, FILM COATED ORAL
COMMUNITY
Start: 2024-06-28

## 2024-07-03 SDOH — ECONOMIC STABILITY: HOUSING INSECURITY
IN THE LAST 12 MONTHS, WAS THERE A TIME WHEN YOU DID NOT HAVE A STEADY PLACE TO SLEEP OR SLEPT IN A SHELTER (INCLUDING NOW)?: PATIENT DECLINED

## 2024-07-03 SDOH — ECONOMIC STABILITY: FOOD INSECURITY: WITHIN THE PAST 12 MONTHS, THE FOOD YOU BOUGHT JUST DIDN'T LAST AND YOU DIDN'T HAVE MONEY TO GET MORE.: PATIENT DECLINED

## 2024-07-03 SDOH — ECONOMIC STABILITY: FOOD INSECURITY: WITHIN THE PAST 12 MONTHS, YOU WORRIED THAT YOUR FOOD WOULD RUN OUT BEFORE YOU GOT MONEY TO BUY MORE.: PATIENT DECLINED

## 2024-07-03 SDOH — ECONOMIC STABILITY: INCOME INSECURITY: HOW HARD IS IT FOR YOU TO PAY FOR THE VERY BASICS LIKE FOOD, HOUSING, MEDICAL CARE, AND HEATING?: PATIENT DECLINED

## 2024-07-03 NOTE — PROGRESS NOTES
HPI:  Frank Cardona is a 68 y.o. male who presents today with   Chief Complaint   Patient presents with    Cholesterol Problem     6 month follow-up         Pt has been well;  Has had a recent sinus infection; patient has been treated for sinus infection symptoms have improved.    Still has urinary frequency. Has some urinary hesitancy and sx of incomplete voiding.  Patient states that taking one half of a THC gummy helped his sx. He inquires about finding a medical marijuana provider.     Patient did go to dermatology.  The skin lesion in question was benign.  Patient has had an elevated blood sugar primarily in the morning.  His A1c's have been controlled.    Lab Results   Component Value Date    CHOL 148 06/27/2024    TRIG 71 06/27/2024    HDL 55 06/27/2024    LDL 78.8 06/27/2024    VLDL 14.2 06/27/2024    CHOLHDLRATIO 2.7 06/27/2024     Lab Results   Component Value Date     06/27/2024    K 4.1 06/27/2024     06/27/2024    CO2 26 06/27/2024    BUN 18 06/27/2024    CREATININE 1.18 06/27/2024    GLUCOSE 107 (H) 06/27/2024    CALCIUM 9.1 06/27/2024    BILITOT 0.2 06/27/2024    ALKPHOS 76 06/27/2024    AST 16 06/27/2024    ALT 29 06/27/2024    LABGLOM 67 06/27/2024    GFRAA >60 06/20/2022    AGRATIO 1.3 12/29/2022    GLOB 2.9 06/27/2024       Hemoglobin A1C   Date Value Ref Range Status   06/27/2024 5.3 4.2 - 5.6 % Final     Comment:     (NOTE)  HbA1C Interpretive Ranges  <5.7              Normal  5.7 - 6.4         Consider Prediabetes  >6.5              Consider Diabetes                No data to display                        PMH,  Meds, Allergies, Family History, Social history reviewed      Current Outpatient Medications   Medication Sig Dispense Refill    amoxicillin-clavulanate (AUGMENTIN) 875-125 MG per tablet       solifenacin (VESICARE) 5 MG tablet TAKE 1 TABLET DAILY 90 tablet 3    clopidogrel (PLAVIX) 75 MG tablet TAKE 1 TABLET DAILY 90 tablet 3    simvastatin (ZOCOR) 40 MG tablet TAKE

## 2024-07-03 NOTE — PROGRESS NOTES
1. \"Have you been to the ER, urgent care clinic since your last visit?  Hospitalized since your last visit?\" Yes, 06/28/2024- Yes, [facility] ([address])     2. \"Have you seen or consulted any other health care providers outside of the [facility] since your last visit?\"  Yes, [facility] ([address])       3. For patients aged 45-75: Has the patient had a colonoscopy / FIT/ Cologuard? Yes - [facility] [place] present. Most recent result on file

## 2025-01-06 ENCOUNTER — HOSPITAL ENCOUNTER (OUTPATIENT)
Facility: HOSPITAL | Age: 70
Setting detail: SPECIMEN
Discharge: HOME | End: 2025-01-09
Payer: MEDICARE

## 2025-01-06 DIAGNOSIS — R73.9 HYPERGLYCEMIA, UNSPECIFIED: ICD-10-CM

## 2025-01-06 DIAGNOSIS — E78.5 DYSLIPIDEMIA: ICD-10-CM

## 2025-01-06 LAB
ALBUMIN SERPL-MCNC: 3.6 G/DL (ref 3.4–5)
ALBUMIN/GLOB SERPL: 1.3 (ref 0.8–1.7)
ALP SERPL-CCNC: 70 U/L (ref 45–117)
ALT SERPL-CCNC: 39 U/L (ref 16–61)
ANION GAP SERPL CALC-SCNC: 2 MMOL/L (ref 3–18)
AST SERPL-CCNC: 21 U/L (ref 10–38)
BILIRUB SERPL-MCNC: 0.7 MG/DL (ref 0.2–1)
BUN SERPL-MCNC: 19 MG/DL (ref 7–18)
BUN/CREAT SERPL: 19 (ref 12–20)
CALCIUM SERPL-MCNC: 8.6 MG/DL (ref 8.5–10.1)
CHLORIDE SERPL-SCNC: 108 MMOL/L (ref 100–111)
CHOLEST SERPL-MCNC: 142 MG/DL
CO2 SERPL-SCNC: 30 MMOL/L (ref 21–32)
CREAT SERPL-MCNC: 1 MG/DL (ref 0.6–1.3)
EST. AVERAGE GLUCOSE BLD GHB EST-MCNC: 105 MG/DL
GLOBULIN SER CALC-MCNC: 2.8 G/DL (ref 2–4)
GLUCOSE SERPL-MCNC: 106 MG/DL (ref 74–99)
HBA1C MFR BLD: 5.3 % (ref 4.2–5.6)
HDLC SERPL-MCNC: 62 MG/DL (ref 40–60)
HDLC SERPL: 2.3 (ref 0–5)
LDLC SERPL CALC-MCNC: 66.8 MG/DL (ref 0–100)
LIPID PANEL: ABNORMAL
POTASSIUM SERPL-SCNC: 3.7 MMOL/L (ref 3.5–5.5)
PROT SERPL-MCNC: 6.4 G/DL (ref 6.4–8.2)
SODIUM SERPL-SCNC: 140 MMOL/L (ref 136–145)
TRIGL SERPL-MCNC: 66 MG/DL
TSH SERPL DL<=0.05 MIU/L-ACNC: 2.27 UIU/ML (ref 0.36–3.74)
VLDLC SERPL CALC-MCNC: 13.2 MG/DL

## 2025-01-06 PROCEDURE — 80053 COMPREHEN METABOLIC PANEL: CPT

## 2025-01-06 PROCEDURE — 84443 ASSAY THYROID STIM HORMONE: CPT

## 2025-01-06 PROCEDURE — 83036 HEMOGLOBIN GLYCOSYLATED A1C: CPT

## 2025-01-06 PROCEDURE — 36415 COLL VENOUS BLD VENIPUNCTURE: CPT

## 2025-01-06 PROCEDURE — 80061 LIPID PANEL: CPT

## 2025-01-12 SDOH — ECONOMIC STABILITY: FOOD INSECURITY: WITHIN THE PAST 12 MONTHS, YOU WORRIED THAT YOUR FOOD WOULD RUN OUT BEFORE YOU GOT MONEY TO BUY MORE.: NEVER TRUE

## 2025-01-12 SDOH — ECONOMIC STABILITY: TRANSPORTATION INSECURITY
IN THE PAST 12 MONTHS, HAS LACK OF TRANSPORTATION KEPT YOU FROM MEETINGS, WORK, OR FROM GETTING THINGS NEEDED FOR DAILY LIVING?: NO

## 2025-01-12 SDOH — ECONOMIC STABILITY: FOOD INSECURITY: WITHIN THE PAST 12 MONTHS, THE FOOD YOU BOUGHT JUST DIDN'T LAST AND YOU DIDN'T HAVE MONEY TO GET MORE.: NEVER TRUE

## 2025-01-12 SDOH — ECONOMIC STABILITY: INCOME INSECURITY: IN THE LAST 12 MONTHS, WAS THERE A TIME WHEN YOU WERE NOT ABLE TO PAY THE MORTGAGE OR RENT ON TIME?: NO

## 2025-01-12 SDOH — ECONOMIC STABILITY: TRANSPORTATION INSECURITY
IN THE PAST 12 MONTHS, HAS THE LACK OF TRANSPORTATION KEPT YOU FROM MEDICAL APPOINTMENTS OR FROM GETTING MEDICATIONS?: NO

## 2025-01-13 ENCOUNTER — OFFICE VISIT (OUTPATIENT)
Facility: CLINIC | Age: 70
End: 2025-01-13

## 2025-01-13 VITALS
WEIGHT: 191 LBS | OXYGEN SATURATION: 97 % | HEART RATE: 81 BPM | RESPIRATION RATE: 16 BRPM | HEIGHT: 70 IN | SYSTOLIC BLOOD PRESSURE: 129 MMHG | DIASTOLIC BLOOD PRESSURE: 77 MMHG | BODY MASS INDEX: 27.35 KG/M2 | TEMPERATURE: 97.1 F

## 2025-01-13 DIAGNOSIS — Z00.00 MEDICARE ANNUAL WELLNESS VISIT, SUBSEQUENT: Primary | ICD-10-CM

## 2025-01-13 DIAGNOSIS — Z87.891 PERSONAL HISTORY OF TOBACCO USE: ICD-10-CM

## 2025-01-13 DIAGNOSIS — R73.9 HYPERGLYCEMIA, UNSPECIFIED: ICD-10-CM

## 2025-01-13 DIAGNOSIS — E78.5 DYSLIPIDEMIA: ICD-10-CM

## 2025-01-13 RX ORDER — IBUPROFEN 200 MG
200 TABLET ORAL EVERY 6 HOURS PRN
COMMUNITY

## 2025-01-13 ASSESSMENT — ANXIETY QUESTIONNAIRES
3. WORRYING TOO MUCH ABOUT DIFFERENT THINGS: NOT AT ALL
7. FEELING AFRAID AS IF SOMETHING AWFUL MIGHT HAPPEN: NOT AT ALL
2. NOT BEING ABLE TO STOP OR CONTROL WORRYING: NOT AT ALL
1. FEELING NERVOUS, ANXIOUS, OR ON EDGE: NOT AT ALL
4. TROUBLE RELAXING: NOT AT ALL
5. BEING SO RESTLESS THAT IT IS HARD TO SIT STILL: NOT AT ALL
6. BECOMING EASILY ANNOYED OR IRRITABLE: NOT AT ALL
GAD7 TOTAL SCORE: 0
IF YOU CHECKED OFF ANY PROBLEMS ON THIS QUESTIONNAIRE, HOW DIFFICULT HAVE THESE PROBLEMS MADE IT FOR YOU TO DO YOUR WORK, TAKE CARE OF THINGS AT HOME, OR GET ALONG WITH OTHER PEOPLE: NOT DIFFICULT AT ALL

## 2025-01-13 ASSESSMENT — LIFESTYLE VARIABLES
HOW OFTEN DO YOU HAVE A DRINK CONTAINING ALCOHOL: NEVER
HOW MANY STANDARD DRINKS CONTAINING ALCOHOL DO YOU HAVE ON A TYPICAL DAY: PATIENT DOES NOT DRINK

## 2025-01-13 ASSESSMENT — PATIENT HEALTH QUESTIONNAIRE - PHQ9
SUM OF ALL RESPONSES TO PHQ QUESTIONS 1-9: 0
SUM OF ALL RESPONSES TO PHQ QUESTIONS 1-9: 0
SUM OF ALL RESPONSES TO PHQ9 QUESTIONS 1 & 2: 0
SUM OF ALL RESPONSES TO PHQ QUESTIONS 1-9: 0
SUM OF ALL RESPONSES TO PHQ QUESTIONS 1-9: 0
1. LITTLE INTEREST OR PLEASURE IN DOING THINGS: NOT AT ALL
2. FEELING DOWN, DEPRESSED OR HOPELESS: NOT AT ALL

## 2025-01-13 NOTE — PROGRESS NOTES
\"Have you been to the ER, urgent care clinic since your last visit?  Hospitalized since your last visit?\"    NO    “Have you seen or consulted any other health care providers outside of [facility]?”    This include any pap smears or colon screening. Yes, [facility] ([facility]) and Urology- [facility] Dr. THERON Oneal, Dental- Dr. Frank and Ophthalmology- [facility]     Click Here for Release of Records Request   
  clopidogrel (PLAVIX) 75 MG tablet TAKE 1 TABLET DAILY Yes Melisa Curran MD   simvastatin (ZOCOR) 40 MG tablet TAKE 1 TABLET NIGHTLY Yes Melisa Curran MD   triamcinolone (NASACORT) 55 MCG/ACT nasal inhaler 2 sprays by Nasal route daily as needed Yes Provider, MD Arun   famotidine (PEPCID) 20 MG tablet Take 1 tablet by mouth 2 times daily Yes Automatic Reconciliation, Ar   loratadine (CLARITIN) 10 MG tablet Take 1 tablet by mouth daily as needed Yes Automatic Reconciliation, Ar       CareTeam (Including outside providers/suppliers regularly involved in providing care):   Patient Care Team:  Melisa Curran MD as PCP - General  Melisa Curran MD as PCP - Empaneled Provider     Recommendations for Preventive Services Due: see orders and patient instructions/AVS.  Recommended screening schedule for the next 5-10 years is provided to the patient in written form: see Patient Instructions/AVS.     Reviewed and updated this visit:  Tobacco  Allergies  Meds  Problems  Med Hx  Surg Hx  Soc Hx  Fam Hx              The patient (or guardian, if applicable) and other individuals in attendance with the patient were advised that Artificial Intelligence will be utilized during this visit to record and process the conversation to generate a clinical note. The patient (or guardian, if applicable) and other individuals in attendance at the appointment consented to the use of AI, including the recording.        
Hemoglobin A1C; Future      As above    Patient stable  Labs as ordered  Refilled medications needed  AVS is accessible thru [facility] and pt has been advised of same.   This has been fully explained to the patient, who indicates understanding.      Additional discussion below:      Assessment & Plan  1. Medicare wellness examination.  His blood glucose levels have remained stable, with an A1c of 5.3. Renal function is satisfactory, as indicated by a GFR of 81. Liver function tests, calcium levels, and lipid profile are all within normal limits. Thyroid function, as assessed by TSH, is also normal. He has declined the lung cancer CT scan at this time. He has been informed about the availability of the pneumonia vaccine at local pharmacies if he chooses to get this later.    2. Congestion.  He has been advised to use Coricidin HBP for his congestion. Mucinex DM, which contains dextromethorphan, can also be used for cough suppression. He has been cautioned against the use of Mucinex D due to its pseudoephedrine content, which can elevate blood pressure. He has been instructed to monitor for any changes in the color of his nasal discharge, such as green or yellow, and to seek medical attention if he develops this and/or a fever.    Return in about 6 months (around 7/13/2025) for cholesterol.        Melisa Curran MD

## 2025-01-13 NOTE — PATIENT INSTRUCTIONS
learn more?  Go to https://www.[employer].net/patientEd and enter Q940 to learn more about \"Learning About Lung Cancer Screening.\"  Current as of: October 25, 2023  Content Version: 14.3  © 2024 [employer].   Care instructions adapted under license by [employer]. If you have questions about a medical condition or this instruction, always ask your healthcare professional. [employer], disclaims any warranty or liability for your use of this information.         A Healthy Heart: Care Instructions  Overview     Coronary artery disease, also called heart disease, occurs when a substance called plaque builds up in the vessels that supply oxygen-rich blood to your heart muscle. This can narrow the blood vessels and reduce blood flow. A heart attack happens when blood flow is completely blocked. A high-fat diet, smoking, and other factors increase the risk of heart disease.  Your doctor has found that you have a chance of having heart disease. A heart-healthy lifestyle can help keep your heart healthy and prevent heart disease. This lifestyle includes eating healthy, being active, staying at a weight that's healthy for you, and not smoking or using tobacco. It also includes taking medicines as directed, managing other health conditions, and trying to get a healthy amount of sleep.  Follow-up care is a key part of your treatment and safety. Be sure to make and go to all appointments, and call your doctor if you are having problems. It's also a good idea to know your test results and keep a list of the medicines you take.  How can you care for yourself at home?  Diet    Use less salt when you cook and eat. This helps lower your blood pressure. Taste food before salting. Add only a little salt when you think you need it. With time, your taste buds will adjust to less salt.     Eat fewer snack items, fast foods, canned soups, and other high-salt, high-fat, processed foods.     Read food labels and try

## 2025-07-07 ENCOUNTER — HOSPITAL ENCOUNTER (OUTPATIENT)
Facility: HOSPITAL | Age: 70
Setting detail: SPECIMEN
Discharge: HOME | End: 2025-07-10
Payer: MEDICARE

## 2025-07-07 DIAGNOSIS — R73.9 HYPERGLYCEMIA, UNSPECIFIED: ICD-10-CM

## 2025-07-07 DIAGNOSIS — E78.5 DYSLIPIDEMIA: ICD-10-CM

## 2025-07-07 LAB
ALBUMIN SERPL-MCNC: 4.1 G/DL (ref 3.4–5)
ALBUMIN/GLOB SERPL: 1.6 (ref 0.8–1.7)
ALP SERPL-CCNC: 70 U/L (ref 45–117)
ALT SERPL-CCNC: 23 U/L (ref 10–50)
ANION GAP SERPL CALC-SCNC: 13 MMOL/L (ref 3–18)
AST SERPL-CCNC: 22 U/L (ref 10–38)
BILIRUB SERPL-MCNC: 0.6 MG/DL (ref 0.2–1)
BUN SERPL-MCNC: 13 MG/DL (ref 6–23)
BUN/CREAT SERPL: 14 (ref 12–20)
CALCIUM SERPL-MCNC: 9.1 MG/DL (ref 8.5–10.1)
CHLORIDE SERPL-SCNC: 105 MMOL/L (ref 98–107)
CHOLEST SERPL-MCNC: 153 MG/DL
CO2 SERPL-SCNC: 26 MMOL/L (ref 21–32)
CREAT SERPL-MCNC: 0.97 MG/DL (ref 0.6–1.3)
EST. AVERAGE GLUCOSE BLD GHB EST-MCNC: 111 MG/DL
GLOBULIN SER CALC-MCNC: 2.6 G/DL (ref 2–4)
GLUCOSE SERPL-MCNC: 104 MG/DL (ref 74–108)
HBA1C MFR BLD: 5.5 % (ref 4.2–5.6)
HDLC SERPL-MCNC: 61 MG/DL (ref 40–60)
HDLC SERPL: 2.5 (ref 0–5)
LDLC SERPL CALC-MCNC: 78 MG/DL (ref 0–100)
POTASSIUM SERPL-SCNC: 4.6 MMOL/L (ref 3.5–5.5)
PROT SERPL-MCNC: 6.6 G/DL (ref 6.4–8.2)
SODIUM SERPL-SCNC: 143 MMOL/L (ref 136–145)
TRIGL SERPL-MCNC: 70 MG/DL (ref 0–150)
TSH, 3RD GENERATION: 3.34 UIU/ML (ref 0.27–4.2)
VLDLC SERPL CALC-MCNC: 14 MG/DL

## 2025-07-07 PROCEDURE — 36415 COLL VENOUS BLD VENIPUNCTURE: CPT

## 2025-07-07 PROCEDURE — 84443 ASSAY THYROID STIM HORMONE: CPT

## 2025-07-07 PROCEDURE — 80053 COMPREHEN METABOLIC PANEL: CPT

## 2025-07-07 PROCEDURE — 83036 HEMOGLOBIN GLYCOSYLATED A1C: CPT

## 2025-07-07 PROCEDURE — 80061 LIPID PANEL: CPT

## 2025-07-14 ENCOUNTER — OFFICE VISIT (OUTPATIENT)
Facility: CLINIC | Age: 70
End: 2025-07-14
Payer: MEDICARE

## 2025-07-14 VITALS
HEIGHT: 70 IN | TEMPERATURE: 98.3 F | OXYGEN SATURATION: 96 % | DIASTOLIC BLOOD PRESSURE: 83 MMHG | SYSTOLIC BLOOD PRESSURE: 125 MMHG | HEART RATE: 99 BPM | BODY MASS INDEX: 25.86 KG/M2 | RESPIRATION RATE: 16 BRPM | WEIGHT: 180.6 LBS

## 2025-07-14 DIAGNOSIS — E78.5 DYSLIPIDEMIA: Primary | ICD-10-CM

## 2025-07-14 DIAGNOSIS — R73.9 HYPERGLYCEMIA, UNSPECIFIED: ICD-10-CM

## 2025-07-14 PROCEDURE — 99214 OFFICE O/P EST MOD 30 MIN: CPT | Performed by: FAMILY MEDICINE

## 2025-07-14 PROCEDURE — 1123F ACP DISCUSS/DSCN MKR DOCD: CPT | Performed by: FAMILY MEDICINE

## 2025-07-14 RX ORDER — CLOPIDOGREL BISULFATE 75 MG/1
75 TABLET ORAL DAILY
Qty: 90 TABLET | Refills: 3 | Status: SHIPPED | OUTPATIENT
Start: 2025-07-14

## 2025-07-14 RX ORDER — SIMVASTATIN 40 MG
40 TABLET ORAL NIGHTLY
Qty: 90 TABLET | Refills: 3 | Status: SHIPPED | OUTPATIENT
Start: 2025-07-14

## 2025-07-14 SDOH — ECONOMIC STABILITY: FOOD INSECURITY: WITHIN THE PAST 12 MONTHS, THE FOOD YOU BOUGHT JUST DIDN'T LAST AND YOU DIDN'T HAVE MONEY TO GET MORE.: NEVER TRUE

## 2025-07-14 SDOH — ECONOMIC STABILITY: FOOD INSECURITY: WITHIN THE PAST 12 MONTHS, YOU WORRIED THAT YOUR FOOD WOULD RUN OUT BEFORE YOU GOT MONEY TO BUY MORE.: NEVER TRUE

## 2025-07-14 ASSESSMENT — PATIENT HEALTH QUESTIONNAIRE - PHQ9: DEPRESSION UNABLE TO ASSESS: PT REFUSES

## 2025-07-14 NOTE — PROGRESS NOTES
Have you been to the ER, urgent care clinic since your last visit?  Hospitalized since your last visit?   NO    Have you seen or consulted any other health care providers outside our system since your last visit?   NO    “Have you had a eye exam?”    YES - Where: 11/2024- Ophthalmology- [facility]  Nurse/CMA to request most recent records if not in the chart     No eye exam on file

## 2025-07-14 NOTE — PROGRESS NOTES
HPI:  Frank Cardona is a 69 y.o. male who presents today with   Chief Complaint   Patient presents with    Cholesterol Problem     6 month follow-up         History of Present Illness  Patient's BP is  stable;   Pt is on medications as listed below and  tolerates medications well  Patient also has hypercholesterolemia.  Pt is on simvastatin for this. Pt  tolerates all his meds and is compliant with the medication.  Last labs are as listed below.  These have been reviewed with the patient.    He needs refills on zocor an plavix;  he has had a TIA     He is under the care of urology for his BPH. He has follow up there soon. Has LUTs and is on meds for this.     He has no other complaints today.     Lab Results   Component Value Date    CHOL 153 07/07/2025    TRIG 70 07/07/2025    HDL 61 (H) 07/07/2025    LDL 78 07/07/2025    VLDL 14 07/07/2025    CHOLHDLRATIO 2.5 07/07/2025     Lab Results   Component Value Date     07/07/2025    K 4.6 07/07/2025     07/07/2025    CO2 26 07/07/2025    BUN 13 07/07/2025    CREATININE 0.97 07/07/2025    GLUCOSE 104 07/07/2025    CALCIUM 9.1 07/07/2025    BILITOT 0.6 07/07/2025    ALKPHOS 70 07/07/2025    AST 22 07/07/2025    ALT 23 07/07/2025    LABGLOM 84 07/07/2025    GFRAA >60 06/20/2022    AGRATIO 1.3 12/29/2022    GLOB 2.6 07/07/2025         Hemoglobin A1C   Date Value Ref Range Status   07/07/2025 5.5 4.2 - 5.6 % Final     Comment:     Reference Range  Normal       <5.7%  Prediabetes  5.7-6.4%  Diabetes     >6.4%           Wt Readings from Last 3 Encounters:   07/14/25 81.9 kg (180 lb 9.6 oz)   06/20/25 86.6 kg (191 lb)   06/10/25 83 kg (183 lb)              No data to display                  PMH,  Meds, Allergies, Family History, Social history reviewed      Current Outpatient Medications   Medication Sig Dispense Refill    NONFORMULARY daily culterall probiotics Vitamins      simvastatin (ZOCOR) 40 MG tablet Take 1 tablet by mouth nightly 90 tablet 3